# Patient Record
Sex: FEMALE | Race: WHITE | NOT HISPANIC OR LATINO | Employment: OTHER | URBAN - NONMETROPOLITAN AREA
[De-identification: names, ages, dates, MRNs, and addresses within clinical notes are randomized per-mention and may not be internally consistent; named-entity substitution may affect disease eponyms.]

---

## 2017-03-14 DIAGNOSIS — E11.9 TYPE 2 DIABETES MELLITUS WITHOUT COMPLICATION, WITHOUT LONG-TERM CURRENT USE OF INSULIN (HCC): Primary | ICD-10-CM

## 2017-03-15 LAB
ALBUMIN SERPL-MCNC: 4.2 G/DL (ref 3.2–5.5)
ALBUMIN/GLOB SERPL: 1.4 G/DL (ref 1–3)
ALP SERPL-CCNC: 53 U/L (ref 15–121)
ALT SERPL W P-5'-P-CCNC: 20 U/L (ref 10–60)
ANION GAP SERPL CALCULATED.3IONS-SCNC: 11 MMOL/L (ref 5–15)
AST SERPL-CCNC: 23 U/L (ref 10–60)
BASOPHILS # BLD AUTO: 0.02 10*3/MM3 (ref 0–0.2)
BASOPHILS NFR BLD AUTO: 0.3 % (ref 0–2)
BILIRUB SERPL-MCNC: 0.8 MG/DL (ref 0.2–1)
BUN BLD-MCNC: 20 MG/DL (ref 8–25)
BUN/CREAT SERPL: 16.7 (ref 7–25)
CALCIUM SPEC-SCNC: 9.5 MG/DL (ref 8.4–10.8)
CHLORIDE SERPL-SCNC: 104 MMOL/L (ref 100–112)
CHOLEST SERPL-MCNC: 136 MG/DL (ref 150–200)
CO2 SERPL-SCNC: 25 MMOL/L (ref 20–32)
CREAT BLD-MCNC: 1.2 MG/DL (ref 0.4–1.3)
DEPRECATED RDW RBC AUTO: 42.3 FL (ref 36.4–46.3)
EOSINOPHIL # BLD AUTO: 0.05 10*3/MM3 (ref 0–0.7)
EOSINOPHIL NFR BLD AUTO: 0.7 % (ref 0–7)
ERYTHROCYTE [DISTWIDTH] IN BLOOD BY AUTOMATED COUNT: 13.5 % (ref 11.5–14.5)
GFR SERPL CREATININE-BSD FRML MDRD: 45 ML/MIN/1.73 (ref 45–104)
GLOBULIN UR ELPH-MCNC: 3.1 GM/DL (ref 2.5–4.6)
GLUCOSE BLD-MCNC: 138 MG/DL (ref 70–100)
HCT VFR BLD AUTO: 32.6 % (ref 35–45)
HDLC SERPL-MCNC: 60 MG/DL (ref 35–100)
HGB BLD-MCNC: 10.6 G/DL (ref 12–15.5)
LDLC SERPL CALC-MCNC: 61 MG/DL
LDLC/HDLC SERPL: 1.02 {RATIO}
LYMPHOCYTES # BLD AUTO: 1.42 10*3/MM3 (ref 0.6–4.2)
LYMPHOCYTES NFR BLD AUTO: 18.7 % (ref 10–50)
MCH RBC QN AUTO: 29.1 PG (ref 26.5–34)
MCHC RBC AUTO-ENTMCNC: 32.5 G/DL (ref 31.4–36)
MCV RBC AUTO: 89.6 FL (ref 80–98)
MONOCYTES # BLD AUTO: 0.59 10*3/MM3 (ref 0–0.9)
MONOCYTES NFR BLD AUTO: 7.8 % (ref 0–12)
NEUTROPHILS # BLD AUTO: 5.52 10*3/MM3 (ref 2–8.6)
NEUTROPHILS NFR BLD AUTO: 72.5 % (ref 37–80)
PLATELET # BLD AUTO: 185 10*3/MM3 (ref 150–450)
PMV BLD AUTO: 9.8 FL (ref 8–12)
POTASSIUM BLD-SCNC: 4.5 MMOL/L (ref 3.4–5.4)
PROT SERPL-MCNC: 7.3 G/DL (ref 6.7–8.2)
RBC # BLD AUTO: 3.64 10*6/MM3 (ref 3.77–5.16)
SODIUM BLD-SCNC: 140 MMOL/L (ref 134–146)
TRIGL SERPL-MCNC: 74 MG/DL (ref 35–160)
VLDLC SERPL-MCNC: 14.8 MG/DL
WBC NRBC COR # BLD: 7.6 10*3/MM3 (ref 3.2–9.8)

## 2017-03-15 PROCEDURE — 84436 ASSAY OF TOTAL THYROXINE: CPT | Performed by: NURSE PRACTITIONER

## 2017-03-15 PROCEDURE — 80053 COMPREHEN METABOLIC PANEL: CPT | Performed by: NURSE PRACTITIONER

## 2017-03-15 PROCEDURE — 85025 COMPLETE CBC W/AUTO DIFF WBC: CPT | Performed by: NURSE PRACTITIONER

## 2017-03-15 PROCEDURE — 36415 COLL VENOUS BLD VENIPUNCTURE: CPT | Performed by: NURSE PRACTITIONER

## 2017-03-15 PROCEDURE — 80061 LIPID PANEL: CPT | Performed by: NURSE PRACTITIONER

## 2017-03-15 PROCEDURE — 84443 ASSAY THYROID STIM HORMONE: CPT | Performed by: NURSE PRACTITIONER

## 2017-03-15 PROCEDURE — 84481 FREE ASSAY (FT-3): CPT | Performed by: NURSE PRACTITIONER

## 2017-03-15 PROCEDURE — 83036 HEMOGLOBIN GLYCOSYLATED A1C: CPT | Performed by: NURSE PRACTITIONER

## 2017-03-16 ENCOUNTER — OFFICE VISIT (OUTPATIENT)
Dept: FAMILY MEDICINE CLINIC | Facility: CLINIC | Age: 62
End: 2017-03-16

## 2017-03-16 VITALS
SYSTOLIC BLOOD PRESSURE: 118 MMHG | BODY MASS INDEX: 24.46 KG/M2 | HEIGHT: 65 IN | TEMPERATURE: 98.9 F | HEART RATE: 88 BPM | DIASTOLIC BLOOD PRESSURE: 60 MMHG | WEIGHT: 146.8 LBS

## 2017-03-16 DIAGNOSIS — I10 ESSENTIAL HYPERTENSION: Primary | Chronic | ICD-10-CM

## 2017-03-16 DIAGNOSIS — E11.69 TYPE 2 DIABETES MELLITUS WITH OTHER SPECIFIED COMPLICATION (HCC): Chronic | ICD-10-CM

## 2017-03-16 DIAGNOSIS — E78.5 HYPERLIPIDEMIA, UNSPECIFIED HYPERLIPIDEMIA TYPE: Chronic | ICD-10-CM

## 2017-03-16 LAB
HBA1C MFR BLD: 7.68 % (ref 4–5.6)
T4 SERPL-MCNC: 8.64 MCG/DL (ref 5.5–11)
TSH SERPL DL<=0.05 MIU/L-ACNC: 2.28 MIU/ML (ref 0.46–4.68)

## 2017-03-16 PROCEDURE — 99213 OFFICE O/P EST LOW 20 MIN: CPT | Performed by: NURSE PRACTITIONER

## 2017-03-16 RX ORDER — LISINOPRIL 40 MG/1
20 TABLET ORAL DAILY
Qty: 15 TABLET | Refills: 5 | Status: SHIPPED | OUTPATIENT
Start: 2017-03-16 | End: 2017-09-06 | Stop reason: SDUPTHER

## 2017-03-17 LAB — T3FREE SERPL-MCNC: 2.5 PG/ML (ref 2–4.4)

## 2017-03-20 RX ORDER — GABAPENTIN 300 MG/1
CAPSULE ORAL
Qty: 60 CAPSULE | Refills: 5 | Status: SHIPPED | OUTPATIENT
Start: 2017-03-20 | End: 2017-09-06 | Stop reason: SDUPTHER

## 2017-03-22 PROBLEM — I10 ESSENTIAL HYPERTENSION: Status: ACTIVE | Noted: 2017-03-22

## 2017-03-22 PROBLEM — E11.9 TYPE 2 DIABETES MELLITUS (HCC): Status: ACTIVE | Noted: 2017-03-22

## 2017-03-22 PROBLEM — E78.5 HYPERLIPIDEMIA: Status: ACTIVE | Noted: 2017-03-22

## 2017-03-22 NOTE — PROGRESS NOTES
Subjective   Odessa Walker is a 62 y.o. female. Patient here today with complaints of Follow-up (3month)   patient here today for 3 month follow-up regarding hyperlipidemia, hypertension, diabetes mellitus.  She had labs recently and is here for those results, was started on Tanzeum at last visit, 2-3 months ago and states that this is working well for her.  She has lost a few pounds and her blood sugar is much better controlled.  She takes multivitamin daily.  Had colonoscopy and EGD in [id number] and reports that this was normal.  She is podiatry 3/20/17.  Sees an eye doctor yearly as well.  Mammogram and pneumonia vaccines were given to her last year.  She is due for and desires Zostavax.  No complaints of headache, shortness of breath, chest pain.  Has been checking her pulse and blood pressures and they have been running on average 60s to 70s, pulse, and systolic -145 on average 120s.  Diastolic BP 50s to 70s on average 60s.  Her blood sugars have been trending downward, lowest 107 and highest 247.  With the highest blood sugar she reports that she did eat a few snacks 2 hours prior to this reading.    Vitals:    03/16/17 1103   BP: 118/60   Pulse: 88   Temp: 98.9 °F (37.2 °C)     Past Medical History:   Diagnosis Date   • Benign essential hypertension    • Candidiasis of skin and nail     L foot   • Chest pain    • Chronic anemia    • Claudication    • Corns and callosities    • Depressive disorder    • Dermatitis    • Disease of nail     other specified   • Disorder of peripheral nervous system    • Displaced fracture of third metatarsal bone, right foot, initial encounter for closed fracture    • Displaced fracture of third metatarsal bone, right foot, subsequent encounter for fracture with routine healing    • Diverticular disease of colon    • Dog bite of hand    • Epigastric pain    • Essential hypertension    • Foot pain    • GERD (gastroesophageal reflux disease)    • H/O screening mammography     • Hallux valgus    • Heartburn    • Hyperlipidemia    • Joint pain    • Joint swelling    • Neurologic disorder associated with diabetes mellitus     type 2   • Nondisplaced fracture of fourth metatarsal bone, right foot, initial encounter for closed fracture    • Nondisplaced fracture of fourth metatarsal bone, right foot, subsequent encounter for fracture with routine healing    • Pain In Right Leg    • Superficial laceration of hand    • Type 2 diabetes mellitus    • Urinary tract infectious disease    • Venous insufficiency (chronic) (peripheral)      Hypertension   This is a chronic problem. The current episode started more than 1 year ago. The problem has been waxing and waning since onset. The problem is controlled. Pertinent negatives include no anxiety, blurred vision, chest pain, headaches, malaise/fatigue, neck pain, orthopnea, palpitations, peripheral edema, PND, shortness of breath or sweats. Agents associated with hypertension include NSAIDs. Risk factors for coronary artery disease include diabetes mellitus, dyslipidemia, post-menopausal state and sedentary lifestyle. Past treatments include ACE inhibitors. The current treatment provides moderate improvement. Compliance problems include exercise, diet, medication cost and psychosocial issues.  There is no history of angina, kidney disease, CAD/MI, CVA, heart failure, left ventricular hypertrophy, PVD, renovascular disease, retinopathy or a thyroid problem. There is no history of chronic renal disease, coarctation of the aorta, hyperaldosteronism, hypercortisolism, hyperparathyroidism, a hypertension causing med, pheochromocytoma or sleep apnea.   Hyperlipidemia   This is a chronic problem. The current episode started more than 1 year ago. The problem is controlled. Recent lipid tests were reviewed and are low. Exacerbating diseases include diabetes. She has no history of chronic renal disease, hypothyroidism, liver disease, obesity or nephrotic  syndrome. Factors aggravating her hyperlipidemia include fatty foods. Pertinent negatives include no chest pain, focal sensory loss, focal weakness, leg pain, myalgias or shortness of breath. Current antihyperlipidemic treatment includes exercise, diet change and statins. The current treatment provides moderate improvement of lipids. Compliance problems include adherence to exercise, adherence to diet, psychosocial issues and medication cost.  Risk factors for coronary artery disease include post-menopausal, hypertension, dyslipidemia and diabetes mellitus.   Diabetes   She presents for her follow-up diabetic visit. She has type 2 diabetes mellitus. Her disease course has been stable. There are no hypoglycemic associated symptoms. Pertinent negatives for hypoglycemia include no headaches or sweats. There are no diabetic associated symptoms. Pertinent negatives for diabetes include no blurred vision and no chest pain. There are no hypoglycemic complications. Symptoms are stable. There are no diabetic complications. Pertinent negatives for diabetic complications include no CVA, PVD or retinopathy. Risk factors for coronary artery disease include diabetes mellitus, dyslipidemia, hypertension and post-menopausal. Current diabetic treatment includes diet and oral agent (dual therapy) (Tanzeum). She is compliant with treatment all of the time. Her weight is stable. She is following a generally healthy diet. She participates in exercise intermittently. Her home blood glucose trend is decreasing steadily. Her breakfast blood glucose range is generally 110-130 mg/dl. An ACE inhibitor/angiotensin II receptor blocker is being taken. She sees a podiatrist.Eye exam is current.        The following portions of the patient's history were reviewed and updated as appropriate: allergies, current medications, past family history, past medical history, past social history, past surgical history and problem list.    Review of Systems    Constitutional: Negative.  Negative for malaise/fatigue.   HENT: Negative.    Eyes: Negative.  Negative for blurred vision.   Respiratory: Negative.  Negative for shortness of breath.    Cardiovascular: Negative.  Negative for chest pain, palpitations, orthopnea and PND.   Gastrointestinal: Negative.    Endocrine: Negative.    Genitourinary: Negative.    Musculoskeletal: Negative.  Negative for myalgias and neck pain.   Skin: Negative.    Allergic/Immunologic: Negative.    Neurological: Negative.  Negative for focal weakness and headaches.   Hematological: Negative.    Psychiatric/Behavioral: Negative.        Objective   Physical Exam   Constitutional: She is oriented to person, place, and time. She appears well-developed and well-nourished. No distress.   HENT:   Head: Normocephalic and atraumatic.   Neck: Neck supple. Normal carotid pulses present. Carotid bruit is not present.   Cardiovascular: Normal rate, regular rhythm, normal heart sounds and intact distal pulses.  Exam reveals no gallop and no friction rub.    No murmur heard.  Pulmonary/Chest: Effort normal and breath sounds normal. No respiratory distress. She has no wheezes. She has no rales.   Abdominal: Soft. Bowel sounds are normal. She exhibits no distension and no mass. There is no tenderness. There is no guarding. No hernia.   Musculoskeletal: Normal range of motion. She exhibits no edema.   Neurological: She is alert and oriented to person, place, and time.   Skin: Skin is warm and dry. No rash noted. She is not diaphoretic. No erythema. No pallor.   Psychiatric: She has a normal mood and affect. Her behavior is normal. Judgment and thought content normal.   Nursing note and vitals reviewed.      Assessment/Plan   Odessa was seen today for follow-up.    Diagnoses and all orders for this visit:    Essential hypertension    Hyperlipidemia, unspecified hyperlipidemia type    Type 2 diabetes mellitus with other specified complication    Other  orders  -     SITagliptin (JANUVIA) 100 MG tablet; Take 1 tablet by mouth Daily.  -     lisinopril (PRINIVIL,ZESTRIL) 40 MG tablet; Take 0.5 tablets by mouth Daily.  -     zoster vaccine live PF (ZOSTAVAX) 13707 UNT/0.65ML injection; Inject 19,400 Units under the skin 1 (One) Time for 1 dose.        She is given refills of lisinopril and Januvia and no other refills needed today.  Lab results are discussed with her and copies are given to her.  She will see podiatry on March 20 as scheduled and will continue to see then yearly as well as optometry /ophthalmology yearly.  We'll continue to monitor blood pressure and pulse as well as blood sugar.  Will return to the clinic in 3 months or sooner if necessary.  She is given Zostavax prescription for pharmacist to give her she can bring back here for us to give to her.  All questions and concerns are addressed with understanding noted. The patient is in agreement to above plan.

## 2017-03-27 RX ORDER — OMEPRAZOLE 40 MG/1
CAPSULE, DELAYED RELEASE ORAL
Qty: 30 CAPSULE | Refills: 6 | Status: SHIPPED | OUTPATIENT
Start: 2017-03-27 | End: 2017-09-06 | Stop reason: SDUPTHER

## 2017-04-03 RX ORDER — BLOOD SUGAR DIAGNOSTIC
STRIP MISCELLANEOUS
Qty: 50 EACH | Refills: 11 | Status: SHIPPED | OUTPATIENT
Start: 2017-04-03 | End: 2017-12-07 | Stop reason: SDUPTHER

## 2017-04-06 RX ORDER — LISINOPRIL 40 MG/1
TABLET ORAL
Qty: 15 TABLET | Refills: 5 | Status: SHIPPED | OUTPATIENT
Start: 2017-04-06 | End: 2017-06-09

## 2017-04-07 ENCOUNTER — OFFICE VISIT (OUTPATIENT)
Dept: PODIATRY | Facility: CLINIC | Age: 62
End: 2017-04-07

## 2017-04-07 VITALS — BODY MASS INDEX: 24.32 KG/M2 | WEIGHT: 146 LBS | HEIGHT: 65 IN

## 2017-04-07 DIAGNOSIS — B35.1 ONYCHOMYCOSIS: Primary | ICD-10-CM

## 2017-04-07 DIAGNOSIS — M79.672 FOOT PAIN, BILATERAL: ICD-10-CM

## 2017-04-07 DIAGNOSIS — M20.41 HAMMER TOES OF BOTH FEET: ICD-10-CM

## 2017-04-07 DIAGNOSIS — M79.671 FOOT PAIN, BILATERAL: ICD-10-CM

## 2017-04-07 DIAGNOSIS — M20.42 HAMMER TOES OF BOTH FEET: ICD-10-CM

## 2017-04-07 DIAGNOSIS — M20.10 HALLUX VALGUS, UNSPECIFIED LATERALITY: ICD-10-CM

## 2017-04-07 PROCEDURE — 99213 OFFICE O/P EST LOW 20 MIN: CPT | Performed by: PODIATRIST

## 2017-04-07 PROCEDURE — 11721 DEBRIDE NAIL 6 OR MORE: CPT | Performed by: PODIATRIST

## 2017-04-07 NOTE — PROGRESS NOTES
Odessa Walker  1955  62 y.o. female   PCP: LEXY MESSINA 03/16/2017  BS-200 per pt  A1c 7.68 (03/15/2017)    Patient presents today for routine diabetic nail care.    4/7/17    Chief Complaint   Patient presents with   • Left Foot - diabetic nail care, Bunions, Hammer Toe   • Right Foot - Bunions, Hammer Toe, daibetic nail care           History of Present Illness    Patient presents today for routine diabetic foot care.  States her nails the become long and painful especially walking.  She describes the pain as achy and constant when her nails are long.  Rates the pain as a 3 out of 10.  She also wishes to further discuss surgery to treat her foot deformities.  Admits to numbness, burning and tingling in both her feet.  She has no other pedal complaints         Past Medical History:   Diagnosis Date   • Benign essential hypertension    • Candidiasis of skin and nail     L foot   • Chest pain    • Chronic anemia    • Claudication    • Corns and callosities    • Depressive disorder    • Dermatitis    • Disease of nail     other specified   • Disorder of peripheral nervous system    • Displaced fracture of third metatarsal bone, right foot, initial encounter for closed fracture    • Displaced fracture of third metatarsal bone, right foot, subsequent encounter for fracture with routine healing    • Diverticular disease of colon    • Dog bite of hand    • Epigastric pain    • Essential hypertension    • Foot pain    • GERD (gastroesophageal reflux disease)    • H/O screening mammography    • Hallux valgus    • Heartburn    • Hyperlipidemia    • Joint pain    • Joint swelling    • Neurologic disorder associated with diabetes mellitus     type 2   • Nondisplaced fracture of fourth metatarsal bone, right foot, initial encounter for closed fracture    • Nondisplaced fracture of fourth metatarsal bone, right foot, subsequent encounter for fracture with routine healing    • Pain In Right Leg    • Superficial  laceration of hand    • Type 2 diabetes mellitus    • Urinary tract infectious disease    • Venous insufficiency (chronic) (peripheral)          Past Surgical History:   Procedure Laterality Date   • BACK SURGERY  2007   • COLONOSCOPY  11/11/2015    Diverticulosis found in the sigmoid colon. Hemorrhoids found in the anus.   • KNEE SURGERY  2005   • OTHER SURGICAL HISTORY  05/06/2015    DEBRIDE NAIL 6 OR MORE    • OTHER SURGICAL HISTORY  05/06/2015    PARING CORN/CALLUS    • OTHER SURGICAL HISTORY  10/25/2015    TREAT METATARSAL FRACTURE    • UPPER GASTROINTESTINAL ENDOSCOPY  11/11/2015    Esophagitis seen.Biopsy taken.A hiatus hernia was found in the esophagus.Gastritis found in the stomach.Biopsy taken   • US VENOUS EXTREMITY UNILATERAL  01/26/2016   • VAGINAL HYSTERECTOMY SALPINGO OOPHORECTOMY  1999         Family History   Problem Relation Age of Onset   • Heart disease Mother    • Osteoarthritis Father    • Diabetes Other          Social History     Social History   • Marital status:      Spouse name: N/A   • Number of children: N/A   • Years of education: N/A     Occupational History   • Not on file.     Social History Main Topics   • Smoking status: Former Smoker   • Smokeless tobacco: Never Used   • Alcohol use No   • Drug use: No   • Sexual activity: Not on file     Other Topics Concern   • Not on file     Social History Narrative         Current Outpatient Prescriptions   Medication Sig Dispense Refill   • gabapentin (NEURONTIN) 300 MG capsule TAKE 1 CAPSULE BY MOUTH DAILY AT BEDTIME 60 capsule 5   • glipiZIDE (GLUCOTROL) 10 MG tablet Take 1 tablet by mouth 2 (Two) Times a Day Before Meals. 60 tablet 5   • lisinopril (PRINIVIL,ZESTRIL) 40 MG tablet Take 0.5 tablets by mouth Daily. 15 tablet 5   • lisinopril (PRINIVIL,ZESTRIL) 40 MG tablet TAKE 1/2 TABLET BY MOUTH DAILY 15 tablet 5   • metFORMIN (GLUCOPHAGE) 1000 MG tablet Take 1 tablet by mouth 2 (Two) Times a Day With Meals. 60 tablet 5   •  "omeprazole (priLOSEC) 40 MG capsule TAKE 1 CAPSULE BY MOUTH EVERY DAY 30 capsule 6   • ONETOUCH VERIO test strip USE TO TEST BLOOD SUGAR EVERY DAY 50 each 11   • sertraline (ZOLOFT) 50 MG tablet TAKE 1 TABLET BY MOUTH EVERY DAY 60 tablet 5   • simvastatin (ZOCOR) 40 MG tablet TAKE 1 TABLET BY MOUTH DAILY AT BEDTIME 30 tablet 5   • SITagliptin (JANUVIA) 100 MG tablet Take 1 tablet by mouth Daily. 30 tablet 5   • sulindac (CLINORIL) 200 MG tablet TAKE 1 TABLET BY MOUTH TWICE DAILY 60 tablet 5   • TANZEUM 30 MG pen-injector INJECT 30MG SUB-Q ONCE A WEEK 4 each 2     No current facility-administered medications for this visit.          OBJECTIVE    Ht 65\" (165.1 cm)  Wt 146 lb (66.2 kg)  BMI 24.3 kg/m2      Review of Systems   Constitutional: Negative for chills and fever.   Cardiovascular: Negative for chest pain.   Gastrointestinal: Negative for constipation, diarrhea, nausea and vomiting.   Skin: Negative for wound. long painful toenails  Musculoskeletal: bilateral foot pain      Constitutional: well developed, well nourished    HEENT: Normocephalic and atraumatic, normal hearing    Respiratory: Non labored respirations noted    Cardiovascular:    DP/PT pulses faintly palpable    CFT brisk  to all digits  Skin temp is warm to warm from proximal tibia to distal digits  Pedal hair growth absent.   No erythema noted  Edema noted to b/l LEs    Musculoskeletal:  Muscle strength is 5/5 for all muscle groups tested   Moderate to severe hallux valgus noted right, mild to moderate hallux valgus left  Rigidly contracted second digit on the right, reducibly contracted digits 3,4 and 5 right  Reducibly contracted digits 2 through 5 left  Diminished fat pad bilateral  Pain with first MPJ range of motion bilateral    Dermatological:   Nails 1-5 are thickened, elongated and discolored with subungal debris bilateral    Skin is warm, dry and intact    Webspaces 1-4 bilateral are clean, dry and intact.   No subcutaneous nodules or " masses noted    No open wounds noted     Neurological:   Protective sensation diminished  Sensation intact to light touch    DTR intact    Psychiatric: A&O x 3 with normal mood and affect. NAD.         Procedures        ASSESSMENT AND PLAN    Odessa was seen today for diabetic nail care, bunions, hammer toe, bunions, hammer toe and daibetic nail care.    Diagnoses and all orders for this visit:    Onychomycosis    Hammer toes of both feet    Hallux valgus, unspecified laterality    Foot pain, bilateral    - Nails 1 through 5 bilateral were debrided in length and thickness without incident utilizing nail nippers.  - Rediscussed surgical options to treat her foot deformities.  At this time patient elected to hold off on surgery.  - All questions were answered and the patient is in agreement with the current treatment plan.  - RTC prn         This document has been electronically signed by Ge Elizondo DPM on April 9, 2017 11:58 AM     4/9/2017  11:58 AM

## 2017-05-16 ENCOUNTER — TELEPHONE (OUTPATIENT)
Dept: FAMILY MEDICINE CLINIC | Facility: CLINIC | Age: 62
End: 2017-05-16

## 2017-05-16 DIAGNOSIS — Z12.31 SCREENING MAMMOGRAM, ENCOUNTER FOR: Primary | ICD-10-CM

## 2017-05-16 DIAGNOSIS — Z13.820 SCREENING FOR OSTEOPOROSIS: ICD-10-CM

## 2017-05-31 ENCOUNTER — TELEPHONE (OUTPATIENT)
Dept: FAMILY MEDICINE CLINIC | Facility: CLINIC | Age: 62
End: 2017-05-31

## 2017-06-09 ENCOUNTER — OFFICE VISIT (OUTPATIENT)
Dept: FAMILY MEDICINE CLINIC | Facility: CLINIC | Age: 62
End: 2017-06-09

## 2017-06-09 VITALS
DIASTOLIC BLOOD PRESSURE: 64 MMHG | TEMPERATURE: 99.2 F | HEIGHT: 65 IN | WEIGHT: 144.4 LBS | HEART RATE: 88 BPM | SYSTOLIC BLOOD PRESSURE: 122 MMHG | BODY MASS INDEX: 24.06 KG/M2

## 2017-06-09 DIAGNOSIS — E11.69 TYPE 2 DIABETES MELLITUS WITH OTHER SPECIFIED COMPLICATION (HCC): Chronic | ICD-10-CM

## 2017-06-09 DIAGNOSIS — I10 ESSENTIAL HYPERTENSION: Primary | Chronic | ICD-10-CM

## 2017-06-09 DIAGNOSIS — E78.5 HYPERLIPIDEMIA, UNSPECIFIED HYPERLIPIDEMIA TYPE: Chronic | ICD-10-CM

## 2017-06-09 PROCEDURE — 99214 OFFICE O/P EST MOD 30 MIN: CPT | Performed by: NURSE PRACTITIONER

## 2017-06-09 RX ORDER — GLIPIZIDE 10 MG/1
10 TABLET ORAL
Qty: 60 TABLET | Refills: 5 | Status: SHIPPED | OUTPATIENT
Start: 2017-06-09 | End: 2017-11-13 | Stop reason: SDUPTHER

## 2017-06-09 RX ORDER — SIMVASTATIN 40 MG
40 TABLET ORAL NIGHTLY
Qty: 30 TABLET | Refills: 5 | Status: SHIPPED | OUTPATIENT
Start: 2017-06-09 | End: 2017-09-06 | Stop reason: SDUPTHER

## 2017-06-09 NOTE — PROGRESS NOTES
Subjective   Odessa Walker is a 62 y.o. female. Patient here today with complaints of Med Refill  pt here today for recheck of diabetes, hyperlipidemia, htn, controlled on lisinopril, metformin, glipizide, prilosec, zocor, tanzeum. Denies side effects of meds. mammo and BMD scheduled for 6-22-17, labs are UTD. Relates FSBS improved , running 130-140. Needing refills on metformin, zocor and glipizide today.     Vitals:    06/09/17 1303   BP: 122/64   Pulse: 88   Temp: 99.2 °F (37.3 °C)     Past Medical History:   Diagnosis Date   • Benign essential hypertension    • Candidiasis of skin and nail     L foot   • Chest pain    • Chronic anemia    • Claudication    • Corns and callosities    • Depressive disorder    • Dermatitis    • Disease of nail     other specified   • Disorder of peripheral nervous system    • Displaced fracture of third metatarsal bone, right foot, initial encounter for closed fracture    • Displaced fracture of third metatarsal bone, right foot, subsequent encounter for fracture with routine healing    • Diverticular disease of colon    • Dog bite of hand    • Epigastric pain    • Essential hypertension    • Foot pain    • GERD (gastroesophageal reflux disease)    • H/O screening mammography    • Hallux valgus    • Heartburn    • Hyperlipidemia    • Joint pain    • Joint swelling    • Neurologic disorder associated with diabetes mellitus     type 2   • Nondisplaced fracture of fourth metatarsal bone, right foot, initial encounter for closed fracture    • Nondisplaced fracture of fourth metatarsal bone, right foot, subsequent encounter for fracture with routine healing    • Pain In Right Leg    • Superficial laceration of hand    • Type 2 diabetes mellitus    • Urinary tract infectious disease    • Venous insufficiency (chronic) (peripheral)      Diabetes   She presents for her follow-up diabetic visit. She has type 2 diabetes mellitus. Her disease course has been improving. There are no  hypoglycemic associated symptoms. Pertinent negatives for hypoglycemia include no headaches or sweats. There are no diabetic associated symptoms. Pertinent negatives for diabetes include no blurred vision and no chest pain. There are no hypoglycemic complications. Symptoms are stable. There are no diabetic complications. Risk factors for coronary artery disease include dyslipidemia, diabetes mellitus, hypertension and post-menopausal. Current diabetic treatment includes oral agent (triple therapy) (tanzeum). She is compliant with treatment all of the time. Her weight is stable. She is following a generally healthy diet. Meal planning includes calorie counting. She participates in exercise intermittently. Her home blood glucose trend is decreasing steadily. Her breakfast blood glucose range is generally 130-140 mg/dl. An ACE inhibitor/angiotensin II receptor blocker is being taken. She sees a podiatrist.Eye exam is current.   Hypertension   This is a chronic problem. The current episode started more than 1 year ago. The problem is unchanged. The problem is controlled. Pertinent negatives include no anxiety, blurred vision, chest pain, headaches, malaise/fatigue, neck pain, orthopnea, palpitations, peripheral edema, PND, shortness of breath or sweats. Agents associated with hypertension include NSAIDs. Risk factors for coronary artery disease include dyslipidemia, diabetes mellitus and post-menopausal state. Past treatments include ACE inhibitors. The current treatment provides moderate improvement. Compliance problems include exercise, diet and psychosocial issues.  There is no history of chronic renal disease.   Hyperlipidemia   This is a chronic problem. The current episode started more than 1 year ago. The problem is controlled. Recent lipid tests were reviewed and are low. Exacerbating diseases include diabetes. She has no history of chronic renal disease, hypothyroidism, liver disease, obesity or nephrotic  syndrome. Factors aggravating her hyperlipidemia include fatty foods. Pertinent negatives include no chest pain, focal sensory loss, focal weakness, leg pain, myalgias or shortness of breath. Current antihyperlipidemic treatment includes exercise, diet change and statins. The current treatment provides moderate improvement of lipids. Compliance problems include adherence to exercise, adherence to diet and psychosocial issues.  Risk factors for coronary artery disease include dyslipidemia, diabetes mellitus, hypertension and post-menopausal.        The following portions of the patient's history were reviewed and updated as appropriate: allergies, current medications, past family history, past medical history, past social history, past surgical history and problem list.    Review of Systems   Constitutional: Negative.  Negative for malaise/fatigue.   HENT: Negative.    Eyes: Negative.  Negative for blurred vision.   Respiratory: Negative.  Negative for shortness of breath.    Cardiovascular: Negative.  Negative for chest pain, palpitations, orthopnea and PND.   Gastrointestinal: Negative.    Endocrine: Negative.    Genitourinary: Negative.    Musculoskeletal: Negative.  Negative for myalgias and neck pain.   Skin: Negative.    Allergic/Immunologic: Negative.    Neurological: Negative.  Negative for focal weakness and headaches.   Hematological: Negative.    Psychiatric/Behavioral: Negative.        Objective   Physical Exam   Constitutional: She is oriented to person, place, and time. She appears well-developed and well-nourished. No distress.   HENT:   Head: Normocephalic and atraumatic.   Neck: Neck supple. Normal carotid pulses present. Carotid bruit is not present.   Cardiovascular: Normal rate, regular rhythm, normal heart sounds and intact distal pulses.  Exam reveals no gallop and no friction rub.    No murmur heard.  Pulmonary/Chest: Effort normal and breath sounds normal. No respiratory distress. She has no  wheezes. She has no rales.   Musculoskeletal: Normal range of motion. She exhibits no edema.   Neurological: She is alert and oriented to person, place, and time.   Skin: Skin is warm and dry. No rash noted. She is not diaphoretic. No erythema. No pallor.   Psychiatric: She has a normal mood and affect. Her behavior is normal. Judgment and thought content normal.   Nursing note and vitals reviewed.      Assessment/Plan   Odessa was seen today for med refill.    Diagnoses and all orders for this visit:    Essential hypertension    Hyperlipidemia, unspecified hyperlipidemia type    Type 2 diabetes mellitus with other specified complication    Other orders  -     simvastatin (ZOCOR) 40 MG tablet; Take 1 tablet by mouth Every Night.  -     metFORMIN (GLUCOPHAGE) 1000 MG tablet; Take 1 tablet by mouth 2 (Two) Times a Day With Meals.  -     glipiZIDE (GLUCOTROL) 10 MG tablet; Take 1 tablet by mouth 2 (Two) Times a Day Before Meals.      She is given refills on metformin, glipizide and zocor as above, will have mammo and bmd as scheduled. Labs are UTD, will RTC in 3 months and will have labs repeated at that time as well. Cont to monitor her FSBS closely. All questions and concerns are addressed with understanding noted. The patient is in agreement to above plan.

## 2017-07-10 ENCOUNTER — OFFICE VISIT (OUTPATIENT)
Dept: PODIATRY | Facility: CLINIC | Age: 62
End: 2017-07-10

## 2017-07-10 VITALS — HEIGHT: 65 IN | BODY MASS INDEX: 24.32 KG/M2 | WEIGHT: 146 LBS

## 2017-07-10 DIAGNOSIS — M79.671 FOOT PAIN, BILATERAL: ICD-10-CM

## 2017-07-10 DIAGNOSIS — E11.42 TYPE 2 DIABETES MELLITUS WITH PERIPHERAL NEUROPATHY (HCC): ICD-10-CM

## 2017-07-10 DIAGNOSIS — B35.1 ONYCHOMYCOSIS: Primary | ICD-10-CM

## 2017-07-10 DIAGNOSIS — M79.672 FOOT PAIN, BILATERAL: ICD-10-CM

## 2017-07-10 PROCEDURE — 11721 DEBRIDE NAIL 6 OR MORE: CPT | Performed by: PODIATRIST

## 2017-07-10 NOTE — PROGRESS NOTES
Odessa Walker  1955  62 y.o. female   PCP: LEXY MESSINA 06/09/2017  BS-129 per pt    Patient states she had a bone scan and was told to follow-up with her podiatrist.    07/10/17    Chief Complaint   Patient presents with   • Left Foot - Follow-up   • Right Foot - Follow-up           History of Present Illness    Patient presents today for routine diabetic foot care.  States her nails the become long and painful especially walking.  She describes the pain as achy and constant when her nails are long.  Admits to numbness, burning and tingling in both her feet.  She has no other pedal complaints         Past Medical History:   Diagnosis Date   • Benign essential hypertension    • Candidiasis of skin and nail     L foot   • Chest pain    • Chronic anemia    • Claudication    • Corns and callosities    • Depressive disorder    • Dermatitis    • Disease of nail     other specified   • Disorder of peripheral nervous system    • Displaced fracture of third metatarsal bone, right foot, initial encounter for closed fracture    • Displaced fracture of third metatarsal bone, right foot, subsequent encounter for fracture with routine healing    • Diverticular disease of colon    • Dog bite of hand    • Epigastric pain    • Essential hypertension    • Foot pain    • GERD (gastroesophageal reflux disease)    • H/O screening mammography    • Hallux valgus    • Heartburn    • Hyperlipidemia    • Joint pain    • Joint swelling    • Neurologic disorder associated with diabetes mellitus     type 2   • Nondisplaced fracture of fourth metatarsal bone, right foot, initial encounter for closed fracture    • Nondisplaced fracture of fourth metatarsal bone, right foot, subsequent encounter for fracture with routine healing    • Pain In Right Leg    • Superficial laceration of hand    • Type 2 diabetes mellitus    • Urinary tract infectious disease    • Venous insufficiency (chronic) (peripheral)          Past Surgical History:    Procedure Laterality Date   • BACK SURGERY  2007   • COLONOSCOPY  11/11/2015    Diverticulosis found in the sigmoid colon. Hemorrhoids found in the anus.   • KNEE SURGERY  2005   • OTHER SURGICAL HISTORY  05/06/2015    DEBRIDE NAIL 6 OR MORE    • OTHER SURGICAL HISTORY  05/06/2015    PARING CORN/CALLUS    • OTHER SURGICAL HISTORY  10/25/2015    TREAT METATARSAL FRACTURE    • UPPER GASTROINTESTINAL ENDOSCOPY  11/11/2015    Esophagitis seen.Biopsy taken.A hiatus hernia was found in the esophagus.Gastritis found in the stomach.Biopsy taken   • US VENOUS EXTREMITY UNILATERAL  01/26/2016   • VAGINAL HYSTERECTOMY SALPINGO OOPHORECTOMY  1999         Family History   Problem Relation Age of Onset   • Heart disease Mother    • Osteoarthritis Father    • Diabetes Other          Social History     Social History   • Marital status:      Spouse name: N/A   • Number of children: N/A   • Years of education: N/A     Occupational History   • Not on file.     Social History Main Topics   • Smoking status: Former Smoker   • Smokeless tobacco: Never Used   • Alcohol use No   • Drug use: No   • Sexual activity: Not on file     Other Topics Concern   • Not on file     Social History Narrative         Current Outpatient Prescriptions   Medication Sig Dispense Refill   • gabapentin (NEURONTIN) 300 MG capsule TAKE 1 CAPSULE BY MOUTH DAILY AT BEDTIME 60 capsule 5   • glipiZIDE (GLUCOTROL) 10 MG tablet Take 1 tablet by mouth 2 (Two) Times a Day Before Meals. 60 tablet 5   • lisinopril (PRINIVIL,ZESTRIL) 40 MG tablet Take 0.5 tablets by mouth Daily. 15 tablet 5   • metFORMIN (GLUCOPHAGE) 1000 MG tablet Take 1 tablet by mouth 2 (Two) Times a Day With Meals. 60 tablet 5   • omeprazole (priLOSEC) 40 MG capsule TAKE 1 CAPSULE BY MOUTH EVERY DAY 30 capsule 6   • ONETOUCH VERIO test strip USE TO TEST BLOOD SUGAR EVERY DAY 50 each 11   • sertraline (ZOLOFT) 50 MG tablet TAKE 1 TABLET BY MOUTH EVERY DAY 60 tablet 5   • simvastatin (ZOCOR) 40  "MG tablet Take 1 tablet by mouth Every Night. 30 tablet 5   • SITagliptin (JANUVIA) 100 MG tablet Take 1 tablet by mouth Daily. 30 tablet 5   • sulindac (CLINORIL) 200 MG tablet TAKE 1 TABLET BY MOUTH TWICE DAILY 60 tablet 5   • TANZEUM 30 MG pen-injector INJECT 30MG SUB-Q ONCE A WEEK 4 each 2     No current facility-administered medications for this visit.          OBJECTIVE    Ht 65\" (165.1 cm)  Wt 146 lb (66.2 kg)  BMI 24.3 kg/m2      Review of Systems   Constitutional: Negative for chills and fever.   Cardiovascular: Negative for chest pain.   Gastrointestinal: Negative for constipation, diarrhea, nausea and vomiting.   Skin: Negative for wound. long painful toenails  Musculoskeletal: bilateral foot pain      Constitutional: well developed, well nourished    HEENT: Normocephalic and atraumatic, normal hearing    Respiratory: Non labored respirations noted    Cardiovascular:    DP/PT pulses faintly palpable    CFT brisk  to all digits  Skin temp is warm to warm from proximal tibia to distal digits  Pedal hair growth absent.   No erythema noted  Edema noted to b/l LEs    Musculoskeletal:  Muscle strength is 5/5 for all muscle groups tested   Moderate to severe hallux valgus noted right, mild to moderate hallux valgus left  Rigidly contracted second digit on the right, reducibly contracted digits 3,4 and 5 right  Reducibly contracted digits 2 through 5 left  Diminished fat pad bilateral  Pain with first MPJ range of motion bilateral  POP to toenails    Dermatological:   Nails 1-5 are thickened, elongated and discolored with subungal debris bilateral    Skin is warm, dry and intact    Webspaces 1-4 bilateral are clean, dry and intact.   No subcutaneous nodules or masses noted    No open wounds noted     Neurological:   Protective sensation diminished  Sensation intact to light touch    DTR intact    Psychiatric: A&O x 3 with normal mood and affect. NAD.         Procedures        ASSESSMENT AND PLAN    Odessa was " seen today for follow-up and follow-up.    Diagnoses and all orders for this visit:    Onychomycosis    Foot pain, bilateral    Type 2 diabetes mellitus with peripheral neuropathy    - Nails 1 through 5 bilateral were debrided in length and thickness without incident utilizing nail nippers.  - All questions were answered and the patient is in agreement with the current treatment plan.  - RTC  3 months         This document has been electronically signed by Ge Elizondo DPM on July 10, 2017 4:30 PM     7/10/2017  4:30 PM     EMR Dragon/Transcription disclaimer:   Much of this encounter note is an electronic transcription/translation of spoken language to printed text. The electronic translation of spoken language may permit erroneous, or at times, nonsensical words or phrases to be inadvertently transcribed; Although I have reviewed the note for such errors, some may still exist.

## 2017-07-11 ENCOUNTER — TELEPHONE (OUTPATIENT)
Dept: FAMILY MEDICINE CLINIC | Facility: CLINIC | Age: 62
End: 2017-07-11

## 2017-08-14 RX ORDER — SIMVASTATIN 40 MG
TABLET ORAL
Qty: 30 TABLET | Refills: 5 | Status: SHIPPED | OUTPATIENT
Start: 2017-08-14 | End: 2017-12-07 | Stop reason: SDUPTHER

## 2017-09-06 ENCOUNTER — OFFICE VISIT (OUTPATIENT)
Dept: FAMILY MEDICINE CLINIC | Facility: CLINIC | Age: 62
End: 2017-09-06

## 2017-09-06 VITALS
HEART RATE: 89 BPM | HEIGHT: 65 IN | BODY MASS INDEX: 23.49 KG/M2 | DIASTOLIC BLOOD PRESSURE: 66 MMHG | SYSTOLIC BLOOD PRESSURE: 102 MMHG | WEIGHT: 141 LBS | OXYGEN SATURATION: 98 %

## 2017-09-06 DIAGNOSIS — F32.A DEPRESSION, UNSPECIFIED DEPRESSION TYPE: Chronic | ICD-10-CM

## 2017-09-06 DIAGNOSIS — G64 DISORDER OF PERIPHERAL NERVOUS SYSTEM: Chronic | ICD-10-CM

## 2017-09-06 DIAGNOSIS — I10 ESSENTIAL HYPERTENSION: Primary | Chronic | ICD-10-CM

## 2017-09-06 DIAGNOSIS — E78.5 HYPERLIPIDEMIA, UNSPECIFIED HYPERLIPIDEMIA TYPE: Chronic | ICD-10-CM

## 2017-09-06 DIAGNOSIS — E11.69 TYPE 2 DIABETES MELLITUS WITH OTHER SPECIFIED COMPLICATION (HCC): Chronic | ICD-10-CM

## 2017-09-06 DIAGNOSIS — Z11.59 NEED FOR HEPATITIS C SCREENING TEST: ICD-10-CM

## 2017-09-06 DIAGNOSIS — F41.9 ANXIETY: Chronic | ICD-10-CM

## 2017-09-06 DIAGNOSIS — K21.9 GASTROESOPHAGEAL REFLUX DISEASE WITHOUT ESOPHAGITIS: Chronic | ICD-10-CM

## 2017-09-06 PROCEDURE — 99214 OFFICE O/P EST MOD 30 MIN: CPT | Performed by: NURSE PRACTITIONER

## 2017-09-06 RX ORDER — OMEPRAZOLE 40 MG/1
40 CAPSULE, DELAYED RELEASE ORAL DAILY
Qty: 30 CAPSULE | Refills: 5 | Status: SHIPPED | OUTPATIENT
Start: 2017-09-06 | End: 2018-05-22 | Stop reason: SDUPTHER

## 2017-09-06 RX ORDER — GABAPENTIN 300 MG/1
300 CAPSULE ORAL
Qty: 30 CAPSULE | Refills: 0 | Status: SHIPPED | OUTPATIENT
Start: 2017-09-06 | End: 2017-10-17 | Stop reason: SDUPTHER

## 2017-09-06 RX ORDER — LISINOPRIL 40 MG/1
20 TABLET ORAL DAILY
Qty: 15 TABLET | Refills: 5 | Status: SHIPPED | OUTPATIENT
Start: 2017-09-06 | End: 2017-11-27 | Stop reason: SDUPTHER

## 2017-09-06 NOTE — PROGRESS NOTES
Subjective   Odessa Walker is a 62 y.o. female. Patient here today with complaints of Hyperlipidemia (3 month f/u); Hypertension (3 month f/u); and Diabetes (3 month f.u)  Patient here today for 3 month follow-up regarding diabetes mellitus, hypertension, hyperlipidemia.  She is due for labs.  She is needing refills on some of her medications but not all of them.  She denies side effects of medicines stating that her symptoms are controlled on current medications.  She reports that blood sugars running 120-140 fasting.  She is down 5 more pounds from last visit here and says that she is trying to monitor her diet and exercise more and she is taking Tanzeum which she states has persisted and weight loss as well.  She denies complaints of chest pain or shortness of breath or headache.  Reports adequate sleep and appetite.      Vitals:    09/06/17 0914   BP: 102/66   Pulse: 89   SpO2: 98%     Past Medical History:   Diagnosis Date   • Benign essential hypertension    • Candidiasis of skin and nail     L foot   • Chest pain    • Chronic anemia    • Claudication    • Corns and callosities    • Depressive disorder    • Dermatitis    • Disease of nail     other specified   • Disorder of peripheral nervous system    • Displaced fracture of third metatarsal bone, right foot, initial encounter for closed fracture    • Displaced fracture of third metatarsal bone, right foot, subsequent encounter for fracture with routine healing    • Diverticular disease of colon    • Dog bite of hand    • Epigastric pain    • Essential hypertension    • Foot pain    • GERD (gastroesophageal reflux disease)    • H/O screening mammography    • Hallux valgus    • Heartburn    • Hyperlipidemia    • Joint pain    • Joint swelling    • Neurologic disorder associated with diabetes mellitus     type 2   • Nondisplaced fracture of fourth metatarsal bone, right foot, initial encounter for closed fracture    • Nondisplaced fracture of fourth  metatarsal bone, right foot, subsequent encounter for fracture with routine healing    • Pain In Right Leg    • Superficial laceration of hand    • Type 2 diabetes mellitus    • Urinary tract infectious disease    • Venous insufficiency (chronic) (peripheral)      Hyperlipidemia   This is a chronic problem. The current episode started more than 1 year ago. The problem is controlled. Recent lipid tests were reviewed and are low. Exacerbating diseases include diabetes. She has no history of chronic renal disease, hypothyroidism, liver disease, obesity or nephrotic syndrome. Factors aggravating her hyperlipidemia include fatty foods. Pertinent negatives include no chest pain, focal sensory loss, focal weakness, leg pain, myalgias or shortness of breath. Current antihyperlipidemic treatment includes statins, exercise and diet change. The current treatment provides significant improvement of lipids. There are no compliance problems.  Risk factors for coronary artery disease include post-menopausal, hypertension, dyslipidemia and diabetes mellitus.   Hypertension   This is a chronic problem. The current episode started more than 1 year ago. The problem is unchanged. The problem is controlled. Pertinent negatives include no blurred vision, chest pain, headaches, malaise/fatigue, neck pain, orthopnea, palpitations, peripheral edema, PND, shortness of breath or sweats. Agents associated with hypertension include NSAIDs. Risk factors for coronary artery disease include post-menopausal state, dyslipidemia and diabetes mellitus. Past treatments include lifestyle changes and ACE inhibitors. The current treatment provides significant improvement. There are no compliance problems.  There is no history of chronic renal disease.   Diabetes   She presents for her follow-up diabetic visit. She has type 2 diabetes mellitus. Her disease course has been stable. There are no hypoglycemic associated symptoms. Pertinent negatives for  hypoglycemia include no headaches or sweats. There are no diabetic associated symptoms. Pertinent negatives for diabetes include no blurred vision, no chest pain, no visual change and no weakness. There are no hypoglycemic complications. Symptoms are stable. There are no diabetic complications. Risk factors for coronary artery disease include diabetes mellitus, dyslipidemia, hypertension and post-menopausal. Current diabetic treatment includes oral agent (dual therapy) (plus tanzeum). She is compliant with treatment all of the time. Her weight is decreasing steadily. She is following a generally healthy diet. Meal planning includes carbohydrate counting and calorie counting. She participates in exercise intermittently. Her home blood glucose trend is decreasing steadily. Her breakfast blood glucose range is generally 110-130 mg/dl. An ACE inhibitor/angiotensin II receptor blocker is being taken. She sees a podiatrist.Eye exam is current.   Lower Extremity Issue   This is a chronic problem. The current episode started more than 1 year ago. The problem occurs constantly. The problem has been waxing and waning. Associated symptoms include numbness. Pertinent negatives include no chest pain, chills, congestion, coughing, headaches, myalgias, nausea, neck pain, swollen glands, urinary symptoms, vertigo, visual change, vomiting or weakness. Nothing aggravates the symptoms. Treatments tried: gabapentin. The treatment provided moderate relief.        The following portions of the patient's history were reviewed and updated as appropriate: allergies, current medications, past family history, past medical history, past social history, past surgical history and problem list.    Review of Systems   Constitutional: Negative.  Negative for chills and malaise/fatigue.   HENT: Negative.  Negative for congestion.    Eyes: Negative.  Negative for blurred vision.   Respiratory: Negative.  Negative for cough and shortness of breath.     Cardiovascular: Negative.  Negative for chest pain, palpitations, orthopnea and PND.   Gastrointestinal: Negative.  Negative for nausea and vomiting.   Endocrine: Negative.    Genitourinary: Negative.    Musculoskeletal: Negative.  Negative for myalgias and neck pain.   Skin: Negative.    Allergic/Immunologic: Negative.    Neurological: Positive for numbness. Negative for vertigo, focal weakness, weakness and headaches.   Hematological: Negative.    Psychiatric/Behavioral: Negative.        Objective   Physical Exam   Constitutional: She is oriented to person, place, and time. She appears well-developed and well-nourished. No distress.   HENT:   Head: Normocephalic and atraumatic.   Neck: Normal carotid pulses present. Carotid bruit is not present.   Cardiovascular: Normal rate, regular rhythm, normal heart sounds and intact distal pulses.  Exam reveals no gallop and no friction rub.    No murmur heard.  Pulmonary/Chest: Effort normal and breath sounds normal. No respiratory distress. She has no wheezes. She has no rales.   Abdominal: Soft. Bowel sounds are normal. She exhibits no distension and no mass. There is no tenderness. There is no rebound and no guarding. No hernia.   Musculoskeletal: Normal range of motion. She exhibits no edema.   Neurological: She is alert and oriented to person, place, and time.   Skin: Skin is warm and dry. No rash noted. She is not diaphoretic. No erythema. No pallor.   Psychiatric: She has a normal mood and affect. Her behavior is normal. Judgment and thought content normal.   Nursing note and vitals reviewed.      Assessment/Plan   Odessa was seen today for hyperlipidemia, hypertension and diabetes.    Diagnoses and all orders for this visit:    Essential hypertension  -     Hemoglobin A1c; Future  -     Lipid Panel; Future  -     Comprehensive Metabolic Panel; Future  -     CBC & Differential; Future  -     Pain Management Profile (13 Drugs) Urine; Future    Hyperlipidemia,  unspecified hyperlipidemia type  -     Hemoglobin A1c; Future  -     Lipid Panel; Future  -     Comprehensive Metabolic Panel; Future  -     CBC & Differential; Future  -     Pain Management Profile (13 Drugs) Urine; Future    Type 2 diabetes mellitus with other specified complication  -     Hemoglobin A1c; Future  -     Lipid Panel; Future  -     Comprehensive Metabolic Panel; Future  -     CBC & Differential; Future  -     Pain Management Profile (13 Drugs) Urine; Future    Disorder of peripheral nervous system  -     Hemoglobin A1c; Future  -     Lipid Panel; Future  -     Comprehensive Metabolic Panel; Future  -     CBC & Differential; Future  -     Pain Management Profile (13 Drugs) Urine; Future    Gastroesophageal reflux disease without esophagitis  -     Hemoglobin A1c; Future  -     Lipid Panel; Future  -     Comprehensive Metabolic Panel; Future  -     CBC & Differential; Future  -     Pain Management Profile (13 Drugs) Urine; Future    Anxiety  -     Hemoglobin A1c; Future  -     Lipid Panel; Future  -     Comprehensive Metabolic Panel; Future  -     CBC & Differential; Future  -     Pain Management Profile (13 Drugs) Urine; Future    Depression, unspecified depression type  -     Hemoglobin A1c; Future  -     Lipid Panel; Future  -     Comprehensive Metabolic Panel; Future  -     CBC & Differential; Future  -     Pain Management Profile (13 Drugs) Urine; Future    Need for hepatitis C screening test  -     Hepatitis C antibody; Future    Other orders  -     sertraline (ZOLOFT) 50 MG tablet; Take 1 tablet by mouth Daily.  -     omeprazole (priLOSEC) 40 MG capsule; Take 1 capsule by mouth Daily.  -     lisinopril (PRINIVIL,ZESTRIL) 40 MG tablet; Take 0.5 tablets by mouth Daily.  -     gabapentin (NEURONTIN) 300 MG capsule; Take 1 capsule by mouth every night at bedtime.       Ryan is obtained and reviewed.  I will refill lisinopril, omeprazole, Zoloft and gabapentin for her as above.  I will also go  ahead and order her labs which will be obtained when fasting at her convenience.  She will continue to monitor her blood sugar and she will continue to follow a diet and exercise program.  She will return to clinic in 3-6 months for recheck or sooner as needed.  JEANNINE query complete. Treatment plan to include limited course of prescribed controlled substance. Risks including addiction, benefits, and alternatives presented to patient.  All questions and concerns are addressed with understanding noted. She is aware and in agreement to above plan.

## 2017-09-08 ENCOUNTER — LAB (OUTPATIENT)
Dept: LAB | Facility: OTHER | Age: 62
End: 2017-09-08

## 2017-09-08 DIAGNOSIS — G64 DISORDER OF PERIPHERAL NERVOUS SYSTEM: ICD-10-CM

## 2017-09-08 DIAGNOSIS — Z11.59 NEED FOR HEPATITIS C SCREENING TEST: ICD-10-CM

## 2017-09-08 DIAGNOSIS — I10 ESSENTIAL HYPERTENSION: ICD-10-CM

## 2017-09-08 DIAGNOSIS — E11.69 TYPE 2 DIABETES MELLITUS WITH OTHER SPECIFIED COMPLICATION (HCC): ICD-10-CM

## 2017-09-08 DIAGNOSIS — F41.9 ANXIETY: ICD-10-CM

## 2017-09-08 DIAGNOSIS — F32.A DEPRESSION, UNSPECIFIED DEPRESSION TYPE: ICD-10-CM

## 2017-09-08 DIAGNOSIS — E78.5 HYPERLIPIDEMIA, UNSPECIFIED HYPERLIPIDEMIA TYPE: ICD-10-CM

## 2017-09-08 DIAGNOSIS — K21.9 GASTROESOPHAGEAL REFLUX DISEASE WITHOUT ESOPHAGITIS: ICD-10-CM

## 2017-09-08 LAB
ALBUMIN SERPL-MCNC: 4.5 G/DL (ref 3.2–5.5)
ALBUMIN/GLOB SERPL: 1.5 G/DL (ref 1–3)
ALP SERPL-CCNC: 45 U/L (ref 15–121)
ALT SERPL W P-5'-P-CCNC: 25 U/L (ref 10–60)
ANION GAP SERPL CALCULATED.3IONS-SCNC: 11 MMOL/L (ref 5–15)
AST SERPL-CCNC: 27 U/L (ref 10–60)
BASOPHILS # BLD AUTO: 0.02 10*3/MM3 (ref 0–0.2)
BASOPHILS NFR BLD AUTO: 0.3 % (ref 0–2)
BILIRUB SERPL-MCNC: 0.7 MG/DL (ref 0.2–1)
BUN BLD-MCNC: 28 MG/DL (ref 8–25)
BUN/CREAT SERPL: 23.3 (ref 7–25)
CALCIUM SPEC-SCNC: 9.5 MG/DL (ref 8.4–10.8)
CHLORIDE SERPL-SCNC: 105 MMOL/L (ref 100–112)
CHOLEST SERPL-MCNC: 157 MG/DL (ref 150–200)
CO2 SERPL-SCNC: 24 MMOL/L (ref 20–32)
CREAT BLD-MCNC: 1.2 MG/DL (ref 0.4–1.3)
DEPRECATED RDW RBC AUTO: 44 FL (ref 36.4–46.3)
EOSINOPHIL # BLD AUTO: 0.07 10*3/MM3 (ref 0–0.7)
EOSINOPHIL NFR BLD AUTO: 1 % (ref 0–7)
ERYTHROCYTE [DISTWIDTH] IN BLOOD BY AUTOMATED COUNT: 13.6 % (ref 11.5–14.5)
GFR SERPL CREATININE-BSD FRML MDRD: 45 ML/MIN/1.73 (ref 45–104)
GLOBULIN UR ELPH-MCNC: 3.1 GM/DL (ref 2.5–4.6)
GLUCOSE BLD-MCNC: 179 MG/DL (ref 70–100)
HBA1C MFR BLD: 7.3 % (ref 4–5.6)
HCT VFR BLD AUTO: 35.3 % (ref 35–45)
HCV AB SER DONR QL: NEGATIVE
HDLC SERPL-MCNC: 64 MG/DL (ref 35–100)
HGB BLD-MCNC: 11.5 G/DL (ref 12–15.5)
LDLC SERPL CALC-MCNC: 78 MG/DL
LDLC/HDLC SERPL: 1.22 {RATIO}
LYMPHOCYTES # BLD AUTO: 1.77 10*3/MM3 (ref 0.6–4.2)
LYMPHOCYTES NFR BLD AUTO: 25.8 % (ref 10–50)
MCH RBC QN AUTO: 29.6 PG (ref 26.5–34)
MCHC RBC AUTO-ENTMCNC: 32.6 G/DL (ref 31.4–36)
MCV RBC AUTO: 91 FL (ref 80–98)
MONOCYTES # BLD AUTO: 0.47 10*3/MM3 (ref 0–0.9)
MONOCYTES NFR BLD AUTO: 6.9 % (ref 0–12)
NEUTROPHILS # BLD AUTO: 4.53 10*3/MM3 (ref 2–8.6)
NEUTROPHILS NFR BLD AUTO: 66 % (ref 37–80)
PLATELET # BLD AUTO: 223 10*3/MM3 (ref 150–450)
PMV BLD AUTO: 9.7 FL (ref 8–12)
POTASSIUM BLD-SCNC: 5 MMOL/L (ref 3.4–5.4)
PROT SERPL-MCNC: 7.6 G/DL (ref 6.7–8.2)
RBC # BLD AUTO: 3.88 10*6/MM3 (ref 3.77–5.16)
SODIUM BLD-SCNC: 140 MMOL/L (ref 134–146)
TRIGL SERPL-MCNC: 75 MG/DL (ref 35–160)
VLDLC SERPL-MCNC: 15 MG/DL
WBC NRBC COR # BLD: 6.86 10*3/MM3 (ref 3.2–9.8)

## 2017-09-08 PROCEDURE — 80307 DRUG TEST PRSMV CHEM ANLYZR: CPT | Performed by: NURSE PRACTITIONER

## 2017-09-08 PROCEDURE — 80053 COMPREHEN METABOLIC PANEL: CPT | Performed by: NURSE PRACTITIONER

## 2017-09-08 PROCEDURE — 36415 COLL VENOUS BLD VENIPUNCTURE: CPT | Performed by: NURSE PRACTITIONER

## 2017-09-08 PROCEDURE — 85025 COMPLETE CBC W/AUTO DIFF WBC: CPT | Performed by: NURSE PRACTITIONER

## 2017-09-08 PROCEDURE — 83036 HEMOGLOBIN GLYCOSYLATED A1C: CPT | Performed by: NURSE PRACTITIONER

## 2017-09-08 PROCEDURE — 86803 HEPATITIS C AB TEST: CPT | Performed by: NURSE PRACTITIONER

## 2017-09-08 PROCEDURE — 80061 LIPID PANEL: CPT | Performed by: NURSE PRACTITIONER

## 2017-09-11 LAB
AMPHETAMINES UR QL SCN: NEGATIVE NG/ML
BARBITURATES UR QL SCN: NEGATIVE NG/ML
BENZODIAZ UR QL SCN: NEGATIVE NG/ML
BZE UR QL SCN: NEGATIVE NG/ML
CANNABINOIDS UR QL SCN: NEGATIVE NG/ML
CREAT 24H UR-MCNC: 97.4 MG/DL (ref 20–300)
FENTANYL+NORFENTANYL UR QL SCN: NEGATIVE PG/ML
Lab: NORMAL
MEPERIDINE UR CFM-MCNC: NEGATIVE NG/ML
METHADONE UR QL SCN: NEGATIVE NG/ML
OPIATES TESTED UR SCN: NEGATIVE NG/ML
OXYCODONE/OXYMORPHONE, URINE: NEGATIVE NG/ML
PCP UR QL: NEGATIVE NG/ML
PH UR STRIP.AUTO: 5.5 [PH] (ref 4.5–8.9)
PROPOXYPH UR QL SCN: NEGATIVE NG/ML
SP GR UR: 1.01
TRAMADOL UR QL SCN: NEGATIVE NG/ML

## 2017-10-17 RX ORDER — GABAPENTIN 300 MG/1
300 CAPSULE ORAL
Qty: 30 CAPSULE | Refills: 0 | Status: SHIPPED | OUTPATIENT
Start: 2017-10-17 | End: 2017-11-22 | Stop reason: SDUPTHER

## 2017-11-14 ENCOUNTER — OFFICE VISIT (OUTPATIENT)
Dept: FAMILY MEDICINE CLINIC | Facility: CLINIC | Age: 62
End: 2017-11-14

## 2017-11-14 VITALS
SYSTOLIC BLOOD PRESSURE: 100 MMHG | BODY MASS INDEX: 23.82 KG/M2 | OXYGEN SATURATION: 98 % | HEIGHT: 65 IN | DIASTOLIC BLOOD PRESSURE: 64 MMHG | WEIGHT: 143 LBS | TEMPERATURE: 98.7 F | HEART RATE: 85 BPM

## 2017-11-14 DIAGNOSIS — G64 DISORDER OF PERIPHERAL NERVOUS SYSTEM: ICD-10-CM

## 2017-11-14 DIAGNOSIS — S99.921A INJURY OF TOENAIL OF RIGHT FOOT, INITIAL ENCOUNTER: Primary | ICD-10-CM

## 2017-11-14 DIAGNOSIS — E11.69 TYPE 2 DIABETES MELLITUS WITH OTHER SPECIFIED COMPLICATION, WITHOUT LONG-TERM CURRENT USE OF INSULIN (HCC): ICD-10-CM

## 2017-11-14 DIAGNOSIS — I10 ESSENTIAL HYPERTENSION: ICD-10-CM

## 2017-11-14 PROCEDURE — 99213 OFFICE O/P EST LOW 20 MIN: CPT | Performed by: NURSE PRACTITIONER

## 2017-11-14 RX ORDER — GLIPIZIDE 10 MG/1
TABLET ORAL
Qty: 60 TABLET | Refills: 5 | Status: SHIPPED | OUTPATIENT
Start: 2017-11-14 | End: 2017-12-07 | Stop reason: SDUPTHER

## 2017-11-15 DIAGNOSIS — S99.921A INJURY OF GREAT TOE, RIGHT, INITIAL ENCOUNTER: Primary | ICD-10-CM

## 2017-11-15 NOTE — PROGRESS NOTES
Subjective   Odessa Walker is a 62 y.o. female. Patient here today with complaints of Toe Injury (big toe, right foot is bruised x 1 week)  Patient here today with complaints of right great toenail being dark and she is unsure if she injured this.  Thinks her dog may have stepped on her toe.  She does have peripheral neuropathy and diabetes, relates blood sugar running 120-140, improved on new diabetic medication.  She reports her toe and now are becoming increasingly painful.    Vitals:    11/14/17 1345   BP: 100/64   Pulse: 85   Temp: 98.7 °F (37.1 °C)   SpO2: 98%     Past Medical History:   Diagnosis Date   • Benign essential hypertension    • Candidiasis of skin and nail     L foot   • Chest pain    • Chronic anemia    • Claudication    • Corns and callosities    • Depressive disorder    • Dermatitis    • Disease of nail     other specified   • Disorder of peripheral nervous system    • Displaced fracture of third metatarsal bone, right foot, initial encounter for closed fracture    • Displaced fracture of third metatarsal bone, right foot, subsequent encounter for fracture with routine healing    • Diverticular disease of colon    • Dog bite of hand    • Epigastric pain    • Essential hypertension    • Foot pain    • GERD (gastroesophageal reflux disease)    • H/O screening mammography    • Hallux valgus    • Heartburn    • Hyperlipidemia    • Joint pain    • Joint swelling    • Neurologic disorder associated with diabetes mellitus     type 2   • Nondisplaced fracture of fourth metatarsal bone, right foot, initial encounter for closed fracture    • Nondisplaced fracture of fourth metatarsal bone, right foot, subsequent encounter for fracture with routine healing    • Pain in right leg    • Superficial laceration of hand    • Type 2 diabetes mellitus    • Urinary tract infectious disease    • Venous insufficiency (chronic) (peripheral)      Toe Injury   This is a new problem. The current episode started in  the past 7 days. The problem occurs constantly. The problem has been gradually worsening. Nothing aggravates the symptoms. She has tried nothing for the symptoms. The treatment provided no relief.        The following portions of the patient's history were reviewed and updated as appropriate: allergies, current medications, past family history, past medical history, past social history, past surgical history and problem list.    Review of Systems   Constitutional: Negative.    HENT: Negative.    Eyes: Negative.    Respiratory: Negative.    Cardiovascular: Negative.    Gastrointestinal: Negative.    Endocrine: Negative.    Genitourinary: Negative.    Musculoskeletal: Negative.    Skin: Positive for wound.   Allergic/Immunologic: Negative.    Neurological: Negative.    Hematological: Negative.    Psychiatric/Behavioral: Negative.        Objective   Physical Exam   Constitutional: She is oriented to person, place, and time. She appears well-developed and well-nourished. No distress.   Cardiovascular: Normal rate, regular rhythm, normal heart sounds and intact distal pulses.  Exam reveals no gallop and no friction rub.    No murmur heard.  Pulmonary/Chest: Effort normal and breath sounds normal. No respiratory distress. She has no wheezes. She has no rales.   Musculoskeletal: She exhibits no edema.   Neurological: She is alert and oriented to person, place, and time.   Skin: Skin is warm and dry. No rash noted. She is not diaphoretic. There is erythema. No pallor.   Great toenail, right foot is dark and tender to patient with skin surrounding this mildly erythematous, nonedematous, not warm to touch.  DP and PT pulses are palpable   Psychiatric: She has a normal mood and affect. Her behavior is normal.   Nursing note and vitals reviewed.      Assessment/Plan   Odessa was seen today for toe injury.    Diagnoses and all orders for this visit:    Injury of toenail of right foot, initial encounter    Essential  hypertension    Type 2 diabetes mellitus with other specified complication, without long-term current use of insulin    Disorder of peripheral nervous system      She is referred back to podiatry, Dr. Elizondo for further evaluation and treatment as he deems necessary.  Blood sugars are improving and she will continue to monitor.  She will follow-up as scheduled for chronic conditions or here if conditions worsen prior to seeing podiatry.  She is aware and is in agreement to this plan.  All questions and concerns are addressed with understanding noted.

## 2017-11-21 ENCOUNTER — TELEPHONE (OUTPATIENT)
Dept: FAMILY MEDICINE CLINIC | Facility: CLINIC | Age: 62
End: 2017-11-21

## 2017-11-22 RX ORDER — GABAPENTIN 300 MG/1
300 CAPSULE ORAL
Qty: 30 CAPSULE | Refills: 0 | Status: SHIPPED | OUTPATIENT
Start: 2017-11-22 | End: 2017-12-07

## 2017-11-27 ENCOUNTER — TELEPHONE (OUTPATIENT)
Dept: FAMILY MEDICINE CLINIC | Facility: CLINIC | Age: 62
End: 2017-11-27

## 2017-11-27 ENCOUNTER — OFFICE VISIT (OUTPATIENT)
Dept: PODIATRY | Facility: CLINIC | Age: 62
End: 2017-11-27

## 2017-11-27 VITALS — BODY MASS INDEX: 23.82 KG/M2 | HEIGHT: 65 IN | WEIGHT: 143 LBS

## 2017-11-27 DIAGNOSIS — M79.672 FOOT PAIN, BILATERAL: ICD-10-CM

## 2017-11-27 DIAGNOSIS — M79.671 FOOT PAIN, BILATERAL: ICD-10-CM

## 2017-11-27 DIAGNOSIS — E11.42 TYPE 2 DIABETES MELLITUS WITH PERIPHERAL NEUROPATHY (HCC): ICD-10-CM

## 2017-11-27 DIAGNOSIS — B35.1 ONYCHOMYCOSIS: Primary | ICD-10-CM

## 2017-11-27 PROCEDURE — 11721 DEBRIDE NAIL 6 OR MORE: CPT | Performed by: PODIATRIST

## 2017-11-27 RX ORDER — LISINOPRIL 40 MG/1
20 TABLET ORAL DAILY
Qty: 15 TABLET | Refills: 5 | Status: SHIPPED | OUTPATIENT
Start: 2017-11-27 | End: 2018-04-19 | Stop reason: SDUPTHER

## 2017-11-27 NOTE — PROGRESS NOTES
Odessa Walker  1955  62 y.o. female   PCP: LEXY MESSINA 06/09/2017  [id number] this morning per patient.     Patient presents today for diabetic foot care and problem with the right hallux toenail.     11/27/17    Chief Complaint   Patient presents with   • Right Foot - Nail Problem, Diabetic Foot Care   • Left Foot - Diabetic Foot Care           History of Present Illness    Patient presents today for routine diabetic foot care.  States her nails the become long and painful especially walking.  She describes the pain as achy and constant when her nails are long.  Admits to numbness, burning and tingling in both her feet.  She states that her right great toenail is discolored. She does not remember injuring it. It is sore but is getting better.         Past Medical History:   Diagnosis Date   • Benign essential hypertension    • Candidiasis of skin and nail     L foot   • Chest pain    • Chronic anemia    • Claudication    • Corns and callosities    • Depressive disorder    • Dermatitis    • Disease of nail     other specified   • Disorder of peripheral nervous system    • Displaced fracture of third metatarsal bone, right foot, initial encounter for closed fracture    • Displaced fracture of third metatarsal bone, right foot, subsequent encounter for fracture with routine healing    • Diverticular disease of colon    • Dog bite of hand    • Epigastric pain    • Essential hypertension    • Foot pain    • GERD (gastroesophageal reflux disease)    • H/O screening mammography    • Hallux valgus    • Heartburn    • Hyperlipidemia    • Joint pain    • Joint swelling    • Neurologic disorder associated with diabetes mellitus     type 2   • Nondisplaced fracture of fourth metatarsal bone, right foot, initial encounter for closed fracture    • Nondisplaced fracture of fourth metatarsal bone, right foot, subsequent encounter for fracture with routine healing    • Pain in right leg    • Superficial laceration of hand     • Type 2 diabetes mellitus    • Urinary tract infectious disease    • Venous insufficiency (chronic) (peripheral)          Past Surgical History:   Procedure Laterality Date   • BACK SURGERY  2007   • COLONOSCOPY  11/11/2015    Diverticulosis found in the sigmoid colon. Hemorrhoids found in the anus.   • KNEE SURGERY  2005   • OTHER SURGICAL HISTORY  05/06/2015    DEBRIDE NAIL 6 OR MORE    • OTHER SURGICAL HISTORY  05/06/2015    PARING CORN/CALLUS    • OTHER SURGICAL HISTORY  10/25/2015    TREAT METATARSAL FRACTURE    • UPPER GASTROINTESTINAL ENDOSCOPY  11/11/2015    Esophagitis seen.Biopsy taken.A hiatus hernia was found in the esophagus.Gastritis found in the stomach.Biopsy taken   • US VENOUS EXTREMITY UNILATERAL  01/26/2016   • VAGINAL HYSTERECTOMY SALPINGO OOPHORECTOMY  1999         Family History   Problem Relation Age of Onset   • Heart disease Mother    • Osteoarthritis Father    • Diabetes Other          Social History     Social History   • Marital status:      Spouse name: N/A   • Number of children: N/A   • Years of education: N/A     Occupational History   • Not on file.     Social History Main Topics   • Smoking status: Former Smoker   • Smokeless tobacco: Never Used   • Alcohol use No   • Drug use: No   • Sexual activity: Defer     Other Topics Concern   • Not on file     Social History Narrative         Current Outpatient Prescriptions   Medication Sig Dispense Refill   • Cholecalciferol (VITAMIN D PO) Take  by mouth.     • gabapentin (NEURONTIN) 300 MG capsule Take 1 capsule by mouth every night at bedtime. 30 capsule 0   • glipiZIDE (GLUCOTROL) 10 MG tablet TAKE 1 TABLET BY MOUTH TWICE DAILY BEFORE MEALS 60 tablet 5   • lisinopril (PRINIVIL,ZESTRIL) 40 MG tablet Take 0.5 tablets by mouth Daily. 15 tablet 5   • metFORMIN (GLUCOPHAGE) 1000 MG tablet Take 1 tablet by mouth 2 (Two) Times a Day With Meals. 60 tablet 5   • omeprazole (priLOSEC) 40 MG capsule Take 1 capsule by mouth Daily. 30  "capsule 5   • ONETOUCH VERIO test strip USE TO TEST BLOOD SUGAR EVERY DAY 50 each 11   • sertraline (ZOLOFT) 50 MG tablet Take 1 tablet by mouth Daily. 60 tablet 5   • simvastatin (ZOCOR) 40 MG tablet TAKE 1 TABLET BY MOUTH DAILY AT BEDTIME 30 tablet 5   • sulindac (CLINORIL) 200 MG tablet TAKE 1 TABLET BY MOUTH TWICE DAILY 60 tablet 5   • TANZEUM 30 MG pen-injector INJECT 30MG SUB-Q ONCE A WEEK 1 each 2     No current facility-administered medications for this visit.          OBJECTIVE    Ht 65\" (165.1 cm)  Wt 143 lb (64.9 kg)  BMI 23.8 kg/m2      Review of Systems   Constitutional: Negative for chills and fever.   Cardiovascular: Negative for chest pain.   Gastrointestinal: Negative for constipation, diarrhea, nausea and vomiting.   Skin: Negative for wound. long painful toenails  Musculoskeletal: bilateral foot pain      Constitutional: well developed, well nourished    HEENT: Normocephalic and atraumatic, normal hearing    Respiratory: Non labored respirations noted    Cardiovascular:    DP/PT pulses faintly palpable    CFT brisk  to all digits  Skin temp is warm to warm from proximal tibia to distal digits  Pedal hair growth absent.   No erythema noted  Edema noted to b/l LEs    Musculoskeletal:  Muscle strength is 5/5 for all muscle groups tested   Moderate to severe hallux valgus noted right, mild to moderate hallux valgus left  Rigidly contracted second digit on the right, reducibly contracted digits 3,4 and 5 right  Reducibly contracted digits 2 through 5 left  Diminished fat pad bilateral  Pain with first MPJ range of motion bilateral  POP to toenails    Dermatological:   Nails 1-5 are thickened, elongated and discolored with subungal debris bilateral, Right hallux nail with subungual hematoma.  It is slightly tender to palpation.   Skin is warm, dry and intact    Webspaces 1-4 bilateral are clean, dry and intact.   No subcutaneous nodules or masses noted    No open wounds noted     Neurological: "   Protective sensation diminished  Sensation intact to light touch    DTR intact    Psychiatric: A&O x 3 with normal mood and affect. NAD.         Procedures        ASSESSMENT AND PLAN    Odessa was seen today for nail problem, diabetic foot care and diabetic foot care.    Diagnoses and all orders for this visit:    Onychomycosis    Foot pain, bilateral    Type 2 diabetes mellitus with peripheral neuropathy    - Nails 1 through 5 bilateral were debrided in length and thickness without incident utilizing nail nippers.  - will  monitor right hallux.   - All questions were answered    - RTC  3 months or sooner if right hallux does not continue to improve         This document has been electronically signed by Ge Elizondo DPM on November 27, 2017 4:42 PM     11/27/2017  4:42 PM

## 2017-12-07 ENCOUNTER — OFFICE VISIT (OUTPATIENT)
Dept: FAMILY MEDICINE CLINIC | Facility: CLINIC | Age: 62
End: 2017-12-07

## 2017-12-07 VITALS
HEART RATE: 83 BPM | SYSTOLIC BLOOD PRESSURE: 122 MMHG | HEIGHT: 65 IN | BODY MASS INDEX: 23.66 KG/M2 | WEIGHT: 142 LBS | DIASTOLIC BLOOD PRESSURE: 62 MMHG | OXYGEN SATURATION: 98 %

## 2017-12-07 DIAGNOSIS — I10 ESSENTIAL HYPERTENSION: Primary | Chronic | ICD-10-CM

## 2017-12-07 DIAGNOSIS — E78.5 HYPERLIPIDEMIA, UNSPECIFIED HYPERLIPIDEMIA TYPE: Chronic | ICD-10-CM

## 2017-12-07 DIAGNOSIS — E11.69 TYPE 2 DIABETES MELLITUS WITH OTHER SPECIFIED COMPLICATION, WITHOUT LONG-TERM CURRENT USE OF INSULIN (HCC): Chronic | ICD-10-CM

## 2017-12-07 PROCEDURE — 99213 OFFICE O/P EST LOW 20 MIN: CPT | Performed by: NURSE PRACTITIONER

## 2017-12-07 RX ORDER — SIMVASTATIN 40 MG
40 TABLET ORAL NIGHTLY
Qty: 30 TABLET | Refills: 5 | Status: SHIPPED | OUTPATIENT
Start: 2017-12-07 | End: 2018-09-04 | Stop reason: SDUPTHER

## 2017-12-07 RX ORDER — OMEPRAZOLE 40 MG/1
40 CAPSULE, DELAYED RELEASE ORAL DAILY
Qty: 30 CAPSULE | Refills: 5 | Status: CANCELLED | OUTPATIENT
Start: 2017-12-07

## 2017-12-07 RX ORDER — GLIPIZIDE 10 MG/1
10 TABLET ORAL
Qty: 60 TABLET | Refills: 5 | Status: SHIPPED | OUTPATIENT
Start: 2017-12-07 | End: 2018-06-25 | Stop reason: SDUPTHER

## 2017-12-07 RX ORDER — LISINOPRIL 40 MG/1
20 TABLET ORAL DAILY
Qty: 15 TABLET | Refills: 5 | Status: CANCELLED | OUTPATIENT
Start: 2017-12-07

## 2017-12-07 RX ORDER — SULINDAC 200 MG/1
200 TABLET ORAL 2 TIMES DAILY
Qty: 60 TABLET | Refills: 5 | Status: SHIPPED | OUTPATIENT
Start: 2017-12-07 | End: 2018-10-31 | Stop reason: SDUPTHER

## 2017-12-07 NOTE — PROGRESS NOTES
"Subjective   Odessa Walker is a 62 y.o. female. Patient here today with complaints of Follow-up    62 year old female presenting to the clinic today for follow-up diabetes.  She reports that she is overall feeling well.  She reports that she has been having some fatigue the past year and that she isn't able to \"walk her dog as far\" as she was able to previously.  Her blood sugars from the past 5 days are around 120-180 and she says that she was unable to refill her metformin for a few days. She has stopped taking gabapentin due to sedation and states she feels better not taking it. She is needing some refills and is due for some labs as well.      Vitals:    12/07/17 1057   BP: 122/62   Pulse: 83   SpO2: 98%     Past Medical History:   Diagnosis Date   • Benign essential hypertension    • Candidiasis of skin and nail     L foot   • Chest pain    • Chronic anemia    • Claudication    • Corns and callosities    • Depressive disorder    • Dermatitis    • Disease of nail     other specified   • Disorder of peripheral nervous system    • Displaced fracture of third metatarsal bone, right foot, initial encounter for closed fracture    • Displaced fracture of third metatarsal bone, right foot, subsequent encounter for fracture with routine healing    • Diverticular disease of colon    • Dog bite of hand    • Epigastric pain    • Essential hypertension    • Foot pain    • GERD (gastroesophageal reflux disease)    • H/O screening mammography    • Hallux valgus    • Heartburn    • Hyperlipidemia    • Joint pain    • Joint swelling    • Neurologic disorder associated with diabetes mellitus     type 2   • Nondisplaced fracture of fourth metatarsal bone, right foot, initial encounter for closed fracture    • Nondisplaced fracture of fourth metatarsal bone, right foot, subsequent encounter for fracture with routine healing    • Pain in right leg    • Superficial laceration of hand    • Type 2 diabetes mellitus    • Urinary " tract infectious disease    • Venous insufficiency (chronic) (peripheral)      History of Present Illness     The following portions of the patient's history were reviewed and updated as appropriate: allergies, current medications, past family history, past medical history, past social history, past surgical history and problem list.    Review of Systems   Constitutional: Positive for fatigue.   HENT: Negative.    Eyes: Negative.    Respiratory: Negative.    Cardiovascular: Negative.    Gastrointestinal: Negative.    Endocrine: Negative.    Genitourinary: Negative.    Musculoskeletal: Negative.    Skin: Negative.    Allergic/Immunologic: Negative.    Neurological: Negative.    Hematological: Negative.    Psychiatric/Behavioral: Negative.        Objective   Physical Exam   Constitutional: She is oriented to person, place, and time. She appears well-developed and well-nourished. No distress.   HENT:   Head: Normocephalic and atraumatic.   Eyes: EOM are normal. Pupils are equal, round, and reactive to light.   Neck: Normal range of motion. Neck supple. Normal carotid pulses present. Carotid bruit is not present.   Cardiovascular: Normal rate, regular rhythm, normal heart sounds and intact distal pulses.  Exam reveals no gallop and no friction rub.    No murmur heard.  Pulmonary/Chest: Effort normal and breath sounds normal. No respiratory distress. She has no wheezes. She has no rales.   Abdominal: Soft. Bowel sounds are normal.   Musculoskeletal: Normal range of motion. She exhibits no edema.   Neurological: She is alert and oriented to person, place, and time.   Skin: Skin is warm and dry. No rash noted. She is not diaphoretic. No erythema. No pallor.   Psychiatric: She has a normal mood and affect. Her behavior is normal.   Nursing note and vitals reviewed.      Assessment/Plan   Odessa was seen today for follow-up.    Diagnoses and all orders for this visit:    Essential hypertension  -     Comprehensive Metabolic  Panel; Future  -     CBC & Differential; Future  -     Iron and TIBC; Future  -     Ferritin; Future  -     Vitamin B12; Future  -     Folate; Future    Hyperlipidemia, unspecified hyperlipidemia type  -     Comprehensive Metabolic Panel; Future  -     CBC & Differential; Future  -     Iron and TIBC; Future  -     Ferritin; Future  -     Vitamin B12; Future  -     Folate; Future    Type 2 diabetes mellitus with other specified complication, without long-term current use of insulin  -     Comprehensive Metabolic Panel; Future  -     CBC & Differential; Future  -     Iron and TIBC; Future  -     Ferritin; Future  -     Vitamin B12; Future  -     Folate; Future    Other orders  -     glipiZIDE (GLUCOTROL) 10 MG tablet; Take 1 tablet by mouth 2 (Two) Times a Day Before Meals.  -     Cancel: Glucosamine HCl 1000 MG tablet; Take  by mouth.  -     Cancel: lisinopril (PRINIVIL,ZESTRIL) 40 MG tablet; Take 0.5 tablets by mouth Daily.  -     Cancel: omeprazole (priLOSEC) 40 MG capsule; Take 1 capsule by mouth Daily.  -     glucose blood (ONETOUCH VERIO) test strip; 1 each by Other route As Needed (diabetes). Use as instructed  -     sertraline (ZOLOFT) 50 MG tablet; Take 1 tablet by mouth Daily.  -     simvastatin (ZOCOR) 40 MG tablet; Take 1 tablet by mouth Every Night.  -     sulindac (CLINORIL) 200 MG tablet; Take 1 tablet by mouth 2 (Two) Times a Day.      Plan of care reviewed with patient. She will have labs drawn and will call with results.  Previous labs are reviewed.  She is given refills as above.  She is to return to clinic in 3 months for recheck or sooner as needed.  She is aware and is in agreement to this plan.  All questions and concerns are addressed with understanding noted.

## 2017-12-12 ENCOUNTER — LAB (OUTPATIENT)
Dept: LAB | Facility: OTHER | Age: 62
End: 2017-12-12

## 2017-12-12 DIAGNOSIS — R73.09 ELEVATED GLUCOSE: ICD-10-CM

## 2017-12-12 DIAGNOSIS — I10 ESSENTIAL HYPERTENSION: ICD-10-CM

## 2017-12-12 DIAGNOSIS — E11.69 TYPE 2 DIABETES MELLITUS WITH OTHER SPECIFIED COMPLICATION, WITHOUT LONG-TERM CURRENT USE OF INSULIN (HCC): ICD-10-CM

## 2017-12-12 DIAGNOSIS — E78.5 HYPERLIPIDEMIA, UNSPECIFIED HYPERLIPIDEMIA TYPE: ICD-10-CM

## 2017-12-12 LAB
ALBUMIN SERPL-MCNC: 4.5 G/DL (ref 3.2–5.5)
ALBUMIN/GLOB SERPL: 1.5 G/DL (ref 1–3)
ALP SERPL-CCNC: 39 U/L (ref 15–121)
ALT SERPL W P-5'-P-CCNC: 20 U/L (ref 10–60)
ANION GAP SERPL CALCULATED.3IONS-SCNC: 12 MMOL/L (ref 5–15)
AST SERPL-CCNC: 28 U/L (ref 10–60)
BASOPHILS # BLD AUTO: 0.03 10*3/MM3 (ref 0–0.2)
BASOPHILS NFR BLD AUTO: 0.4 % (ref 0–2)
BILIRUB SERPL-MCNC: 0.9 MG/DL (ref 0.2–1)
BUN BLD-MCNC: 25 MG/DL (ref 8–25)
BUN/CREAT SERPL: 22.7 (ref 7–25)
CALCIUM SPEC-SCNC: 9.8 MG/DL (ref 8.4–10.8)
CHLORIDE SERPL-SCNC: 107 MMOL/L (ref 100–112)
CO2 SERPL-SCNC: 24 MMOL/L (ref 20–32)
CREAT BLD-MCNC: 1.1 MG/DL (ref 0.4–1.3)
DEPRECATED RDW RBC AUTO: 45.5 FL (ref 36.4–46.3)
EOSINOPHIL # BLD AUTO: 0.08 10*3/MM3 (ref 0–0.7)
EOSINOPHIL NFR BLD AUTO: 1.1 % (ref 0–7)
ERYTHROCYTE [DISTWIDTH] IN BLOOD BY AUTOMATED COUNT: 13.8 % (ref 11.5–14.5)
FERRITIN SERPL-MCNC: 18.3 NG/ML (ref 11.1–264)
GFR SERPL CREATININE-BSD FRML MDRD: 50 ML/MIN/1.73 (ref 45–104)
GLOBULIN UR ELPH-MCNC: 3.1 GM/DL (ref 2.5–4.6)
GLUCOSE BLD-MCNC: 154 MG/DL (ref 70–100)
HCT VFR BLD AUTO: 34.8 % (ref 35–45)
HGB BLD-MCNC: 11.2 G/DL (ref 12–15.5)
IRON 24H UR-MRATE: 77 MCG/DL (ref 37–170)
IRON SATN MFR SERPL: 19 % (ref 15–50)
LYMPHOCYTES # BLD AUTO: 2.17 10*3/MM3 (ref 0.6–4.2)
LYMPHOCYTES NFR BLD AUTO: 31 % (ref 10–50)
MCH RBC QN AUTO: 29.9 PG (ref 26.5–34)
MCHC RBC AUTO-ENTMCNC: 32.2 G/DL (ref 31.4–36)
MCV RBC AUTO: 92.8 FL (ref 80–98)
MONOCYTES # BLD AUTO: 0.62 10*3/MM3 (ref 0–0.9)
MONOCYTES NFR BLD AUTO: 8.8 % (ref 0–12)
NEUTROPHILS # BLD AUTO: 4.11 10*3/MM3 (ref 2–8.6)
NEUTROPHILS NFR BLD AUTO: 58.7 % (ref 37–80)
PLATELET # BLD AUTO: 269 10*3/MM3 (ref 150–450)
PMV BLD AUTO: 10.1 FL (ref 8–12)
POTASSIUM BLD-SCNC: 5 MMOL/L (ref 3.4–5.4)
PROT SERPL-MCNC: 7.6 G/DL (ref 6.7–8.2)
RBC # BLD AUTO: 3.75 10*6/MM3 (ref 3.77–5.16)
SODIUM BLD-SCNC: 143 MMOL/L (ref 134–146)
TIBC SERPL-MCNC: 410 MCG/DL (ref 265–497)
WBC NRBC COR # BLD: 7.01 10*3/MM3 (ref 3.2–9.8)

## 2017-12-12 PROCEDURE — 83036 HEMOGLOBIN GLYCOSYLATED A1C: CPT | Performed by: NURSE PRACTITIONER

## 2017-12-12 PROCEDURE — 83540 ASSAY OF IRON: CPT | Performed by: NURSE PRACTITIONER

## 2017-12-12 PROCEDURE — 82746 ASSAY OF FOLIC ACID SERUM: CPT | Performed by: NURSE PRACTITIONER

## 2017-12-12 PROCEDURE — 80053 COMPREHEN METABOLIC PANEL: CPT | Performed by: NURSE PRACTITIONER

## 2017-12-12 PROCEDURE — 36415 COLL VENOUS BLD VENIPUNCTURE: CPT | Performed by: NURSE PRACTITIONER

## 2017-12-12 PROCEDURE — 83550 IRON BINDING TEST: CPT | Performed by: NURSE PRACTITIONER

## 2017-12-12 PROCEDURE — 85025 COMPLETE CBC W/AUTO DIFF WBC: CPT | Performed by: NURSE PRACTITIONER

## 2017-12-12 PROCEDURE — 82728 ASSAY OF FERRITIN: CPT | Performed by: NURSE PRACTITIONER

## 2017-12-12 PROCEDURE — 82607 VITAMIN B-12: CPT | Performed by: NURSE PRACTITIONER

## 2017-12-13 DIAGNOSIS — R73.09 ELEVATED GLUCOSE: Primary | ICD-10-CM

## 2017-12-13 LAB
FOLATE SERPL-MCNC: >20 NG/ML (ref 2.76–21)
VIT B12 BLD-MCNC: 573 PG/ML (ref 239–931)

## 2017-12-14 LAB — HBA1C MFR BLD: 8 % (ref 4–5.6)

## 2018-02-28 DIAGNOSIS — E10.9 TYPE 1 DIABETES MELLITUS WITHOUT COMPLICATION (HCC): Primary | ICD-10-CM

## 2018-03-02 ENCOUNTER — LAB (OUTPATIENT)
Dept: LAB | Facility: OTHER | Age: 63
End: 2018-03-02

## 2018-03-02 DIAGNOSIS — E10.9 TYPE 1 DIABETES MELLITUS WITHOUT COMPLICATION (HCC): ICD-10-CM

## 2018-03-02 LAB — HBA1C MFR BLD: 7.7 % (ref 4–5.6)

## 2018-03-02 PROCEDURE — 83036 HEMOGLOBIN GLYCOSYLATED A1C: CPT | Performed by: NURSE PRACTITIONER

## 2018-03-07 ENCOUNTER — OFFICE VISIT (OUTPATIENT)
Dept: FAMILY MEDICINE CLINIC | Facility: CLINIC | Age: 63
End: 2018-03-07

## 2018-03-07 VITALS
DIASTOLIC BLOOD PRESSURE: 72 MMHG | HEART RATE: 95 BPM | TEMPERATURE: 97 F | BODY MASS INDEX: 22.82 KG/M2 | OXYGEN SATURATION: 98 % | HEIGHT: 65 IN | WEIGHT: 137 LBS | SYSTOLIC BLOOD PRESSURE: 128 MMHG

## 2018-03-07 DIAGNOSIS — I10 ESSENTIAL HYPERTENSION: Chronic | ICD-10-CM

## 2018-03-07 DIAGNOSIS — E78.5 HYPERLIPIDEMIA, UNSPECIFIED HYPERLIPIDEMIA TYPE: Chronic | ICD-10-CM

## 2018-03-07 DIAGNOSIS — K21.9 GASTROESOPHAGEAL REFLUX DISEASE WITHOUT ESOPHAGITIS: Chronic | ICD-10-CM

## 2018-03-07 DIAGNOSIS — M79.641 RIGHT HAND PAIN: ICD-10-CM

## 2018-03-07 DIAGNOSIS — E11.69 TYPE 2 DIABETES MELLITUS WITH OTHER SPECIFIED COMPLICATION, WITHOUT LONG-TERM CURRENT USE OF INSULIN (HCC): Chronic | ICD-10-CM

## 2018-03-07 DIAGNOSIS — F41.9 ANXIETY: Chronic | ICD-10-CM

## 2018-03-07 DIAGNOSIS — G64 DISORDER OF PERIPHERAL NERVOUS SYSTEM: Chronic | ICD-10-CM

## 2018-03-07 DIAGNOSIS — F32.A DEPRESSION, UNSPECIFIED DEPRESSION TYPE: Primary | Chronic | ICD-10-CM

## 2018-03-07 PROCEDURE — 99214 OFFICE O/P EST MOD 30 MIN: CPT | Performed by: NURSE PRACTITIONER

## 2018-03-07 NOTE — PROGRESS NOTES
Subjective   Odessa Walker is a 62 y.o. female. Patient here today with complaints of Follow-up (3 month ) and Left knee and right hand pain  Patient here today for recheck of anxiety and depression, needing refills on Zoloft.  Is tolerating well and denies side effects of this.  She does complain of left knee pain and right hand/thumb pain, is using Tylenol which relieves her pain.  Also has history of diabetes mellitus, hypertension, hyperlipidemia, GERD and neuropathy.  Denies headache, shortness of breath, chest pain.    Vitals:    03/07/18 1313   BP: 128/72   Pulse: 95   Temp: 97 °F (36.1 °C)   SpO2: 98%     Past Medical History:   Diagnosis Date   • Benign essential hypertension    • Candidiasis of skin and nail     L foot   • Chest pain    • Chronic anemia    • Claudication    • Corns and callosities    • Depressive disorder    • Dermatitis    • Disease of nail     other specified   • Disorder of peripheral nervous system    • Displaced fracture of third metatarsal bone, right foot, initial encounter for closed fracture    • Displaced fracture of third metatarsal bone, right foot, subsequent encounter for fracture with routine healing    • Diverticular disease of colon    • Dog bite of hand    • Epigastric pain    • Essential hypertension    • Foot pain    • GERD (gastroesophageal reflux disease)    • H/O screening mammography    • Hallux valgus    • Heartburn    • Hyperlipidemia    • Joint pain    • Joint swelling    • Neurologic disorder associated with diabetes mellitus     type 2   • Nondisplaced fracture of fourth metatarsal bone, right foot, initial encounter for closed fracture    • Nondisplaced fracture of fourth metatarsal bone, right foot, subsequent encounter for fracture with routine healing    • Pain in right leg    • Superficial laceration of hand    • Type 2 diabetes mellitus    • Urinary tract infectious disease    • Venous insufficiency (chronic) (peripheral)      Hand Pain    The  incident occurred more than 1 week ago. There was no injury mechanism. The pain is present in the right hand. The quality of the pain is described as aching. The pain does not radiate. The pain is at a severity of 2/10. The pain is mild. The pain has been constant since the incident. Pertinent negatives include no chest pain, muscle weakness, numbness or tingling. The symptoms are aggravated by movement, palpation and lifting. She has tried rest and acetaminophen for the symptoms. The treatment provided moderate relief.   Hypertension   This is a chronic problem. The current episode started more than 1 year ago. The problem is unchanged. The problem is controlled. Pertinent negatives include no blurred vision, chest pain, headaches, malaise/fatigue, neck pain, orthopnea, palpitations, peripheral edema, PND, shortness of breath or sweats. There are no associated agents to hypertension. Risk factors for coronary artery disease include diabetes mellitus and post-menopausal state. Past treatments include lifestyle changes and ACE inhibitors. The current treatment provides moderate improvement. Compliance problems include exercise, diet and psychosocial issues.    Diabetes   She presents for her follow-up diabetic visit. She has type 2 diabetes mellitus. Her disease course has been stable. There are no hypoglycemic associated symptoms. Pertinent negatives for hypoglycemia include no headaches or sweats. There are no diabetic associated symptoms. Pertinent negatives for diabetes include no blurred vision, no chest pain and no weight loss. There are no hypoglycemic complications. Symptoms are stable. Risk factors for coronary artery disease include diabetes mellitus, hypertension and post-menopausal. Current diabetic treatment includes diet and oral agent (dual therapy). She is compliant with treatment most of the time. Her weight is decreasing steadily. She is following a generally healthy diet. Her home blood glucose  trend is decreasing steadily. An ACE inhibitor/angiotensin II receptor blocker is being taken.   Depression   Visit Type: follow-up  Patient is not experiencing: decreased concentration, feelings of hopelessness, palpitations, panic, psychomotor agitation, psychomotor retardation, restlessness, shortness of breath, suicidal ideas, suicidal planning, thoughts of death, weight gain and weight loss.  Frequency of symptoms: occasionally   Severity: mild   Compliance with medications:  %             The following portions of the patient's history were reviewed and updated as appropriate: allergies, current medications, past family history, past medical history, past social history, past surgical history and problem list.    Review of Systems   Constitutional: Negative.  Negative for malaise/fatigue, weight gain and weight loss.   HENT: Negative.    Eyes: Negative.  Negative for blurred vision.   Respiratory: Negative.  Negative for shortness of breath.    Cardiovascular: Negative.  Negative for chest pain, palpitations, orthopnea and PND.   Gastrointestinal: Negative.    Endocrine: Negative.    Genitourinary: Negative.    Musculoskeletal: Positive for arthralgias and joint swelling. Negative for neck pain.   Skin: Negative.    Allergic/Immunologic: Negative.    Neurological: Negative.  Negative for tingling, numbness and headaches.   Hematological: Negative.    Psychiatric/Behavioral: Negative.  Negative for decreased concentration and suicidal ideas.       Objective   Physical Exam   Constitutional: She is oriented to person, place, and time. She appears well-developed and well-nourished. No distress.   HENT:   Head: Normocephalic and atraumatic.   Cardiovascular: Normal rate, regular rhythm and normal heart sounds.  Exam reveals no gallop and no friction rub.    No murmur heard.  Pulmonary/Chest: Effort normal and breath sounds normal. No respiratory distress. She has no wheezes. She has no rales.    Musculoskeletal: She exhibits tenderness. She exhibits no edema.        Left knee: Tenderness found.        Right hand: She exhibits decreased range of motion, tenderness, bony tenderness and swelling. She exhibits normal capillary refill, no deformity and no laceration. Decreased strength noted. She exhibits finger abduction and thumb/finger opposition. She exhibits no wrist extension trouble.   Radial pulse palp ,  decreased, pain to palp to base of R thumb   Neurological: She is alert and oriented to person, place, and time.   Skin: Skin is warm and dry. No rash noted. She is not diaphoretic. No erythema. No pallor.   Psychiatric: She has a normal mood and affect. Her behavior is normal.   Nursing note and vitals reviewed.      Assessment/Plan   Odessa was seen today for follow-up and left knee and right hand pain.    Diagnoses and all orders for this visit:    Depression, unspecified depression type    Type 2 diabetes mellitus with other specified complication, without long-term current use of insulin    Essential hypertension    Hyperlipidemia, unspecified hyperlipidemia type    Gastroesophageal reflux disease without esophagitis    Disorder of peripheral nervous system    Anxiety    Right hand pain  -     XR Hand 3+ View Right    Other orders  -     sertraline (ZOLOFT) 50 MG tablet; Take 1 tablet by mouth Daily.    I will x-ray right heel and inform her of results   Zoloft is refilled for her today   She is to return in 3 months for recheck or sooner if needed   She declines Toradol/steroid injection today for joint pain   Will continue with Tylenol when necessary pain and return here sooner as above if needed for pain, etc.    She is aware and is in agreement to this plan   All questions and concerns are addressed with understanding noted.

## 2018-03-12 ENCOUNTER — TELEPHONE (OUTPATIENT)
Dept: FAMILY MEDICINE CLINIC | Facility: CLINIC | Age: 63
End: 2018-03-12

## 2018-04-11 ENCOUNTER — OFFICE VISIT (OUTPATIENT)
Dept: FAMILY MEDICINE CLINIC | Facility: CLINIC | Age: 63
End: 2018-04-11

## 2018-04-11 VITALS
SYSTOLIC BLOOD PRESSURE: 118 MMHG | HEART RATE: 92 BPM | WEIGHT: 136.4 LBS | DIASTOLIC BLOOD PRESSURE: 70 MMHG | HEIGHT: 65 IN | TEMPERATURE: 98.9 F | BODY MASS INDEX: 22.73 KG/M2

## 2018-04-11 DIAGNOSIS — E11.69 TYPE 2 DIABETES MELLITUS WITH OTHER SPECIFIED COMPLICATION, WITHOUT LONG-TERM CURRENT USE OF INSULIN (HCC): Chronic | ICD-10-CM

## 2018-04-11 DIAGNOSIS — Z96.651 HISTORY OF TOTAL KNEE ARTHROPLASTY, RIGHT: ICD-10-CM

## 2018-04-11 DIAGNOSIS — M25.561 ACUTE PAIN OF RIGHT KNEE: Primary | ICD-10-CM

## 2018-04-11 PROCEDURE — 99213 OFFICE O/P EST LOW 20 MIN: CPT | Performed by: NURSE PRACTITIONER

## 2018-04-11 NOTE — PROGRESS NOTES
"Subjective   Odessa Walker is a 63 y.o. female. Patient here today with complaints of Knee Pain (right )  Patient here today with complaints of right knee pain which started after she had walked up a few stairs a few days ago.  She has a history of right total knee 20 years ago or greater.  She reports that she can feel something \"sticking inside \"her knee.  Pain mainly anteriorly but really all over.  Pain worse with walking, standing, flexing and extending, pain at rest tolerable, rates pain currently while sitting in the exam room 1/10 on pain scale.    Vitals:    04/11/18 1045   BP: 118/70   Pulse: 92   Temp: 98.9 °F (37.2 °C)     Past Medical History:   Diagnosis Date   • Benign essential hypertension    • Candidiasis of skin and nail     L foot   • Chest pain    • Chronic anemia    • Claudication    • Corns and callosities    • Depressive disorder    • Dermatitis    • Disease of nail     other specified   • Disorder of peripheral nervous system    • Displaced fracture of third metatarsal bone, right foot, initial encounter for closed fracture    • Displaced fracture of third metatarsal bone, right foot, subsequent encounter for fracture with routine healing    • Diverticular disease of colon    • Dog bite of hand    • Epigastric pain    • Essential hypertension    • Foot pain    • GERD (gastroesophageal reflux disease)    • H/O screening mammography    • Hallux valgus    • Heartburn    • Hyperlipidemia    • Joint pain    • Joint swelling    • Neurologic disorder associated with diabetes mellitus     type 2   • Nondisplaced fracture of fourth metatarsal bone, right foot, initial encounter for closed fracture    • Nondisplaced fracture of fourth metatarsal bone, right foot, subsequent encounter for fracture with routine healing    • Pain in right leg    • Superficial laceration of hand    • Type 2 diabetes mellitus    • Urinary tract infectious disease    • Venous insufficiency (chronic) (peripheral)  "     Knee Pain    The incident occurred 5 to 7 days ago. There was no injury mechanism. The pain is present in the right knee. The quality of the pain is described as aching and stabbing. The pain is at a severity of 1/10. The pain is mild. The pain has been fluctuating since onset. Associated symptoms include an inability to bear weight and a loss of motion. Pertinent negatives include no loss of sensation, muscle weakness, numbness or tingling. She reports no foreign bodies present. The symptoms are aggravated by movement, palpation and weight bearing. She has tried rest for the symptoms. The treatment provided no relief.        The following portions of the patient's history were reviewed and updated as appropriate: allergies, current medications, past family history, past medical history, past social history, past surgical history and problem list.    Review of Systems   Constitutional: Negative.    HENT: Negative.    Eyes: Negative.    Respiratory: Negative.    Cardiovascular: Negative.    Gastrointestinal: Negative.    Endocrine: Negative.    Genitourinary: Negative.    Musculoskeletal: Negative.    Skin: Negative.    Allergic/Immunologic: Negative.    Neurological: Negative.  Negative for tingling and numbness.   Hematological: Negative.    Psychiatric/Behavioral: Negative.        Objective   Physical Exam   Constitutional: She is oriented to person, place, and time. She appears well-developed and well-nourished. No distress.   HENT:   Head: Normocephalic and atraumatic.   Cardiovascular: Normal rate, regular rhythm and normal heart sounds.  Exam reveals no gallop and no friction rub.    No murmur heard.  Pulmonary/Chest: Effort normal and breath sounds normal. No respiratory distress. She has no wheezes. She has no rales.   Musculoskeletal: She exhibits tenderness and deformity.        Right knee: She exhibits decreased range of motion, swelling, deformity and bony tenderness. She exhibits no effusion, no  ecchymosis and no laceration. Tenderness found. Medial joint line and lateral joint line tenderness noted.   Neurological: She is alert and oriented to person, place, and time.   Skin: Skin is warm and dry. No rash noted. She is not diaphoretic. No erythema. No pallor.   Psychiatric: She has a normal mood and affect.   Nursing note and vitals reviewed.      Assessment/Plan   Odessa was seen today for knee pain.    Diagnoses and all orders for this visit:    Acute pain of right knee  -     XR Knee 3 View Right    History of total knee arthroplasty, right    Type 2 diabetes mellitus with other specified complication, without long-term current use of insulin    BMI 22.0-22.9, adult    Other orders  -     ONE TOUCH LANCETS misc; 1 applicator Daily.    She is given refills on lancets today   I will obtain x-ray of right knee, inform her of results and refer her back to orthopedic as nec  She is aware it is agreement to this plan   If her symptoms worsen prior to above she will return here for follow-up otherwise I will see her back as scheduled for her chronic conditions   All questions and concerns are addressed with understanding noted.

## 2018-04-19 RX ORDER — LISINOPRIL 40 MG/1
TABLET ORAL
Qty: 15 TABLET | Refills: 5 | Status: SHIPPED | OUTPATIENT
Start: 2018-04-19 | End: 2018-12-06 | Stop reason: SDUPTHER

## 2018-04-19 RX ORDER — DULAGLUTIDE 0.75 MG/.5ML
INJECTION, SOLUTION SUBCUTANEOUS
Qty: 2 ML | Refills: 2 | Status: SHIPPED | OUTPATIENT
Start: 2018-04-19 | End: 2018-07-17 | Stop reason: SDUPTHER

## 2018-05-22 RX ORDER — OMEPRAZOLE 40 MG/1
40 CAPSULE, DELAYED RELEASE ORAL DAILY
Qty: 30 CAPSULE | Refills: 5 | Status: SHIPPED | OUTPATIENT
Start: 2018-05-22 | End: 2018-11-23 | Stop reason: SDUPTHER

## 2018-05-30 ENCOUNTER — OFFICE VISIT (OUTPATIENT)
Dept: FAMILY MEDICINE CLINIC | Facility: CLINIC | Age: 63
End: 2018-05-30

## 2018-05-30 VITALS
HEART RATE: 78 BPM | OXYGEN SATURATION: 98 % | WEIGHT: 132 LBS | BODY MASS INDEX: 21.99 KG/M2 | HEIGHT: 65 IN | DIASTOLIC BLOOD PRESSURE: 62 MMHG | SYSTOLIC BLOOD PRESSURE: 116 MMHG

## 2018-05-30 DIAGNOSIS — M25.561 ACUTE PAIN OF RIGHT KNEE: Primary | ICD-10-CM

## 2018-05-30 DIAGNOSIS — Z96.651 HX OF TOTAL KNEE REPLACEMENT, RIGHT: Primary | ICD-10-CM

## 2018-05-30 DIAGNOSIS — R52 ACUTE PAIN: ICD-10-CM

## 2018-05-30 DIAGNOSIS — Z96.651 HISTORY OF TOTAL RIGHT KNEE REPLACEMENT: ICD-10-CM

## 2018-05-30 PROCEDURE — 99213 OFFICE O/P EST LOW 20 MIN: CPT | Performed by: NURSE PRACTITIONER

## 2018-05-30 RX ORDER — TRAMADOL HYDROCHLORIDE 50 MG/1
50 TABLET ORAL EVERY 6 HOURS PRN
Qty: 20 TABLET | Refills: 0 | Status: SHIPPED | OUTPATIENT
Start: 2018-05-30 | End: 2019-07-15

## 2018-05-30 NOTE — PROGRESS NOTES
"Subjective   Odessa Walker is a 63 y.o. female. Patient here today with complaints of Follow-up (3 months- wants some nasal spray for allergy season ) and Knee Pain (still hurts, and is been falling some )  Patient here today with complaints still of right knee pain, rates as 5-6 on the pain score.  She reports it is \"giving out\" on her at times.  She has had a total right knee in the past thinks was around the year 2000 in [place].  She has been taking over-the-counter Tylenol for pain which helps only minimally.  Right knee was not hurting at all until approximately 1 month ago when she was going up steps she felt like something \"came loose\".  She has had difficulty with ambulation and pain since.  She has a \"shooting pain \".  She was seen here last month with similar complaints however pain and swelling have worsened since then.    Vitals:    05/30/18 1307   BP: 116/62   Pulse: 78   SpO2: 98%     Past Medical History:   Diagnosis Date   • Benign essential hypertension    • Candidiasis of skin and nail     L foot   • Chest pain    • Chronic anemia    • Claudication    • Corns and callosities    • Depressive disorder    • Dermatitis    • Disease of nail     other specified   • Disorder of peripheral nervous system    • Displaced fracture of third metatarsal bone, right foot, initial encounter for closed fracture    • Displaced fracture of third metatarsal bone, right foot, subsequent encounter for fracture with routine healing    • Diverticular disease of colon    • Dog bite of hand    • Epigastric pain    • Essential hypertension    • Foot pain    • GERD (gastroesophageal reflux disease)    • H/O screening mammography    • Hallux valgus    • Heartburn    • Hyperlipidemia    • Joint pain    • Joint swelling    • Neurologic disorder associated with diabetes mellitus     type 2   • Nondisplaced fracture of fourth metatarsal bone, right foot, initial encounter for closed fracture    • Nondisplaced fracture of " fourth metatarsal bone, right foot, subsequent encounter for fracture with routine healing    • Pain in right leg    • Superficial laceration of hand    • Type 2 diabetes mellitus    • Urinary tract infectious disease    • Venous insufficiency (chronic) (peripheral)      Knee Pain    The incident occurred more than 1 week ago. Injury mechanism: Walking up steps. The pain is present in the right knee. The pain is at a severity of 6/10. The pain is moderate. The pain has been constant since onset. Associated symptoms include an inability to bear weight (She is able to bear weight however gait is guarded considerably) and a loss of motion. Pertinent negatives include no loss of sensation, muscle weakness, numbness or tingling. It is unknown if a foreign body is present. The symptoms are aggravated by movement, palpation and weight bearing. She has tried rest, NSAIDs, acetaminophen and non-weight bearing for the symptoms. The treatment provided mild relief.        The following portions of the patient's history were reviewed and updated as appropriate: allergies, current medications, past family history, past medical history, past social history, past surgical history and problem list.    Review of Systems   Constitutional: Negative.    HENT: Negative.    Eyes: Negative.    Respiratory: Negative.    Cardiovascular: Negative.    Gastrointestinal: Negative.    Endocrine: Negative.    Genitourinary: Negative.    Musculoskeletal: Positive for arthralgias and gait problem.   Skin: Negative.    Allergic/Immunologic: Negative.    Neurological: Negative for tingling and numbness.   Hematological: Negative.    Psychiatric/Behavioral: Negative.        Objective   Physical Exam   Constitutional: She is oriented to person, place, and time. She appears well-developed and well-nourished. No distress.   HENT:   Head: Normocephalic and atraumatic.   Cardiovascular: Normal rate, regular rhythm and normal heart sounds.  Exam reveals no  gallop and no friction rub.    No murmur heard.  Pulmonary/Chest: Effort normal and breath sounds normal. No respiratory distress. She has no wheezes. She has no rales.   Musculoskeletal: She exhibits tenderness.        Right knee: She exhibits decreased range of motion, swelling, deformity, abnormal alignment and bony tenderness. She exhibits no effusion, no ecchymosis, no laceration and no erythema. Tenderness found.        Legs:  Pt reports that she usually ambulates with assist of cane but left at home today, gait guarded but without assist today, R anterior knee with quarter-sized fluid-filled area which is tender to palpation, there is a palpable area of concern for displaced metal to this area as well, she is able to bear weight . No erythema noted    Neurological: She is alert and oriented to person, place, and time.   Skin: Skin is warm and dry. No rash noted. She is not diaphoretic. No erythema. No pallor.   Nursing note and vitals reviewed.      Assessment/Plan   Odessa was seen today for follow-up and knee pain.    Diagnoses and all orders for this visit:    Acute pain of right knee  -     Cancel: MRI Knee Right Without Contrast; Future    History of total right knee replacement  -     Cancel: MRI Knee Right Without Contrast; Future    Other orders  -     traMADol (ULTRAM) 50 MG tablet; Take 1 tablet by mouth Every 6 (Six) Hours As Needed for Moderate Pain .    tramadol given to her today, ashley is obtained and reviewed  XR R knee which was obtained at last visit is again reviewed, negative according to radiology  Will refer to ortho of choice for further eval and treatment as they deem nec.   She is aware and is in agreement to this plan  All questions and concerns are addressed with understanding noted.   ASHLEY query complete. Treatment plan to include limited course of prescribed controlled substance. Risks including addiction, benefits, and alternatives presented to patient.   Patient's Body mass  index is 21.97 kg/m². BMI is WNL.  MRI cancelled

## 2018-06-25 RX ORDER — GLIPIZIDE 10 MG/1
10 TABLET ORAL
Qty: 60 TABLET | Refills: 5 | Status: SHIPPED | OUTPATIENT
Start: 2018-06-25 | End: 2019-01-08 | Stop reason: SDUPTHER

## 2018-07-18 RX ORDER — DULAGLUTIDE 0.75 MG/.5ML
INJECTION, SOLUTION SUBCUTANEOUS
Qty: 2 ML | Refills: 2 | Status: SHIPPED | OUTPATIENT
Start: 2018-07-18 | End: 2018-11-20 | Stop reason: SDUPTHER

## 2018-07-25 ENCOUNTER — LAB (OUTPATIENT)
Dept: LAB | Facility: OTHER | Age: 63
End: 2018-07-25

## 2018-07-25 ENCOUNTER — OFFICE VISIT (OUTPATIENT)
Dept: ORTHOPEDIC SURGERY | Facility: CLINIC | Age: 63
End: 2018-07-25

## 2018-07-25 VITALS — WEIGHT: 136 LBS | BODY MASS INDEX: 22.66 KG/M2 | HEIGHT: 65 IN

## 2018-07-25 DIAGNOSIS — G89.29 CHRONIC PAIN OF RIGHT KNEE: ICD-10-CM

## 2018-07-25 DIAGNOSIS — M25.561 CHRONIC PAIN OF RIGHT KNEE: ICD-10-CM

## 2018-07-25 DIAGNOSIS — E11.9 TYPE 2 DIABETES MELLITUS WITHOUT COMPLICATION, WITHOUT LONG-TERM CURRENT USE OF INSULIN (HCC): ICD-10-CM

## 2018-07-25 DIAGNOSIS — T84.038A ASEPTIC LOOSENING OF PROSTHETIC KNEE, INITIAL ENCOUNTER (HCC): Primary | ICD-10-CM

## 2018-07-25 DIAGNOSIS — Z96.659 ASEPTIC LOOSENING OF PROSTHETIC KNEE, INITIAL ENCOUNTER (HCC): Primary | ICD-10-CM

## 2018-07-25 DIAGNOSIS — I10 ESSENTIAL HYPERTENSION: ICD-10-CM

## 2018-07-25 DIAGNOSIS — Z96.651 PRESENCE OF TOTAL RIGHT KNEE JOINT PROSTHESIS: ICD-10-CM

## 2018-07-25 LAB — HBA1C MFR BLD: 7.5 % (ref 4–5.6)

## 2018-07-25 PROCEDURE — 99205 OFFICE O/P NEW HI 60 MIN: CPT | Performed by: ORTHOPAEDIC SURGERY

## 2018-07-25 PROCEDURE — 83036 HEMOGLOBIN GLYCOSYLATED A1C: CPT | Performed by: ORTHOPAEDIC SURGERY

## 2018-07-25 PROCEDURE — 36415 COLL VENOUS BLD VENIPUNCTURE: CPT | Performed by: ORTHOPAEDIC SURGERY

## 2018-07-25 RX ORDER — OXYCODONE HCL 10 MG/1
10 TABLET, FILM COATED, EXTENDED RELEASE ORAL ONCE
Status: CANCELLED | OUTPATIENT
Start: 2018-07-25 | End: 2018-07-25

## 2018-07-25 RX ORDER — MELOXICAM 7.5 MG/1
15 TABLET ORAL ONCE
Status: CANCELLED | OUTPATIENT
Start: 2018-07-25 | End: 2018-07-25

## 2018-07-25 RX ORDER — ACETAMINOPHEN 325 MG/1
1000 TABLET ORAL ONCE
Status: CANCELLED | OUTPATIENT
Start: 2018-07-25 | End: 2018-07-25

## 2018-07-25 RX ORDER — PREGABALIN 75 MG/1
75 CAPSULE ORAL ONCE
Status: CANCELLED | OUTPATIENT
Start: 2018-07-25 | End: 2018-07-25

## 2018-07-25 NOTE — PROGRESS NOTES
Odessa Walker is a 63 y.o. female   Primary provider:  SIDDHARTH Jim       Chief Complaint   Patient presents with   • Right Knee - Knee Pain       HISTORY OF PRESENT ILLNESS: right knee pain started about 3 months ago after going up some steps. Patient states that she has weakness and has fallen couple of times. Right knee replacement done in 2000 in [place]. xrays done 4/11/2018. Patient using cane for assistance.   TKA in right knee about 20 years ago.  Was having no problems until about 3-4 months ago when she had an acute pain in right knee.  She developed a cystic lesion or mass on the anterior aspect of knee just below the patella  Still able to walk  Tender to touch.  She reports that her knee just gives out on her and she nearly falls.  No numbness or tingling  No fevers or chills.  The mass has gotten larger over the last 3 months - and more painful.    Knee Pain    There was no injury mechanism. The pain is present in the left knee. The quality of the pain is described as aching. The pain is moderate. The pain has been intermittent since onset. Associated symptoms comments: Swelling. . She reports no foreign bodies present. Exacerbated by: standing, walking.  She has tried ice and heat for the symptoms.        CONCURRENT MEDICAL HISTORY:    Past Medical History:   Diagnosis Date   • Benign essential hypertension    • Candidiasis of skin and nail     L foot   • Chest pain    • Chronic anemia    • Claudication (CMS/HCC)    • Corns and callosities    • Depressive disorder    • Dermatitis    • Disease of nail     other specified   • Disorder of peripheral nervous system    • Displaced fracture of third metatarsal bone, right foot, initial encounter for closed fracture    • Displaced fracture of third metatarsal bone, right foot, subsequent encounter for fracture with routine healing    • Diverticular disease of colon    • Dog bite of hand    • Epigastric pain    • Essential hypertension    • Foot  pain    • GERD (gastroesophageal reflux disease)    • H/O screening mammography    • Hallux valgus    • Heartburn    • Hyperlipidemia    • Joint pain    • Joint swelling    • Neurologic disorder associated with diabetes mellitus (CMS/HCC)     type 2   • Nondisplaced fracture of fourth metatarsal bone, right foot, initial encounter for closed fracture    • Nondisplaced fracture of fourth metatarsal bone, right foot, subsequent encounter for fracture with routine healing    • Pain in right leg    • Superficial laceration of hand    • Type 2 diabetes mellitus (CMS/HCC)    • Urinary tract infectious disease    • Venous insufficiency (chronic) (peripheral)        Allergies   Allergen Reactions   • Penicillins          Current Outpatient Prescriptions:   •  Calcium Carbonate (CALCIUM 600 PO), Take  by mouth., Disp: , Rfl:   •  glipiZIDE (GLUCOTROL) 10 MG tablet, TAKE 1 TABLET BY MOUTH 2 (TWO) TIMES A DAY BEFORE MEALS., Disp: 60 tablet, Rfl: 5  •  Glucosamine HCl 1000 MG tablet, Take  by mouth., Disp: , Rfl:   •  glucose blood (ONETOUCH VERIO) test strip, 1 each by Other route As Needed (diabetes). Use once daily for diabetes DX E11.9, Disp: 50 each, Rfl: 11  •  lisinopril (PRINIVIL,ZESTRIL) 40 MG tablet, TAKE 1/2 TABLET BY MOUTH DAILY, Disp: 15 tablet, Rfl: 5  •  metFORMIN (GLUCOPHAGE) 1000 MG tablet, TAKE 1 TABLET BY MOUTH 2 (TWO) TIMES A DAY WITH MEALS., Disp: 60 tablet, Rfl: 5  •  Multiple Vitamins-Minerals (ONE-A-DAY 50 PLUS PO), Take  by mouth., Disp: , Rfl:   •  omeprazole (priLOSEC) 40 MG capsule, TAKE 1 CAPSULE BY MOUTH DAILY, Disp: 30 capsule, Rfl: 5  •  ONE TOUCH LANCETS misc, 1 applicator Daily., Disp: 200 each, Rfl: 12  •  sertraline (ZOLOFT) 50 MG tablet, Take 1 tablet by mouth Daily., Disp: 60 tablet, Rfl: 5  •  simvastatin (ZOCOR) 40 MG tablet, Take 1 tablet by mouth Every Night., Disp: 30 tablet, Rfl: 5  •  sulindac (CLINORIL) 200 MG tablet, Take 1 tablet by mouth 2 (Two) Times a Day., Disp: 60 tablet,  "Rfl: 5  •  traMADol (ULTRAM) 50 MG tablet, Take 1 tablet by mouth Every 6 (Six) Hours As Needed for Moderate Pain ., Disp: 20 tablet, Rfl: 0  •  TRULICITY 0.75 MG/0.5ML solution pen-injector, INJECT 0.75MG SUB-Q ONCE A WEEK [REPLACES TANZEUM], Disp: 2 mL, Rfl: 2    Past Surgical History:   Procedure Laterality Date   • BACK SURGERY  2007   • COLONOSCOPY  11/11/2015    Diverticulosis found in the sigmoid colon. Hemorrhoids found in the anus.   • KNEE SURGERY  2005   • OTHER SURGICAL HISTORY  05/06/2015    DEBRIDE NAIL 6 OR MORE    • OTHER SURGICAL HISTORY  05/06/2015    PARING CORN/CALLUS    • OTHER SURGICAL HISTORY  10/25/2015    TREAT METATARSAL FRACTURE    • UPPER GASTROINTESTINAL ENDOSCOPY  11/11/2015    Esophagitis seen.Biopsy taken.A hiatus hernia was found in the esophagus.Gastritis found in the stomach.Biopsy taken   • US VENOUS EXTREMITY UNILATERAL  01/26/2016   • VAGINAL HYSTERECTOMY SALPINGO OOPHORECTOMY  1999       Family History   Problem Relation Age of Onset   • Heart disease Mother    • Osteoarthritis Father    • Diabetes Other        Social History     Social History   • Marital status:      Spouse name: N/A   • Number of children: N/A   • Years of education: N/A     Occupational History   • Not on file.     Social History Main Topics   • Smoking status: Former Smoker   • Smokeless tobacco: Never Used   • Alcohol use No   • Drug use: No   • Sexual activity: Defer     Other Topics Concern   • Not on file     Social History Narrative   • No narrative on file        Review of Systems   Constitutional: Negative for chills and fever.   HENT:        Dry eyes   Eyes: Positive for visual disturbance.   Respiratory: Negative.    Cardiovascular: Negative.    Endocrine:        Excessive thirst    Musculoskeletal: Positive for joint swelling.        Joint pain   All other systems reviewed and are negative.      PHYSICAL EXAMINATION:       Ht 165.1 cm (65\")   Wt 61.7 kg (136 lb)   BMI 22.63 kg/m² "     Physical Exam   Constitutional: She is oriented to person, place, and time. She appears well-developed and well-nourished. No distress.   Eyes: Pupils are equal, round, and reactive to light. Conjunctivae are normal.   Neck: Neck supple. No tracheal deviation present. No thyromegaly present.   Cardiovascular: Normal rate, regular rhythm, normal heart sounds and intact distal pulses.    Pulmonary/Chest: Effort normal and breath sounds normal. She exhibits no tenderness.   Abdominal: Soft. Bowel sounds are normal. She exhibits no distension and no mass. There is no tenderness.   Musculoskeletal:        Right knee: She exhibits no effusion.        Left knee: She exhibits no effusion.   Neurological: She is alert and oriented to person, place, and time.   Skin: Skin is warm. No rash noted.   Psychiatric: She has a normal mood and affect. Her behavior is normal. Judgment and thought content normal.   Vitals reviewed.      GAIT:     []  Normal  [x]  Antalgic    Assistive device: []  None  []  Walker     []  Crutches  [x]  Cane     []  Wheelchair  []  Stretcher    Right Knee Exam     Tenderness   Right knee tenderness location: tender along the inferior aspect of patella especially overlying the 3x4cm mass on anterior aspect of knee.    Range of Motion   Extension: -5   Flexion: 120     Tests   Varus: positive  Valgus: positive    Other   Erythema: absent  Scars: present  Sensation: normal  Pulse: present  Swelling: mild  Other tests: no effusion present      Left Knee Exam     Tenderness   The patient is experiencing no tenderness.         Range of Motion   Extension: 0   Flexion: 120     Muscle Strength     The patient has normal left knee strength.    Tests   Drawer:       Anterior - negative       Varus: negative  Valgus: negative    Other   Erythema: absent  Sensation: normal  Pulse: present  Swelling: none  Effusion: no effusion present              Right knee three view on 4/11/2018     CLINICAL INDICATION: Knee  pain after walking up stairs     COMPARISON: None     FINDINGS: The patient is status post a total knee arthroplasty.  Chronic calcifications are noted in the soft tissues around the  right knee. No definite joint effusion is noted. There is a large  bony exostosis off of the posterior aspect of the proximal tibia.  No definite hardware complication is noted. There are no  fractures.     IMPRESSION:  No acute abnormality.     Electronically signed by:  Jesse Land  4/11/2018 10:05 PM  CDT Workstation: [id number]      Presence of TKA with very poor alignment, probably shifting, narrowing of the poly piece, and bony osteopenia.  07/25/18 at 12:49 PM by Pradeep Jacobs MD                ASSESSMENT:    Diagnoses and all orders for this visit:    Aseptic loosening of prosthetic knee, initial encounter (CMS/[facility])  -     Case Request; Standing  -     MRSA Screen, PCR - Swab, Nares; Future  -     Type and screen; Future  -     Tranexamic Acid 1,000 mg in sodium chloride 0.9 % 100 mL; Infuse 1,000 mg into a venous catheter 1 (One) Time.  -     Tranexamic Acid 1,000 mg in sodium chloride 0.9 % 100 mL; Infuse 1,000 mg into a venous catheter 1 (One) Time.  -     clindamycin (CLEOCIN) 900 mg in dextrose (D5W) 5 % 100 mL IVPB; Infuse 900 mg into a venous catheter 1 (One) Time.  -     acetaminophen (TYLENOL) tablet 975 mg; Take 3 tablets by mouth 1 (One) Time.  -     meloxicam (MOBIC) tablet 15 mg; Take 2 tablets by mouth 1 (One) Time.  -     pregabalin (LYRICA) capsule 75 mg; Take 1 capsule by mouth 1 (One) Time.  -     oxyCODONE (oxyCONTIN) 12 hr tablet 10 mg; Take 1 tablet by mouth 1 (One) Time.  -     Case Request    Chronic pain of right knee  -     Case Request; Standing  -     MRSA Screen, PCR - Swab, Nares; Future  -     Type and screen; Future  -     Tranexamic Acid 1,000 mg in sodium chloride 0.9 % 100 mL; Infuse 1,000 mg into a venous catheter 1 (One) Time.  -     Tranexamic Acid 1,000 mg in sodium  chloride 0.9 % 100 mL; Infuse 1,000 mg into a venous catheter 1 (One) Time.  -     clindamycin (CLEOCIN) 900 mg in dextrose (D5W) 5 % 100 mL IVPB; Infuse 900 mg into a venous catheter 1 (One) Time.  -     acetaminophen (TYLENOL) tablet 975 mg; Take 3 tablets by mouth 1 (One) Time.  -     meloxicam (MOBIC) tablet 15 mg; Take 2 tablets by mouth 1 (One) Time.  -     pregabalin (LYRICA) capsule 75 mg; Take 1 capsule by mouth 1 (One) Time.  -     oxyCODONE (oxyCONTIN) 12 hr tablet 10 mg; Take 1 tablet by mouth 1 (One) Time.  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  -     Tranexamic Acid 1,000 mg in sodium chloride 0.9 % 100 mL; Infuse 1,000 mg into a venous catheter 1 (One) Time.  -     clindamycin (CLEOCIN) 900 mg in dextrose (D5W) 5 % 100 mL IVPB; Infuse 900 mg into a venous catheter 1 (One) Time.  -     acetaminophen (TYLENOL) tablet 975 mg; Take 3 tablets by mouth 1 (One) Time.  -     meloxicam (MOBIC) tablet 15 mg; Take 2 tablets by mouth 1 (One) Time.  -     pregabalin (LYRICA) capsule 75 mg; Take 1 capsule by mouth 1 (One) Time.  -     oxyCODONE (oxyCONTIN) 12 hr tablet 10 mg; Take 1 tablet by mouth 1 (One) Time.  -     Case Request    Essential hypertension  -     Case Request; Standing  -     MRSA Screen, PCR - Swab, Nares; Future  -     Type and screen; Future  -     Tranexamic Acid 1,000 mg in sodium chloride 0.9 % 100 mL; Infuse 1,000 mg into a venous catheter 1 (One) Time.  -     Tranexamic Acid 1,000 mg in sodium chloride 0.9 % 100 mL; Infuse 1,000 mg into a venous catheter 1 (One) Time.  -     clindamycin (CLEOCIN) 900 mg in dextrose (D5W) 5 % 100 mL IVPB; Infuse 900 mg into a venous catheter 1 (One) Time.  -     acetaminophen (TYLENOL) tablet 975 mg; Take 3  tablets by mouth 1 (One) Time.  -     meloxicam (MOBIC) tablet 15 mg; Take 2 tablets by mouth 1 (One) Time.  -     pregabalin (LYRICA) capsule 75 mg; Take 1 capsule by mouth 1 (One) Time.  -     oxyCODONE (oxyCONTIN) 12 hr tablet 10 mg; Take 1 tablet by mouth 1 (One) Time.  -     Case Request    Type 2 diabetes mellitus without complication, without long-term current use of insulin (CMS/[facility])  -     Hemoglobin A1c; Future  -     Case Request; Standing  -     MRSA Screen, PCR - Swab, Nares; Future  -     Type and screen; Future  -     Tranexamic Acid 1,000 mg in sodium chloride 0.9 % 100 mL; Infuse 1,000 mg into a venous catheter 1 (One) Time.  -     Tranexamic Acid 1,000 mg in sodium chloride 0.9 % 100 mL; Infuse 1,000 mg into a venous catheter 1 (One) Time.  -     clindamycin (CLEOCIN) 900 mg in dextrose (D5W) 5 % 100 mL IVPB; Infuse 900 mg into a venous catheter 1 (One) Time.  -     acetaminophen (TYLENOL) tablet 975 mg; Take 3 tablets by mouth 1 (One) Time.  -     meloxicam (MOBIC) tablet 15 mg; Take 2 tablets by mouth 1 (One) Time.  -     pregabalin (LYRICA) capsule 75 mg; Take 1 capsule by mouth 1 (One) Time.  -     oxyCODONE (oxyCONTIN) 12 hr tablet 10 mg; Take 1 tablet by mouth 1 (One) Time.  -     Case Request    Other orders  -     Outpatient In A Bed; Standing  -     Follow Anesthesia Guidelines / Standing Orders; Future  -     Provide instructions to patient regarding NPO status  -     Follow Anesthesia Guidelines / Standing Orders; Standing  -     Verify NPO Status; Standing  -     POC Glucose Once; Standing  -     Clip operative site; Standing  -     Obtain informed consent (if not collected inpatient or PAT); Standing  -     NPO After Midnight  -     Nerve Block; Standing          PLAN    The patient voiced understanding of the risks, benefits, and alternative forms of treatment that were discussed and the patient consents to proceed with surgery.  All risks, benefits and alternatives were discussed.   Risks including to but not exclusive to anesthetic complications, including death, MI, CVA, infection, bleeding DVT, fracture, residual pain and need for future surgery.  This discussion was held with the patient by Pradeep Jacobs MD and all questions were answered.    Plan excision of mass on anterior knee - which I think is probably a granuloma of the polyethylene due to the loosening.  Therefore, I anticipate that we will have to proceed with a full revision with stems and possible wedges to restore joint line height.     Patient is as high risk due to the severity of the surgery, DM type 2, and hypertension.  She also reports some venous insufficiency.   No personal history of prior DVT.    She understands this is a big ordeal but sincerely wants to walk and mobilize better and is deathly afraid of falling and breaking something because her knee isnt working correctly.        Patient's Body mass index is 22.63 kg/m². BMI is within normal parameters. No follow-up required.      Return for Post-operative eval.    Pradeep Jacobs MD

## 2018-07-26 ENCOUNTER — RESULTS ENCOUNTER (OUTPATIENT)
Dept: ORTHOPEDIC SURGERY | Facility: CLINIC | Age: 63
End: 2018-07-26

## 2018-07-26 DIAGNOSIS — G89.29 CHRONIC PAIN OF RIGHT KNEE: ICD-10-CM

## 2018-07-26 DIAGNOSIS — Z96.659 ASEPTIC LOOSENING OF PROSTHETIC KNEE, INITIAL ENCOUNTER (HCC): ICD-10-CM

## 2018-07-26 DIAGNOSIS — M25.561 CHRONIC PAIN OF RIGHT KNEE: ICD-10-CM

## 2018-07-26 DIAGNOSIS — I10 ESSENTIAL HYPERTENSION: ICD-10-CM

## 2018-07-26 DIAGNOSIS — Z96.651 PRESENCE OF TOTAL RIGHT KNEE JOINT PROSTHESIS: ICD-10-CM

## 2018-07-26 DIAGNOSIS — T84.038A ASEPTIC LOOSENING OF PROSTHETIC KNEE, INITIAL ENCOUNTER (HCC): ICD-10-CM

## 2018-07-26 DIAGNOSIS — E11.9 TYPE 2 DIABETES MELLITUS WITHOUT COMPLICATION, WITHOUT LONG-TERM CURRENT USE OF INSULIN (HCC): ICD-10-CM

## 2018-08-06 ENCOUNTER — TELEPHONE (OUTPATIENT)
Dept: ORTHOPEDIC SURGERY | Facility: CLINIC | Age: 63
End: 2018-08-06

## 2018-08-07 NOTE — TELEPHONE ENCOUNTER
So we should be good to go.  im guessing you already have her schedule and arranged but it not, you may do so.  Thanks  KYLE

## 2018-08-27 RX ORDER — SIMVASTATIN 40 MG
TABLET ORAL
Qty: 30 TABLET | Refills: 5 | Status: SHIPPED | OUTPATIENT
Start: 2018-08-27 | End: 2019-03-11 | Stop reason: SDUPTHER

## 2018-08-29 DIAGNOSIS — E11.9 TYPE 2 DIABETES MELLITUS WITHOUT COMPLICATION, UNSPECIFIED LONG TERM INSULIN USE STATUS: ICD-10-CM

## 2018-08-29 DIAGNOSIS — Z13.29 SCREENING FOR THYROID DISORDER: ICD-10-CM

## 2018-08-29 DIAGNOSIS — I15.9 SECONDARY HYPERTENSION: Primary | ICD-10-CM

## 2018-08-30 ENCOUNTER — LAB (OUTPATIENT)
Dept: LAB | Facility: OTHER | Age: 63
End: 2018-08-30

## 2018-08-30 DIAGNOSIS — E11.9 TYPE 2 DIABETES MELLITUS WITHOUT COMPLICATION, UNSPECIFIED LONG TERM INSULIN USE STATUS: ICD-10-CM

## 2018-08-30 DIAGNOSIS — Z13.29 SCREENING FOR THYROID DISORDER: ICD-10-CM

## 2018-08-30 DIAGNOSIS — R79.89 ABNORMAL CBC: ICD-10-CM

## 2018-08-30 DIAGNOSIS — R79.89 ABNORMAL CBC: Primary | ICD-10-CM

## 2018-08-30 DIAGNOSIS — I15.9 SECONDARY HYPERTENSION: ICD-10-CM

## 2018-08-30 LAB
ALBUMIN SERPL-MCNC: 4.4 G/DL (ref 3.5–5)
ALBUMIN/GLOB SERPL: 1.5 G/DL (ref 1.1–1.8)
ALP SERPL-CCNC: 45 U/L (ref 38–126)
ALT SERPL W P-5'-P-CCNC: 17 U/L
ANION GAP SERPL CALCULATED.3IONS-SCNC: 9 MMOL/L (ref 5–15)
AST SERPL-CCNC: 23 U/L (ref 14–36)
BASOPHILS # BLD AUTO: 0.04 10*3/MM3 (ref 0–0.2)
BASOPHILS NFR BLD AUTO: 0.6 % (ref 0–2)
BILIRUB SERPL-MCNC: 0.5 MG/DL (ref 0.2–1.3)
BUN BLD-MCNC: 19 MG/DL (ref 7–17)
BUN/CREAT SERPL: 16.1 (ref 7–25)
CALCIUM SPEC-SCNC: 9.8 MG/DL (ref 8.4–10.2)
CHLORIDE SERPL-SCNC: 112 MMOL/L (ref 98–107)
CHOLEST SERPL-MCNC: 128 MG/DL (ref 150–200)
CO2 SERPL-SCNC: 24 MMOL/L (ref 22–30)
CREAT BLD-MCNC: 1.18 MG/DL (ref 0.52–1.04)
DEPRECATED RDW RBC AUTO: 44.5 FL (ref 36.4–46.3)
EOSINOPHIL # BLD AUTO: 0.1 10*3/MM3 (ref 0–0.7)
EOSINOPHIL NFR BLD AUTO: 1.5 % (ref 0–7)
ERYTHROCYTE [DISTWIDTH] IN BLOOD BY AUTOMATED COUNT: 13.4 % (ref 11.5–14.5)
FERRITIN SERPL-MCNC: 10.5 NG/ML (ref 11.1–264)
FOLATE SERPL-MCNC: >20 NG/ML (ref 2.76–21)
GFR SERPL CREATININE-BSD FRML MDRD: 46 ML/MIN/1.73 (ref 45–104)
GLOBULIN UR ELPH-MCNC: 2.9 GM/DL (ref 2.3–3.5)
GLUCOSE BLD-MCNC: 129 MG/DL (ref 74–99)
HBA1C MFR BLD: 7.3 % (ref 4–5.6)
HCT VFR BLD AUTO: 34.4 % (ref 35–45)
HDLC SERPL-MCNC: 60 MG/DL (ref 40–59)
HGB BLD-MCNC: 11.1 G/DL (ref 12–15.5)
IRON 24H UR-MRATE: 70 MCG/DL (ref 37–170)
IRON SATN MFR SERPL: 16 % (ref 15–50)
LDLC SERPL CALC-MCNC: 51 MG/DL
LDLC/HDLC SERPL: 0.84 {RATIO} (ref 0–3.22)
LYMPHOCYTES # BLD AUTO: 2.13 10*3/MM3 (ref 0.6–4.2)
LYMPHOCYTES NFR BLD AUTO: 32.3 % (ref 10–50)
MCH RBC QN AUTO: 30.2 PG (ref 26.5–34)
MCHC RBC AUTO-ENTMCNC: 32.3 G/DL (ref 31.4–36)
MCV RBC AUTO: 93.7 FL (ref 80–98)
MONOCYTES # BLD AUTO: 0.48 10*3/MM3 (ref 0–0.9)
MONOCYTES NFR BLD AUTO: 7.3 % (ref 0–12)
NEUTROPHILS # BLD AUTO: 3.84 10*3/MM3 (ref 2–8.6)
NEUTROPHILS NFR BLD AUTO: 58.3 % (ref 37–80)
PLATELET # BLD AUTO: 220 10*3/MM3 (ref 150–450)
PMV BLD AUTO: 9.3 FL (ref 8–12)
POTASSIUM BLD-SCNC: 5 MMOL/L (ref 3.4–5)
PROT SERPL-MCNC: 7.3 G/DL (ref 6.3–8.2)
RBC # BLD AUTO: 3.67 10*6/MM3 (ref 3.77–5.16)
SODIUM BLD-SCNC: 145 MMOL/L (ref 137–145)
T4 FREE SERPL-MCNC: 1.19 NG/DL (ref 0.78–2.19)
TIBC SERPL-MCNC: 427 MCG/DL (ref 265–497)
TRIGL SERPL-MCNC: 87 MG/DL
TSH SERPL DL<=0.05 MIU/L-ACNC: 2.15 MIU/ML (ref 0.46–4.68)
VLDLC SERPL-MCNC: 17.4 MG/DL
WBC NRBC COR # BLD: 6.59 10*3/MM3 (ref 3.2–9.8)

## 2018-08-30 PROCEDURE — 82746 ASSAY OF FOLIC ACID SERUM: CPT | Performed by: NURSE PRACTITIONER

## 2018-08-30 PROCEDURE — 83550 IRON BINDING TEST: CPT | Performed by: NURSE PRACTITIONER

## 2018-08-30 PROCEDURE — 80061 LIPID PANEL: CPT | Performed by: NURSE PRACTITIONER

## 2018-08-30 PROCEDURE — 36415 COLL VENOUS BLD VENIPUNCTURE: CPT | Performed by: NURSE PRACTITIONER

## 2018-08-30 PROCEDURE — 85025 COMPLETE CBC W/AUTO DIFF WBC: CPT | Performed by: NURSE PRACTITIONER

## 2018-08-30 PROCEDURE — 84443 ASSAY THYROID STIM HORMONE: CPT | Performed by: NURSE PRACTITIONER

## 2018-08-30 PROCEDURE — 83036 HEMOGLOBIN GLYCOSYLATED A1C: CPT | Performed by: NURSE PRACTITIONER

## 2018-08-30 PROCEDURE — 84439 ASSAY OF FREE THYROXINE: CPT | Performed by: NURSE PRACTITIONER

## 2018-08-30 PROCEDURE — 82728 ASSAY OF FERRITIN: CPT | Performed by: NURSE PRACTITIONER

## 2018-08-30 PROCEDURE — 83540 ASSAY OF IRON: CPT | Performed by: NURSE PRACTITIONER

## 2018-08-30 PROCEDURE — 84481 FREE ASSAY (FT-3): CPT | Performed by: NURSE PRACTITIONER

## 2018-08-30 PROCEDURE — 80053 COMPREHEN METABOLIC PANEL: CPT | Performed by: NURSE PRACTITIONER

## 2018-09-01 LAB — T3FREE SERPL-MCNC: 2.7 PG/ML (ref 2–4.4)

## 2018-09-04 ENCOUNTER — OFFICE VISIT (OUTPATIENT)
Dept: FAMILY MEDICINE CLINIC | Facility: CLINIC | Age: 63
End: 2018-09-04

## 2018-09-04 VITALS
OXYGEN SATURATION: 98 % | WEIGHT: 136 LBS | TEMPERATURE: 97 F | BODY MASS INDEX: 22.66 KG/M2 | HEIGHT: 65 IN | DIASTOLIC BLOOD PRESSURE: 68 MMHG | HEART RATE: 87 BPM | SYSTOLIC BLOOD PRESSURE: 114 MMHG

## 2018-09-04 DIAGNOSIS — E78.5 HYPERLIPIDEMIA, UNSPECIFIED HYPERLIPIDEMIA TYPE: Chronic | ICD-10-CM

## 2018-09-04 DIAGNOSIS — D50.9 IRON DEFICIENCY ANEMIA, UNSPECIFIED IRON DEFICIENCY ANEMIA TYPE: ICD-10-CM

## 2018-09-04 DIAGNOSIS — K21.9 GASTROESOPHAGEAL REFLUX DISEASE WITHOUT ESOPHAGITIS: Chronic | ICD-10-CM

## 2018-09-04 DIAGNOSIS — G64 DISORDER OF PERIPHERAL NERVOUS SYSTEM: Chronic | ICD-10-CM

## 2018-09-04 DIAGNOSIS — I10 ESSENTIAL HYPERTENSION: Primary | Chronic | ICD-10-CM

## 2018-09-04 DIAGNOSIS — E11.69 TYPE 2 DIABETES MELLITUS WITH OTHER SPECIFIED COMPLICATION, WITHOUT LONG-TERM CURRENT USE OF INSULIN (HCC): Chronic | ICD-10-CM

## 2018-09-04 PROCEDURE — 99214 OFFICE O/P EST MOD 30 MIN: CPT | Performed by: NURSE PRACTITIONER

## 2018-09-04 NOTE — PROGRESS NOTES
Subjective   Odessa Walker is a 63 y.o. female. Patient here today with complaints of Follow-up (on labs )  pt here today for recheck of htn, diabetes, hyperlipidemia, GERD, disorder of peripheral nervous system and anxiety, depression. Does not need refills on any meds today. Med list is reviewed. Denies side effects of meds, denies complaints today. Cont to see ortho, dr benavides, using walker with ambulation now. Reports fsbs running 100-175 and on average 120s. Had labs recently, here for those results. Reports diabetic eye exam UTD. Does not remember who performed exam however, states was in [place].     Vitals:    09/04/18 1308   BP: 114/68   Pulse: 87   Temp: 97 °F (36.1 °C)   SpO2: 98%     Past Medical History:   Diagnosis Date   • Benign essential hypertension    • Candidiasis of skin and nail     L foot   • Chest pain    • Chronic anemia    • Claudication (CMS/HCC)    • Corns and callosities    • Depressive disorder    • Dermatitis    • Disease of nail     other specified   • Disorder of peripheral nervous system    • Displaced fracture of third metatarsal bone, right foot, initial encounter for closed fracture    • Displaced fracture of third metatarsal bone, right foot, subsequent encounter for fracture with routine healing    • Diverticular disease of colon    • Dog bite of hand    • Epigastric pain    • Essential hypertension    • Foot pain    • GERD (gastroesophageal reflux disease)    • H/O screening mammography    • Hallux valgus    • Heartburn    • Hyperlipidemia    • Joint pain    • Joint swelling    • Neurologic disorder associated with diabetes mellitus (CMS/HCC)     type 2   • Nondisplaced fracture of fourth metatarsal bone, right foot, initial encounter for closed fracture    • Nondisplaced fracture of fourth metatarsal bone, right foot, subsequent encounter for fracture with routine healing    • Pain in right leg    • Superficial laceration of hand    • Type 2 diabetes mellitus (CMS/HCC)     • Urinary tract infectious disease    • Venous insufficiency (chronic) (peripheral)      Hypertension   This is a chronic problem. The current episode started more than 1 year ago. The problem is unchanged. The problem is controlled. Pertinent negatives include no blurred vision, chest pain, headaches, neck pain, orthopnea, peripheral edema, PND, shortness of breath or sweats. There are no associated agents to hypertension. Risk factors for coronary artery disease include post-menopausal state, dyslipidemia and diabetes mellitus. Past treatments include lifestyle changes and ACE inhibitors. Current antihypertension treatment includes lifestyle changes and ACE inhibitors. The current treatment provides moderate improvement. Compliance problems include exercise and diet.  There is no history of chronic renal disease.   Hyperlipidemia   This is a chronic problem. The current episode started more than 1 year ago. The problem is controlled. Exacerbating diseases include diabetes. She has no history of chronic renal disease, hypothyroidism, liver disease, obesity or nephrotic syndrome. Factors aggravating her hyperlipidemia include fatty foods. Pertinent negatives include no chest pain, focal sensory loss, focal weakness, leg pain, myalgias or shortness of breath. Current antihyperlipidemic treatment includes statins, diet change and exercise. The current treatment provides moderate improvement of lipids. Compliance problems include adherence to exercise, adherence to diet and psychosocial issues.  Risk factors for coronary artery disease include hypertension, dyslipidemia, diabetes mellitus and post-menopausal.   Diabetes   She presents for her follow-up diabetic visit. She has type 2 diabetes mellitus. Her disease course has been stable. There are no hypoglycemic associated symptoms. Pertinent negatives for hypoglycemia include no headaches or sweats. There are no diabetic associated symptoms. Pertinent negatives for  diabetes include no blurred vision and no chest pain. There are no hypoglycemic complications. Symptoms are stable. Diabetic complications include peripheral neuropathy. Risk factors for coronary artery disease include dyslipidemia, diabetes mellitus, hypertension and post-menopausal. Current diabetic treatment includes oral agent (dual therapy) and diet (injections, trulicity ). She is compliant with treatment all of the time. Her weight is stable. She is following a generally healthy diet. Her home blood glucose trend is decreasing steadily. Her breakfast blood glucose range is generally 110-130 mg/dl. An ACE inhibitor/angiotensin II receptor blocker is being taken. She does not see a podiatrist.Eye exam is current.        The following portions of the patient's history were reviewed and updated as appropriate: allergies, current medications, past family history, past medical history, past social history, past surgical history and problem list.    Review of Systems   Constitutional: Negative.    HENT: Negative.    Eyes: Negative.  Negative for blurred vision.   Respiratory: Negative.  Negative for shortness of breath.    Cardiovascular: Negative.  Negative for chest pain, orthopnea and PND.   Gastrointestinal: Negative.    Endocrine: Negative.    Genitourinary: Negative.    Musculoskeletal: Negative.  Negative for myalgias and neck pain.   Skin: Negative.    Allergic/Immunologic: Negative.    Neurological: Negative.  Negative for focal weakness and headaches.   Hematological: Negative.    Psychiatric/Behavioral: Negative.        Objective   Physical Exam   Constitutional: She is oriented to person, place, and time. She appears well-developed and well-nourished. No distress.   HENT:   Head: Normocephalic and atraumatic.   Neck: Normal carotid pulses present. Carotid bruit is not present.   Cardiovascular: Normal rate, regular rhythm and normal heart sounds.  Exam reveals no gallop and no friction rub.    No murmur  heard.  Pulmonary/Chest: Effort normal and breath sounds normal. No respiratory distress. She has no wheezes. She has no rales.   Abdominal: Soft. Bowel sounds are normal. She exhibits no distension and no mass. There is no tenderness. There is no rebound and no guarding. No hernia.   Musculoskeletal: She exhibits no edema (pt has on knee-high JACKI hose bilat ).   Neurological: She is alert and oriented to person, place, and time.   Skin: Skin is warm and dry. No rash noted. She is not diaphoretic. No erythema. No pallor.   Psychiatric: She has a normal mood and affect. Her behavior is normal.   Nursing note and vitals reviewed.      Assessment/Plan   Odessa was seen today for follow-up.    Diagnoses and all orders for this visit:    Essential hypertension    Hyperlipidemia, unspecified hyperlipidemia type    Type 2 diabetes mellitus with other specified complication, without long-term current use of insulin (CMS/[facility])    Gastroesophageal reflux disease without esophagitis    Disorder of peripheral nervous system    Iron deficiency anemia, unspecified iron deficiency anemia type    Labs are reviewed at today's visit, copies give to pt.   No refills needed today  Advised she check multi vitamin she takes currently, if it does not have iron in it, advised she start ferrous sulfate 1 po qd and repeat iron studies in 3 months  She is aware and is in agreement to this plan  All questions and concerns are addressed with understanding noted.   Follow up here in 6 months, sooner with problems

## 2018-09-07 ENCOUNTER — TELEPHONE (OUTPATIENT)
Dept: ORTHOPEDIC SURGERY | Facility: CLINIC | Age: 63
End: 2018-09-07

## 2018-09-07 NOTE — TELEPHONE ENCOUNTER
I have called Odessa on several occasions and left messages for her to call back.  Her surgery has been approved.    Waiting on her to call back to set up a date.

## 2018-10-31 RX ORDER — SULINDAC 200 MG/1
TABLET ORAL
Qty: 60 TABLET | Refills: 5 | Status: SHIPPED | OUTPATIENT
Start: 2018-10-31 | End: 2019-07-15

## 2018-11-20 RX ORDER — DULAGLUTIDE 0.75 MG/.5ML
INJECTION, SOLUTION SUBCUTANEOUS
Qty: 2 ML | Refills: 2 | Status: SHIPPED | OUTPATIENT
Start: 2018-11-20 | End: 2019-02-15 | Stop reason: SDUPTHER

## 2018-11-26 RX ORDER — OMEPRAZOLE 40 MG/1
40 CAPSULE, DELAYED RELEASE ORAL DAILY
Qty: 30 CAPSULE | Refills: 5 | Status: SHIPPED | OUTPATIENT
Start: 2018-11-26 | End: 2019-05-25 | Stop reason: SDUPTHER

## 2018-12-06 RX ORDER — LISINOPRIL 40 MG/1
TABLET ORAL
Qty: 15 TABLET | Refills: 5 | Status: SHIPPED | OUTPATIENT
Start: 2018-12-06 | End: 2019-05-28 | Stop reason: SDUPTHER

## 2019-01-08 RX ORDER — GLIPIZIDE 10 MG/1
TABLET ORAL
Qty: 60 TABLET | Refills: 5 | Status: SHIPPED | OUTPATIENT
Start: 2019-01-08 | End: 2019-07-15

## 2019-01-09 RX ORDER — GLIPIZIDE 10 MG/1
TABLET ORAL
Qty: 60 TABLET | Refills: 5 | Status: SHIPPED | OUTPATIENT
Start: 2019-01-09 | End: 2019-03-11 | Stop reason: SDUPTHER

## 2019-01-24 ENCOUNTER — TELEPHONE (OUTPATIENT)
Dept: FAMILY MEDICINE CLINIC | Facility: CLINIC | Age: 64
End: 2019-01-24

## 2019-01-24 RX ORDER — LANCETS 30 GAUGE
EACH MISCELLANEOUS
Qty: 100 EACH | Refills: 12 | Status: SHIPPED | OUTPATIENT
Start: 2019-01-24

## 2019-01-24 RX ORDER — BLOOD-GLUCOSE METER
1 KIT MISCELLANEOUS DAILY
Qty: 1 EACH | Refills: 0 | Status: SHIPPED | OUTPATIENT
Start: 2019-01-24

## 2019-01-31 NOTE — TELEPHONE ENCOUNTER
Patient has been informed to come in and get a sample that has a discount card in it.  She also was told that if that does not help we will change the medication.

## 2019-02-15 RX ORDER — DULAGLUTIDE 0.75 MG/.5ML
INJECTION, SOLUTION SUBCUTANEOUS
Qty: 2 ML | Refills: 2 | Status: SHIPPED | OUTPATIENT
Start: 2019-02-15 | End: 2019-03-11 | Stop reason: CLARIF

## 2019-02-20 ENCOUNTER — TELEPHONE (OUTPATIENT)
Dept: FAMILY MEDICINE CLINIC | Facility: CLINIC | Age: 64
End: 2019-02-20

## 2019-03-05 DIAGNOSIS — I10 ESSENTIAL HYPERTENSION: ICD-10-CM

## 2019-03-05 DIAGNOSIS — E11.8 TYPE 2 DIABETES MELLITUS WITH COMPLICATION, WITHOUT LONG-TERM CURRENT USE OF INSULIN (HCC): ICD-10-CM

## 2019-03-05 DIAGNOSIS — E78.5 HYPERLIPIDEMIA, UNSPECIFIED HYPERLIPIDEMIA TYPE: ICD-10-CM

## 2019-03-05 DIAGNOSIS — E61.1 LOW IRON: Primary | ICD-10-CM

## 2019-03-11 ENCOUNTER — OFFICE VISIT (OUTPATIENT)
Dept: FAMILY MEDICINE CLINIC | Facility: CLINIC | Age: 64
End: 2019-03-11

## 2019-03-11 VITALS
SYSTOLIC BLOOD PRESSURE: 112 MMHG | BODY MASS INDEX: 21.99 KG/M2 | WEIGHT: 132 LBS | HEART RATE: 76 BPM | HEIGHT: 65 IN | TEMPERATURE: 99.4 F | DIASTOLIC BLOOD PRESSURE: 62 MMHG

## 2019-03-11 DIAGNOSIS — F32.A DEPRESSION, UNSPECIFIED DEPRESSION TYPE: Chronic | ICD-10-CM

## 2019-03-11 DIAGNOSIS — F41.9 ANXIETY: Chronic | ICD-10-CM

## 2019-03-11 DIAGNOSIS — E78.5 HYPERLIPIDEMIA, UNSPECIFIED HYPERLIPIDEMIA TYPE: Primary | Chronic | ICD-10-CM

## 2019-03-11 DIAGNOSIS — M25.561 PAIN IN BOTH KNEES, UNSPECIFIED CHRONICITY: ICD-10-CM

## 2019-03-11 DIAGNOSIS — E11.69 TYPE 2 DIABETES MELLITUS WITH OTHER SPECIFIED COMPLICATION, WITHOUT LONG-TERM CURRENT USE OF INSULIN (HCC): Chronic | ICD-10-CM

## 2019-03-11 DIAGNOSIS — I10 ESSENTIAL HYPERTENSION: Chronic | ICD-10-CM

## 2019-03-11 DIAGNOSIS — M25.562 PAIN IN BOTH KNEES, UNSPECIFIED CHRONICITY: ICD-10-CM

## 2019-03-11 DIAGNOSIS — G64 DISORDER OF PERIPHERAL NERVOUS SYSTEM: Chronic | ICD-10-CM

## 2019-03-11 DIAGNOSIS — Z96.651 PRESENCE OF TOTAL RIGHT KNEE JOINT PROSTHESIS: ICD-10-CM

## 2019-03-11 PROCEDURE — 99214 OFFICE O/P EST MOD 30 MIN: CPT | Performed by: NURSE PRACTITIONER

## 2019-03-11 RX ORDER — SIMVASTATIN 40 MG
40 TABLET ORAL
Qty: 30 TABLET | Refills: 5 | Status: SHIPPED | OUTPATIENT
Start: 2019-03-11 | End: 2019-11-12 | Stop reason: SDUPTHER

## 2019-03-11 NOTE — PROGRESS NOTES
"Subjective   Odessa Walker is a 63 y.o. female. Patient here today with complaints of Follow-up and Diabetes  Patient here today for recheck of diabetes mellitus, GERD, hypertension, hyperlipidemia and depression, needing refills on some medications, was unable to obtain Trulicity due to cost and it not being covered on her insurance, states blood sugars running  and blood pressures at home with \"good readings \".  She does bring copies of her readings in with her and systolic blood pressure is ranging from 112-142 and diastolic ranging from 50-74.  Denies chest pain shortness breath headache.  Reports both knees are still hurting she is wearing braces, has had total knee on right and has not seen orthopedic recently.    Vitals:    03/11/19 1310   BP: 112/62   Pulse: 76   Temp: 99.4 °F (37.4 °C)     Past Medical History:   Diagnosis Date   • Benign essential hypertension    • Candidiasis of skin and nail     L foot   • Chest pain    • Chronic anemia    • Claudication (CMS/HCC)    • Corns and callosities    • Depressive disorder    • Dermatitis    • Disease of nail     other specified   • Disorder of peripheral nervous system    • Displaced fracture of third metatarsal bone, right foot, initial encounter for closed fracture    • Displaced fracture of third metatarsal bone, right foot, subsequent encounter for fracture with routine healing    • Diverticular disease of colon    • Dog bite of hand    • Epigastric pain    • Essential hypertension    • Foot pain    • GERD (gastroesophageal reflux disease)    • H/O screening mammography    • Hallux valgus    • Heartburn    • Hyperlipidemia    • Joint pain    • Joint swelling    • Neurologic disorder associated with diabetes mellitus (CMS/HCC)     type 2   • Nondisplaced fracture of fourth metatarsal bone, right foot, initial encounter for closed fracture    • Nondisplaced fracture of fourth metatarsal bone, right foot, subsequent encounter for fracture with " routine healing    • Pain in right leg    • Superficial laceration of hand    • Type 2 diabetes mellitus (CMS/HCC)    • Urinary tract infectious disease    • Venous insufficiency (chronic) (peripheral)      Diabetes   She presents for her follow-up diabetic visit. She has type 2 diabetes mellitus. Her disease course has been stable. There are no hypoglycemic associated symptoms. Pertinent negatives for hypoglycemia include no headaches or sweats. There are no diabetic associated symptoms. Pertinent negatives for diabetes include no blurred vision and no chest pain. There are no hypoglycemic complications. Symptoms are stable. There are no diabetic complications. Risk factors for coronary artery disease include diabetes mellitus, dyslipidemia, hypertension and post-menopausal. Current diabetic treatment includes diet and oral agent (dual therapy). She is compliant with treatment most of the time. Her weight is decreasing steadily. She is following a generally healthy diet. Her home blood glucose trend is decreasing steadily. An ACE inhibitor/angiotensin II receptor blocker is being taken.   Hypertension   This is a chronic problem. The current episode started more than 1 year ago. The problem is unchanged. The problem is controlled. Pertinent negatives include no anxiety, blurred vision, chest pain, headaches, malaise/fatigue, neck pain, orthopnea, palpitations, peripheral edema, PND, shortness of breath or sweats. Agents associated with hypertension include NSAIDs. Risk factors for coronary artery disease include post-menopausal state, dyslipidemia and diabetes mellitus. Past treatments include lifestyle changes and ACE inhibitors. Current antihypertension treatment includes lifestyle changes and ACE inhibitors. The current treatment provides moderate improvement. Compliance problems include exercise and diet.  Identifiable causes of hypertension include a hypertension causing med. There is no history of chronic  renal disease.   Hyperlipidemia   This is a chronic problem. Exacerbating diseases include diabetes. She has no history of chronic renal disease, hypothyroidism, liver disease or nephrotic syndrome. Factors aggravating her hyperlipidemia include fatty foods. Pertinent negatives include no chest pain, focal sensory loss, focal weakness, leg pain, myalgias or shortness of breath. Current antihyperlipidemic treatment includes diet change, exercise and statins. The current treatment provides moderate improvement of lipids. Compliance problems include adherence to exercise and adherence to diet.  Risk factors for coronary artery disease include post-menopausal, hypertension and dyslipidemia.   Depression   Visit Type: follow-up  Patient is not experiencing: palpitations and shortness of breath.  Frequency of symptoms: occasionally   Severity: mild   Sleep quality: good  Nighttime awakenings: none  Compliance with medications:  %        Knee Pain    The incident occurred more than 1 week ago. There was no injury mechanism. The pain is present in the left knee and right knee. The pain is moderate. The pain has been constant since onset. Associated symptoms include a loss of motion. Pertinent negatives include no inability to bear weight, loss of sensation, muscle weakness, numbness or tingling. She reports no foreign bodies present. The symptoms are aggravated by movement, palpation and weight bearing. She has tried rest, NSAIDs, non-weight bearing and acetaminophen for the symptoms. The treatment provided mild relief.        The following portions of the patient's history were reviewed and updated as appropriate: allergies, current medications, past family history, past medical history, past social history, past surgical history and problem list.    Review of Systems   Constitutional: Negative.  Negative for malaise/fatigue.   HENT: Negative.    Eyes: Negative.  Negative for blurred vision.   Respiratory: Negative.   Negative for shortness of breath.    Cardiovascular: Negative.  Negative for chest pain, palpitations, orthopnea and PND.   Gastrointestinal: Negative.    Endocrine: Negative.    Genitourinary: Negative.    Musculoskeletal: Positive for arthralgias, gait problem and joint swelling. Negative for myalgias and neck pain.   Skin: Negative.    Allergic/Immunologic: Negative.    Neurological: Negative for tingling, focal weakness, numbness and headaches.   Hematological: Negative.    Psychiatric/Behavioral: Negative.        Objective   Physical Exam   Constitutional: She is oriented to person, place, and time. She appears well-developed and well-nourished. No distress.   HENT:   Head: Normocephalic and atraumatic.   Neck: Normal carotid pulses present. Carotid bruit is not present.   Cardiovascular: Normal rate, regular rhythm and normal heart sounds. Exam reveals no gallop and no friction rub.   No murmur heard.  Pulmonary/Chest: Effort normal and breath sounds normal. No stridor. No respiratory distress. She has no wheezes. She has no rales.   Musculoskeletal: She exhibits tenderness.        Right knee: She exhibits decreased range of motion. Tenderness found.        Left knee: She exhibits decreased range of motion. Tenderness found.   Neurological: She is alert and oriented to person, place, and time.   Skin: Skin is warm. No rash noted. She is not diaphoretic. No erythema. No pallor.   Psychiatric: She has a normal mood and affect. Her behavior is normal.   Nursing note and vitals reviewed.      Assessment/Plan   Odessa was seen today for follow-up and diabetes.    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  -     simvastatin (ZOCOR) 40 MG tablet; Take 1 tablet by mouth every night at bedtime.  -     Semaglutide (OZEMPIC) 1 MG/DOSE solution pen-injector; Inject 1 mg under the skin into the appropriate area as directed 1 (One) Time Per Week.    She is given refills on Zocor and Zoloft as above, will change Trulicity to ozempic due to insurance coverage.  She is advised on how to use and will start 0.5 mg weekly and follow-up here in 1 month for recheck or sooner as needed, she is aware and is in agreement to this plan, advised to continue monitoring her blood pressure and blood sugars closely.  Will be referred on to orthopedic for further evaluation of bilateral knee pain, has seen Dr. Jacobs previously and is referred back to Dr. Jacobs today.  Patient aware and in agreement to this plan.  All questions and concerns are addressed with understanding noted.

## 2019-03-13 ENCOUNTER — LAB (OUTPATIENT)
Dept: LAB | Facility: OTHER | Age: 64
End: 2019-03-13

## 2019-03-13 DIAGNOSIS — E78.5 HYPERLIPIDEMIA, UNSPECIFIED HYPERLIPIDEMIA TYPE: ICD-10-CM

## 2019-03-13 DIAGNOSIS — E11.8 TYPE 2 DIABETES MELLITUS WITH COMPLICATION, WITHOUT LONG-TERM CURRENT USE OF INSULIN (HCC): ICD-10-CM

## 2019-03-13 DIAGNOSIS — E61.1 LOW IRON: ICD-10-CM

## 2019-03-13 DIAGNOSIS — I10 ESSENTIAL HYPERTENSION: ICD-10-CM

## 2019-03-13 LAB
ALBUMIN SERPL-MCNC: 4.7 G/DL (ref 3.5–5)
ALBUMIN/GLOB SERPL: 1.6 G/DL (ref 1.1–1.8)
ALP SERPL-CCNC: 53 U/L (ref 38–126)
ALT SERPL W P-5'-P-CCNC: 21 U/L
ANION GAP SERPL CALCULATED.3IONS-SCNC: 12 MMOL/L (ref 5–15)
AST SERPL-CCNC: 28 U/L (ref 14–36)
BASOPHILS # BLD AUTO: 0.02 10*3/MM3 (ref 0–0.2)
BASOPHILS NFR BLD AUTO: 0.3 % (ref 0–2)
BILIRUB SERPL-MCNC: 0.5 MG/DL (ref 0.2–1.3)
BUN BLD-MCNC: 28 MG/DL (ref 7–17)
BUN/CREAT SERPL: 24.8 (ref 7–25)
CALCIUM SPEC-SCNC: 9.6 MG/DL (ref 8.4–10.2)
CHLORIDE SERPL-SCNC: 104 MMOL/L (ref 98–107)
CHOLEST SERPL-MCNC: 123 MG/DL (ref 150–200)
CO2 SERPL-SCNC: 26 MMOL/L (ref 22–30)
CREAT BLD-MCNC: 1.13 MG/DL (ref 0.52–1.04)
DEPRECATED RDW RBC AUTO: 43.4 FL (ref 36.4–46.3)
EOSINOPHIL # BLD AUTO: 0.09 10*3/MM3 (ref 0–0.7)
EOSINOPHIL NFR BLD AUTO: 1.4 % (ref 0–7)
ERYTHROCYTE [DISTWIDTH] IN BLOOD BY AUTOMATED COUNT: 13 % (ref 11.5–14.5)
FERRITIN SERPL-MCNC: 30.6 NG/ML (ref 11.1–264)
FOLATE SERPL-MCNC: >20 NG/ML (ref 2.76–21)
GFR SERPL CREATININE-BSD FRML MDRD: 48 ML/MIN/1.73 (ref 45–104)
GLOBULIN UR ELPH-MCNC: 2.9 GM/DL (ref 2.3–3.5)
GLUCOSE BLD-MCNC: 126 MG/DL (ref 74–99)
HBA1C MFR BLD: 6.7 % (ref 4–5.6)
HCT VFR BLD AUTO: 35.2 % (ref 35–45)
HDLC SERPL-MCNC: 55 MG/DL (ref 40–59)
HGB BLD-MCNC: 11.2 G/DL (ref 12–15.5)
IRON 24H UR-MRATE: 80 MCG/DL (ref 37–170)
IRON SATN MFR SERPL: 22 % (ref 15–50)
LDLC SERPL CALC-MCNC: 43 MG/DL
LDLC/HDLC SERPL: 0.78 {RATIO} (ref 0–3.22)
LYMPHOCYTES # BLD AUTO: 1.94 10*3/MM3 (ref 0.6–4.2)
LYMPHOCYTES NFR BLD AUTO: 30.9 % (ref 10–50)
MCH RBC QN AUTO: 30.2 PG (ref 26.5–34)
MCHC RBC AUTO-ENTMCNC: 31.8 G/DL (ref 31.4–36)
MCV RBC AUTO: 94.9 FL (ref 80–98)
MONOCYTES # BLD AUTO: 0.51 10*3/MM3 (ref 0–0.9)
MONOCYTES NFR BLD AUTO: 8.1 % (ref 0–12)
NEUTROPHILS # BLD AUTO: 3.71 10*3/MM3 (ref 2–8.6)
NEUTROPHILS NFR BLD AUTO: 59.3 % (ref 37–80)
PLATELET # BLD AUTO: 215 10*3/MM3 (ref 150–450)
PMV BLD AUTO: 9.2 FL (ref 8–12)
POTASSIUM BLD-SCNC: 4.7 MMOL/L (ref 3.4–5)
PROT SERPL-MCNC: 7.6 G/DL (ref 6.3–8.2)
RBC # BLD AUTO: 3.71 10*6/MM3 (ref 3.77–5.16)
SODIUM BLD-SCNC: 142 MMOL/L (ref 137–145)
TIBC SERPL-MCNC: 358 MCG/DL (ref 265–497)
TRIGL SERPL-MCNC: 125 MG/DL
VLDLC SERPL-MCNC: 25 MG/DL
WBC NRBC COR # BLD: 6.27 10*3/MM3 (ref 3.2–9.8)

## 2019-03-13 PROCEDURE — 83540 ASSAY OF IRON: CPT | Performed by: NURSE PRACTITIONER

## 2019-03-13 PROCEDURE — 83036 HEMOGLOBIN GLYCOSYLATED A1C: CPT | Performed by: NURSE PRACTITIONER

## 2019-03-13 PROCEDURE — 82746 ASSAY OF FOLIC ACID SERUM: CPT | Performed by: NURSE PRACTITIONER

## 2019-03-13 PROCEDURE — 82728 ASSAY OF FERRITIN: CPT | Performed by: NURSE PRACTITIONER

## 2019-03-13 PROCEDURE — 80061 LIPID PANEL: CPT | Performed by: NURSE PRACTITIONER

## 2019-03-13 PROCEDURE — 83550 IRON BINDING TEST: CPT | Performed by: NURSE PRACTITIONER

## 2019-03-13 PROCEDURE — 85025 COMPLETE CBC W/AUTO DIFF WBC: CPT | Performed by: NURSE PRACTITIONER

## 2019-03-13 PROCEDURE — 80053 COMPREHEN METABOLIC PANEL: CPT | Performed by: NURSE PRACTITIONER

## 2019-03-13 PROCEDURE — 36415 COLL VENOUS BLD VENIPUNCTURE: CPT | Performed by: NURSE PRACTITIONER

## 2019-03-18 DIAGNOSIS — R79.9 ELEVATED BUN: Primary | ICD-10-CM

## 2019-04-11 ENCOUNTER — LAB (OUTPATIENT)
Dept: LAB | Facility: OTHER | Age: 64
End: 2019-04-11

## 2019-04-11 ENCOUNTER — OFFICE VISIT (OUTPATIENT)
Dept: FAMILY MEDICINE CLINIC | Facility: CLINIC | Age: 64
End: 2019-04-11

## 2019-04-11 VITALS
BODY MASS INDEX: 21.33 KG/M2 | WEIGHT: 128 LBS | DIASTOLIC BLOOD PRESSURE: 72 MMHG | HEIGHT: 65 IN | SYSTOLIC BLOOD PRESSURE: 114 MMHG | TEMPERATURE: 98.8 F | HEART RATE: 69 BPM

## 2019-04-11 DIAGNOSIS — E78.5 HYPERLIPIDEMIA, UNSPECIFIED HYPERLIPIDEMIA TYPE: Chronic | ICD-10-CM

## 2019-04-11 DIAGNOSIS — E11.69 TYPE 2 DIABETES MELLITUS WITH OTHER SPECIFIED COMPLICATION, WITHOUT LONG-TERM CURRENT USE OF INSULIN (HCC): Primary | Chronic | ICD-10-CM

## 2019-04-11 DIAGNOSIS — I10 ESSENTIAL HYPERTENSION: Chronic | ICD-10-CM

## 2019-04-11 DIAGNOSIS — E11.69 TYPE 2 DIABETES MELLITUS WITH OTHER SPECIFIED COMPLICATION, WITHOUT LONG-TERM CURRENT USE OF INSULIN (HCC): ICD-10-CM

## 2019-04-11 LAB
ALBUMIN SERPL-MCNC: 4.6 G/DL (ref 3.5–5)
ALBUMIN/GLOB SERPL: 1.5 G/DL (ref 1.1–1.8)
ALP SERPL-CCNC: 55 U/L (ref 38–126)
ALT SERPL W P-5'-P-CCNC: 20 U/L
ANION GAP SERPL CALCULATED.3IONS-SCNC: 13 MMOL/L (ref 5–15)
AST SERPL-CCNC: 24 U/L (ref 14–36)
BILIRUB SERPL-MCNC: 0.5 MG/DL (ref 0.2–1.3)
BUN BLD-MCNC: 32 MG/DL (ref 7–23)
BUN/CREAT SERPL: 24.8 (ref 7–25)
CALCIUM SPEC-SCNC: 9.9 MG/DL (ref 8.4–10.2)
CHLORIDE SERPL-SCNC: 100 MMOL/L (ref 101–112)
CO2 SERPL-SCNC: 24 MMOL/L (ref 22–30)
CREAT BLD-MCNC: 1.29 MG/DL (ref 0.52–1.04)
GFR SERPL CREATININE-BSD FRML MDRD: 41 ML/MIN/1.73 (ref 45–104)
GLOBULIN UR ELPH-MCNC: 3 GM/DL (ref 2.3–3.5)
GLUCOSE BLD-MCNC: 197 MG/DL (ref 70–99)
POTASSIUM BLD-SCNC: 5.1 MMOL/L (ref 3.4–5)
PROT SERPL-MCNC: 7.6 G/DL (ref 6.3–8.6)
SODIUM BLD-SCNC: 137 MMOL/L (ref 137–145)

## 2019-04-11 PROCEDURE — 36415 COLL VENOUS BLD VENIPUNCTURE: CPT | Performed by: NURSE PRACTITIONER

## 2019-04-11 PROCEDURE — 99214 OFFICE O/P EST MOD 30 MIN: CPT | Performed by: NURSE PRACTITIONER

## 2019-04-11 PROCEDURE — 80053 COMPREHEN METABOLIC PANEL: CPT | Performed by: NURSE PRACTITIONER

## 2019-04-16 ENCOUNTER — TELEPHONE (OUTPATIENT)
Dept: FAMILY MEDICINE CLINIC | Facility: CLINIC | Age: 64
End: 2019-04-16

## 2019-04-16 NOTE — TELEPHONE ENCOUNTER
Informed pt and told to stop metformin, repeat all other meds and repeat cmp and a1c blood work in 3 months.   Pt stated understanding.   ----- Message from SIDDHARTH Craven sent at 4/15/2019  5:48 PM CDT -----  Inform pt, stop metformin, repeat cmp and a1c in 3 months, continue all other meds same

## 2019-04-16 NOTE — PROGRESS NOTES
Subjective   Odessa Walker is a 64 y.o. female. Patient here today with complaints of Diabetes (1 mo f/u)  Patient here today for recheck of hypertension, hyperlipidemia, diabetes.  Reports fingerstick blood sugar ranging , on average 120s.  Reports that she was doing much better until her   end of March, sees nephrology next week for elevated kidney functions.  Reports blood sugars improved since changing from Trulicity to Ozempic    Vitals:    19 1307   BP: 114/72   Pulse: 69   Temp: 98.8 °F (37.1 °C)     Past Medical History:   Diagnosis Date   • Benign essential hypertension    • Candidiasis of skin and nail     L foot   • Chest pain    • Chronic anemia    • Claudication (CMS/HCC)    • Corns and callosities    • Depressive disorder    • Dermatitis    • Disease of nail     other specified   • Disorder of peripheral nervous system    • Displaced fracture of third metatarsal bone, right foot, initial encounter for closed fracture    • Displaced fracture of third metatarsal bone, right foot, subsequent encounter for fracture with routine healing    • Diverticular disease of colon    • Dog bite of hand    • Epigastric pain    • Essential hypertension    • Foot pain    • GERD (gastroesophageal reflux disease)    • H/O screening mammography    • Hallux valgus    • Heartburn    • Hyperlipidemia    • Joint pain    • Joint swelling    • Neurologic disorder associated with diabetes mellitus (CMS/HCC)     type 2   • Nondisplaced fracture of fourth metatarsal bone, right foot, initial encounter for closed fracture    • Nondisplaced fracture of fourth metatarsal bone, right foot, subsequent encounter for fracture with routine healing    • Pain in right leg    • Superficial laceration of hand    • Type 2 diabetes mellitus (CMS/HCC)    • Urinary tract infectious disease    • Venous insufficiency (chronic) (peripheral)      Diabetes   She presents for her follow-up diabetic visit. She has type 2  diabetes mellitus. Her disease course has been stable. There are no hypoglycemic associated symptoms. Pertinent negatives for hypoglycemia include no headaches or sweats. There are no diabetic associated symptoms. Pertinent negatives for diabetes include no blurred vision and no chest pain. There are no hypoglycemic complications. Symptoms are stable. Risk factors for coronary artery disease include dyslipidemia, diabetes mellitus, post-menopausal and hypertension. Current diabetic treatment includes diet and oral agent (dual therapy) (ozempic imjections). She is compliant with treatment all of the time. Her weight is decreasing steadily. She is following a generally healthy diet. She participates in exercise intermittently. Her home blood glucose trend is decreasing steadily. An ACE inhibitor/angiotensin II receptor blocker is being taken.   Hypertension   This is a chronic problem. The current episode started more than 1 year ago. The problem is unchanged. The problem is controlled. Pertinent negatives include no anxiety ( recently  ), blurred vision, chest pain, headaches, malaise/fatigue, neck pain, orthopnea, palpitations, peripheral edema, PND, shortness of breath or sweats. Agents associated with hypertension include NSAIDs. Risk factors for coronary artery disease include post-menopausal state, dyslipidemia, diabetes mellitus and stress. Past treatments include lifestyle changes and ACE inhibitors. Current antihypertension treatment includes lifestyle changes and ACE inhibitors. The current treatment provides moderate improvement. There are no compliance problems.  Identifiable causes of hypertension include a hypertension causing med. There is no history of chronic renal disease.   Hyperlipidemia   This is a chronic problem. The current episode started more than 1 year ago. Exacerbating diseases include diabetes. She has no history of chronic renal disease, hypothyroidism, liver disease or  obesity. There are no known factors aggravating her hyperlipidemia. Pertinent negatives include no chest pain, focal sensory loss, focal weakness, leg pain, myalgias or shortness of breath. Current antihyperlipidemic treatment includes statins, diet change and exercise. The current treatment provides moderate improvement of lipids. Compliance problems include psychosocial issues.  Risk factors for coronary artery disease include post-menopausal, stress, hypertension, dyslipidemia and diabetes mellitus.        The following portions of the patient's history were reviewed and updated as appropriate: allergies, current medications, past family history, past medical history, past social history, past surgical history and problem list.    Review of Systems   Constitutional: Negative.  Negative for malaise/fatigue.   HENT: Negative.    Eyes: Negative.  Negative for blurred vision.   Respiratory: Negative.  Negative for shortness of breath.    Cardiovascular: Negative.  Negative for chest pain, palpitations, orthopnea and PND.   Gastrointestinal: Negative.    Endocrine: Negative.    Genitourinary: Negative.    Musculoskeletal: Negative.  Negative for myalgias and neck pain.   Skin: Negative.    Allergic/Immunologic: Negative.    Neurological: Negative.  Negative for focal weakness and headaches.   Hematological: Negative.    Psychiatric/Behavioral: Negative.        Objective   Physical Exam   Constitutional: She is oriented to person, place, and time. Vital signs are normal. She appears well-developed and well-nourished. No distress.   HENT:   Head: Normocephalic and atraumatic.   Neck: Normal carotid pulses present. Carotid bruit is not present.   Cardiovascular: Normal rate, regular rhythm, normal heart sounds and intact distal pulses. Exam reveals no gallop and no friction rub.   No murmur heard.  Pulmonary/Chest: Effort normal and breath sounds normal. No stridor. No respiratory distress. She has no wheezes. She has no  rales. She exhibits no tenderness.   Abdominal: Soft. Bowel sounds are normal. She exhibits no distension and no mass. There is no tenderness. There is no rebound and no guarding. No hernia.   Musculoskeletal: She exhibits no edema.   Neurological: She is alert and oriented to person, place, and time. She has normal reflexes.   Skin: Skin is warm and dry. No rash noted. She is not diaphoretic. No erythema. No pallor.   Psychiatric: She has a normal mood and affect. Her behavior is normal.   Nursing note and vitals reviewed.      Assessment/Plan   Odessa was seen today for diabetes.    Diagnoses and all orders for this visit:    Type 2 diabetes mellitus with other specified complication, without long-term current use of insulin (CMS/[facility])  -     Comprehensive metabolic panel; Future    Hyperlipidemia, unspecified hyperlipidemia type    Essential hypertension    I will obtain CMP today, recheck BUN and creatinine as well as glucose   She will be informed of results via phone   Follow-up here in 3 months for recheck sooner if needed   Already has nephrology visit next week scheduled   Encouraged to continue monitoring blood glucose levels at home at random times daily and as needed   Continue on Ozempic  Patient aware and in agreement to this plan   all questions and concerns are addressed with understanding noted.

## 2019-04-23 DIAGNOSIS — M25.561 PAIN IN BOTH KNEES, UNSPECIFIED CHRONICITY: Primary | ICD-10-CM

## 2019-04-23 DIAGNOSIS — M25.562 PAIN IN BOTH KNEES, UNSPECIFIED CHRONICITY: Primary | ICD-10-CM

## 2019-04-24 ENCOUNTER — TELEPHONE (OUTPATIENT)
Dept: FAMILY MEDICINE CLINIC | Facility: CLINIC | Age: 64
End: 2019-04-24

## 2019-04-24 ENCOUNTER — OFFICE VISIT (OUTPATIENT)
Dept: ORTHOPEDIC SURGERY | Facility: CLINIC | Age: 64
End: 2019-04-24

## 2019-04-24 VITALS — BODY MASS INDEX: 21.33 KG/M2 | WEIGHT: 128 LBS | HEIGHT: 65 IN

## 2019-04-24 DIAGNOSIS — Z96.659 ASEPTIC LOOSENING OF PROSTHETIC KNEE, SUBSEQUENT ENCOUNTER: ICD-10-CM

## 2019-04-24 DIAGNOSIS — M25.561 CHRONIC PAIN OF RIGHT KNEE: Primary | ICD-10-CM

## 2019-04-24 DIAGNOSIS — G89.29 CHRONIC PAIN OF RIGHT KNEE: Primary | ICD-10-CM

## 2019-04-24 DIAGNOSIS — T84.038D ASEPTIC LOOSENING OF PROSTHETIC KNEE, SUBSEQUENT ENCOUNTER: ICD-10-CM

## 2019-04-24 DIAGNOSIS — Z96.651 PRESENCE OF TOTAL RIGHT KNEE JOINT PROSTHESIS: ICD-10-CM

## 2019-04-24 DIAGNOSIS — E11.9 TYPE 2 DIABETES MELLITUS WITHOUT COMPLICATION, WITHOUT LONG-TERM CURRENT USE OF INSULIN (HCC): ICD-10-CM

## 2019-04-24 DIAGNOSIS — I10 ESSENTIAL HYPERTENSION: ICD-10-CM

## 2019-04-24 PROCEDURE — 99213 OFFICE O/P EST LOW 20 MIN: CPT | Performed by: ORTHOPAEDIC SURGERY

## 2019-04-24 NOTE — PROGRESS NOTES
"Odessa Walker is a 64 y.o. female returns for     Chief Complaint   Patient presents with   • Right Knee - Pain     Xray today       HISTORY OF PRESENT ILLNESS:  Follow up right knee pain.  Xray done today.    She is having more aching, popping, and swelling.  Walking and standing makes her pain worse.  She has tried antiinflammatories.    History of total knee in [place] in 2000.  Patient was seen in our office 7/25/18.  We were planning a total knee revision at that time but she had some problems with blood pressure and blood sugar.  Her  passed away about 1 month ago.    She has pain with activity.  Pain with attempted mobility.  She is using a walker all the time.  She has difficulty with standing from a seated position and with prolonged standing or walking.  She reports that her knee just gives out occasionally and she nearly falls.  No numbness or tingling.       CONCURRENT MEDICAL HISTORY:    The following portions of the patient's history were reviewed and updated as appropriate: allergies, current medications, past family history, past medical history, past social history, past surgical history and problem list.     ROS  No fevers or chills.  No chest pain or shortness of air.  No GI or  disturbances.    PHYSICAL EXAMINATION:       Ht 165.1 cm (65\")   Wt 58.1 kg (128 lb)   BMI 21.30 kg/m²     Physical Exam   Constitutional: She is oriented to person, place, and time. She appears well-developed and well-nourished.   Musculoskeletal:        Right knee: She exhibits no effusion.        Left knee: She exhibits no effusion.   Neurological: She is alert and oriented to person, place, and time.   Psychiatric: She has a normal mood and affect. Her behavior is normal. Judgment and thought content normal.       GAIT:     []  Normal  [x]  Antalgic    Assistive device: []  None  [x]  Walker     []  Crutches  []  Cane     []  Wheelchair  []  Stretcher    Right Knee Exam     Tenderness   Right knee " tenderness location: tender along the inferior aspect of patella especially overlying the 3x4cm mass on anterior aspect of knee.    Range of Motion   Extension: -5   Flexion: 120     Tests   Varus: positive Valgus: positive    Other   Erythema: absent  Scars: present  Sensation: normal  Pulse: present  Swelling: mild  Effusion: no effusion present      Left Knee Exam     Muscle Strength   The patient has normal left knee strength.    Tenderness   The patient is experiencing no tenderness.     Range of Motion   Extension: 0   Flexion: 120     Tests   Varus: negative Valgus: negative  Drawer:  Anterior - negative         Other   Erythema: absent  Sensation: normal  Pulse: present  Swelling: none  Effusion: no effusion present              Xr Knee Bilateral Ap Standing    Result Date: 4/25/2019  Narrative: Ordering Provider:  Pradeep Jacobs MD Ordering Diagnosis/Indication:  Pain in both knees, unspecified chronicity Procedure:  XR KNEE BILATERAL AP STANDING Exam Date:  4/24/19 COMPARISON:  Todays X-rays were compared to previous images dated April 11, 2018.     Impression:  AP bilateral standing of the knees with lateral of each knee shows a total knee arthroplasty on the right that is malpositioned and appears to be progressively shifting within the tibia.  There is some collapse of the posterior aspect of the proximal tibia.  No definitive signs of ostial lysis but loosening has to be expected.  The left knee shows severe arthritic changes with complete lateral joint space collapse and valgus positioning of the knee.  There are bone-on-bone findings in the lateral compartment that involve essentially the entire lateral compartment.  No acute bony abnormalities are noted in either knee.  Moderate to severe arthritic changes noted in the lateral view of the left knee. CT scan would help to better identify the bony structure and integrity of the proximal tibia. Pradeep Jacobs MD 4/24/19     Xr Knee 1  Or 2 View Bilateral    Result Date: 4/25/2019  Narrative: Ordering Provider:  Pradeep Jacobs MD Ordering Diagnosis/Indication:  Pain in both knees, unspecified chronicity Procedure:  XR KNEE 1 OR 2 VW BILATERAL Exam Date:  4/24/19 COMPARISON:  Todays X-rays were compared to previous images dated April 11, 2018.     Impression:  AP bilateral standing of the knees with lateral of each knee shows a total knee arthroplasty on the right that is malpositioned and appears to be progressively shifting within the tibia.  There is some collapse of the posterior aspect of the proximal tibia.  No definitive signs of ostial lysis but loosening has to be expected.  The left knee shows severe arthritic changes with complete lateral joint space collapse and valgus positioning of the knee.  There are bone-on-bone findings in the lateral compartment that involve essentially the entire lateral compartment.  No acute bony abnormalities are noted in either knee.  Moderate to severe arthritic changes noted in the lateral view of the left knee. CT scan would help to better identify the bony structure and integrity of the proximal tibia. 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  Mal-positioning.  Has a a fragment or object in anterior aspect of knee palpable over anterior aspect just inferior to patella.  Ct Will help to identify this object and also to assess bone  integrity around existing implant    Will need TKA revision.      Continue use of walker.    Patient's Body mass index is 21.3 kg/m². BMI is within normal parameters. No follow-up required..    Return for After CT scan.    Pradeep Jacobs MD

## 2019-05-01 DIAGNOSIS — Z78.0 POST-MENOPAUSAL: Primary | ICD-10-CM

## 2019-05-28 RX ORDER — OMEPRAZOLE 40 MG/1
40 CAPSULE, DELAYED RELEASE ORAL DAILY
Qty: 30 CAPSULE | Refills: 5 | Status: SHIPPED | OUTPATIENT
Start: 2019-05-28 | End: 2020-03-12

## 2019-05-28 RX ORDER — LISINOPRIL 40 MG/1
TABLET ORAL
Qty: 15 TABLET | Refills: 5 | Status: SHIPPED | OUTPATIENT
Start: 2019-05-28 | End: 2019-07-15

## 2019-05-28 RX ORDER — GLIPIZIDE 10 MG/1
TABLET ORAL
Qty: 60 TABLET | Refills: 5 | Status: SHIPPED | OUTPATIENT
Start: 2019-05-28 | End: 2019-07-11

## 2019-06-26 ENCOUNTER — OFFICE VISIT (OUTPATIENT)
Dept: ORTHOPEDIC SURGERY | Facility: CLINIC | Age: 64
End: 2019-06-26

## 2019-06-26 VITALS — WEIGHT: 128 LBS | BODY MASS INDEX: 21.33 KG/M2 | HEIGHT: 65 IN

## 2019-06-26 DIAGNOSIS — I10 ESSENTIAL HYPERTENSION: ICD-10-CM

## 2019-06-26 DIAGNOSIS — M23.41 LOOSE BODY IN KNEE, RIGHT KNEE: Primary | ICD-10-CM

## 2019-06-26 DIAGNOSIS — G89.29 CHRONIC PAIN OF RIGHT KNEE: ICD-10-CM

## 2019-06-26 DIAGNOSIS — T84.038D ASEPTIC LOOSENING OF PROSTHETIC KNEE, SUBSEQUENT ENCOUNTER: ICD-10-CM

## 2019-06-26 DIAGNOSIS — Z96.659 ASEPTIC LOOSENING OF PROSTHETIC KNEE, SUBSEQUENT ENCOUNTER: ICD-10-CM

## 2019-06-26 DIAGNOSIS — M25.561 CHRONIC PAIN OF RIGHT KNEE: ICD-10-CM

## 2019-06-26 DIAGNOSIS — Z96.651 PRESENCE OF TOTAL RIGHT KNEE JOINT PROSTHESIS: ICD-10-CM

## 2019-06-26 DIAGNOSIS — E11.9 TYPE 2 DIABETES MELLITUS WITHOUT COMPLICATION, WITHOUT LONG-TERM CURRENT USE OF INSULIN (HCC): ICD-10-CM

## 2019-06-26 PROCEDURE — 99214 OFFICE O/P EST MOD 30 MIN: CPT | Performed by: ORTHOPAEDIC SURGERY

## 2019-06-26 RX ORDER — GLIPIZIDE 10 MG/1
TABLET ORAL
Qty: 60 TABLET | Refills: 5 | Status: SHIPPED | OUTPATIENT
Start: 2019-06-26 | End: 2019-07-11

## 2019-06-26 NOTE — PROGRESS NOTES
Odessa Walker is a 64 y.o. female returns for     Chief Complaint   Patient presents with   • Right Knee - Follow-up, Pain   • Results       HISTORY OF PRESENT ILLNESS: f/u right knee pain, ct done on 5/28/2019  She is unhappy with quality of life.  Pain with every day activity.  No numbness or tingling.  Better since using the walker.  No fever or chills  Pain is severe at times.     CONCURRENT MEDICAL HISTORY:    Past Medical History:   Diagnosis Date   • Benign essential hypertension    • Candidiasis of skin and nail     L foot   • Chest pain    • Chronic anemia    • Claudication (CMS/HCC)    • Corns and callosities    • Depressive disorder    • Dermatitis    • Disease of nail     other specified   • Disorder of peripheral nervous system    • Displaced fracture of third metatarsal bone, right foot, initial encounter for closed fracture    • Displaced fracture of third metatarsal bone, right foot, subsequent encounter for fracture with routine healing    • Diverticular disease of colon    • Dog bite of hand    • Epigastric pain    • Essential hypertension    • Foot pain    • GERD (gastroesophageal reflux disease)    • H/O screening mammography    • Hallux valgus    • Heartburn    • Hyperlipidemia    • Joint pain    • Joint swelling    • Neurologic disorder associated with diabetes mellitus (CMS/HCC)     type 2   • Nondisplaced fracture of fourth metatarsal bone, right foot, initial encounter for closed fracture    • Nondisplaced fracture of fourth metatarsal bone, right foot, subsequent encounter for fracture with routine healing    • Pain in right leg    • Superficial laceration of hand    • Type 2 diabetes mellitus (CMS/HCC)    • Urinary tract infectious disease    • Venous insufficiency (chronic) (peripheral)        Allergies   Allergen Reactions   • Penicillins        Current Outpatient Medications on File Prior to Visit   Medication Sig   • Calcium Carbonate (CALCIUM 600 PO) Take  by mouth.   • glipiZIDE  (GLUCOTROL) 10 MG tablet TAKE 1 TABLET BY MOUTH TWICE DAILY BEFORE MEALS   • glipiZIDE (GLUCOTROL) 10 MG tablet TAKE 1 TABLET BY MOUTH TWICE DAILY BEFORE MEALS   • glipiZIDE (GLUCOTROL) 10 MG tablet TAKE 1 TABLET BY MOUTH TWICE DAILY BEFORE MEALS   • Glucosamine HCl 1000 MG tablet Take  by mouth.   • glucose blood (ONETOUCH VERIO) test strip 1 each by Other route As Needed (diabetes). Use once daily for diabetes DX E11.9   • glucose monitor monitoring kit 1 each Daily. DX:E11.9   • IRON PO Take 65 mg by mouth.   • Lancets misc Once daily   • lisinopril (PRINIVIL,ZESTRIL) 40 MG tablet TAKE 1/2 TABLET BY MOUTH DAILY   • Multiple Vitamins-Minerals (ONE-A-DAY 50 PLUS PO) Take  by mouth.   • omeprazole (priLOSEC) 40 MG capsule TAKE 1 CAPSULE BY MOUTH DAILY   • ONE TOUCH LANCETS misc 1 applicator Daily.   • Semaglutide (OZEMPIC) 1 MG/DOSE solution pen-injector Inject 1 mg under the skin into the appropriate area as directed 1 (One) Time Per Week.   • sertraline (ZOLOFT) 50 MG tablet Take 1 tablet by mouth Daily.   • simvastatin (ZOCOR) 40 MG tablet Take 1 tablet by mouth every night at bedtime.   • sulindac (CLINORIL) 200 MG tablet TAKE 1 TABLET BY MOUTH TWICE DAILY   • traMADol (ULTRAM) 50 MG tablet Take 1 tablet by mouth Every 6 (Six) Hours As Needed for Moderate Pain .     No current facility-administered medications on file prior to visit.        Past Surgical History:   Procedure Laterality Date   • BACK SURGERY  2007   • COLONOSCOPY  11/11/2015    Diverticulosis found in the sigmoid colon. Hemorrhoids found in the anus.   • KNEE SURGERY  2005   • OOPHORECTOMY     • OTHER SURGICAL HISTORY  05/06/2015    DEBRIDE NAIL 6 OR MORE    • OTHER SURGICAL HISTORY  05/06/2015    PARING CORN/CALLUS    • OTHER SURGICAL HISTORY  10/25/2015    TREAT METATARSAL FRACTURE    • UPPER GASTROINTESTINAL ENDOSCOPY  11/11/2015    Esophagitis seen.Biopsy taken.A hiatus hernia was found in the esophagus.Gastritis found in the stomach.Biopsy  "taken   • US VENOUS EXTREMITY UNILATERAL  01/26/2016   • VAGINAL HYSTERECTOMY SALPINGO OOPHORECTOMY  1999       Family History   Problem Relation Age of Onset   • Heart disease Mother    • Osteoarthritis Father    • Diabetes Other        Social History     Socioeconomic History   • Marital status:      Spouse name: Not on file   • Number of children: Not on file   • Years of education: Not on file   • Highest education level: Not on file   Tobacco Use   • Smoking status: Former Smoker   • Smokeless tobacco: Never Used   Substance and Sexual Activity   • Alcohol use: No   • Drug use: No   • Sexual activity: Defer             ROS  No fevers or chills.  No chest pain or shortness of air.  No GI or  disturbances.  Other than pain in right knee, all other systems reviewed as negative.    PHYSICAL EXAMINATION:       Ht 165.1 cm (65\")   Wt 58.1 kg (128 lb)   BMI 21.30 kg/m²     Physical Exam   Constitutional: She is oriented to person, place, and time. She appears well-developed and well-nourished. No distress.   Cardiovascular: Normal rate and regular rhythm.   Pulmonary/Chest: Effort normal and breath sounds normal.   Abdominal: Soft. Bowel sounds are normal.   Musculoskeletal:        Right knee: She exhibits no effusion.        Left knee: She exhibits no effusion.   Neurological: She is alert and oriented to person, place, and time.   Psychiatric: She has a normal mood and affect. Her behavior is normal. Judgment and thought content normal.   Vitals reviewed.      GAIT:     []  Normal  [x]  Antalgic    Assistive device: []  None  [x]  Walker     []  Crutches  []  Cane     []  Wheelchair  []  Stretcher    Right Knee Exam     Tenderness   Right knee tenderness location: tender along the inferior aspect of patella especially overlying the 3x4cm mass on anterior aspect of knee.    Range of Motion   Extension: -5   Flexion: 120     Tests   Varus: positive Valgus: positive    Other   Erythema: absent  Scars: " present  Sensation: normal  Pulse: present  Swelling: mild  Effusion: no effusion present      Left Knee Exam     Muscle Strength   The patient has normal left knee strength.    Tenderness   The patient is experiencing no tenderness.     Range of Motion   Extension: 0   Flexion: 120     Tests   Varus: negative Valgus: negative  Drawer:  Anterior - negative         Other   Erythema: absent  Sensation: normal  Pulse: present  Swelling: none  Effusion: no effusion present              No results found.          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  -     Case Request; Standing  -     clindamycin (CLEOCIN) 600 mg in dextrose (D5W) 5 % 100 mL IVPB    Aseptic loosening of prosthetic knee, subsequent encounter  -     Case Request; Standing  -     clindamycin (CLEOCIN) 600 mg in dextrose (D5W) 5 % 100 mL IVPB    Essential hypertension  -     Case Request; Standing  -     clindamycin (CLEOCIN) 600 mg in dextrose (D5W) 5 % 100 mL IVPB    Type 2 diabetes mellitus without complication, without long-term current use of insulin (CMS/[facility])  -     Case Request; Standing  -     clindamycin (CLEOCIN) 600 mg in dextrose (D5W) 5 % 100 mL IVPB    Other orders  -     Follow Anesthesia Guidelines / Standing Orders; Future  -     Follow Anesthesia Guidelines / Standing Orders; Standing  -     Verify NPO Status; Standing  -     Obtain informed consent (if not collected inpatient or PAT); Standing  -     NPO After Midnight          PLAN    Plan excision of loose body in right knee.  Anticipate that the loose body is in prepatellar space.      The patient voiced understanding of the risks, benefits, and alternative  forms of treatment that were discussed and the patient consents to proceed with surgery.  All risks, benefits and alternatives were discussed.  Risks including but not exclusive to anesthetic complications, including death, MI, CVA, infection, bleeding DVT, fracture, residual pain and need for future surgery.    This discussion was held with the patient by Pradeep Jacobs MD and all questions were answered.    We also discussed that she may very well need a full revision surgery and will certainly need TKA on the left knee at some point.  However, at this point, her biggest complaint is the poking feeling in the prepatellar space.  No metallic objects and no bony fragments show up on the CT scan in this area.  She definitely has some palpable rigid loose bodies in this area.      Patient's Body mass index is 21.3 kg/m². BMI is within normal parameters. No follow-up required..    Return for Post-operative eval.    Pradeep Jacobs MD

## 2019-06-29 RX ORDER — CLINDAMYCIN PHOSPHATE 600 MG/50ML
600 INJECTION INTRAVENOUS ONCE
Status: CANCELLED | OUTPATIENT
Start: 2019-07-19 | End: 2019-06-29

## 2019-07-09 ENCOUNTER — TELEPHONE (OUTPATIENT)
Dept: ORTHOPEDIC SURGERY | Facility: CLINIC | Age: 64
End: 2019-07-09

## 2019-07-09 PROBLEM — T84.038D ASEPTIC LOOSENING OF PROSTHETIC KNEE, SUBSEQUENT ENCOUNTER: Status: ACTIVE | Noted: 2019-07-09

## 2019-07-09 PROBLEM — E11.9 TYPE 2 DIABETES MELLITUS WITHOUT COMPLICATION, WITHOUT LONG-TERM CURRENT USE OF INSULIN: Status: ACTIVE | Noted: 2019-07-09

## 2019-07-09 PROBLEM — M23.41 LOOSE BODY IN KNEE, RIGHT KNEE: Status: ACTIVE | Noted: 2019-07-09

## 2019-07-09 PROBLEM — Z96.659 ASEPTIC LOOSENING OF PROSTHETIC KNEE, SUBSEQUENT ENCOUNTER: Status: ACTIVE | Noted: 2019-07-09

## 2019-07-11 ENCOUNTER — OFFICE VISIT (OUTPATIENT)
Dept: FAMILY MEDICINE CLINIC | Facility: CLINIC | Age: 64
End: 2019-07-11

## 2019-07-11 VITALS
SYSTOLIC BLOOD PRESSURE: 112 MMHG | WEIGHT: 121.2 LBS | HEART RATE: 91 BPM | BODY MASS INDEX: 20.19 KG/M2 | DIASTOLIC BLOOD PRESSURE: 56 MMHG | HEIGHT: 65 IN | TEMPERATURE: 98.9 F

## 2019-07-11 DIAGNOSIS — F32.A DEPRESSION, UNSPECIFIED DEPRESSION TYPE: Chronic | ICD-10-CM

## 2019-07-11 DIAGNOSIS — F41.9 ANXIETY: Chronic | ICD-10-CM

## 2019-07-11 DIAGNOSIS — H61.23 CERUMEN DEBRIS ON TYMPANIC MEMBRANE OF BOTH EARS: ICD-10-CM

## 2019-07-11 DIAGNOSIS — Z00.00 INITIAL MEDICARE ANNUAL WELLNESS VISIT: Primary | ICD-10-CM

## 2019-07-11 DIAGNOSIS — E11.9 TYPE 2 DIABETES MELLITUS WITHOUT COMPLICATION, WITHOUT LONG-TERM CURRENT USE OF INSULIN (HCC): Chronic | ICD-10-CM

## 2019-07-11 DIAGNOSIS — E78.5 HYPERLIPIDEMIA, UNSPECIFIED HYPERLIPIDEMIA TYPE: Chronic | ICD-10-CM

## 2019-07-11 DIAGNOSIS — K21.9 GASTROESOPHAGEAL REFLUX DISEASE WITHOUT ESOPHAGITIS: Chronic | ICD-10-CM

## 2019-07-11 DIAGNOSIS — I10 ESSENTIAL HYPERTENSION: Chronic | ICD-10-CM

## 2019-07-11 PROCEDURE — G0438 PPPS, INITIAL VISIT: HCPCS | Performed by: NURSE PRACTITIONER

## 2019-07-11 PROCEDURE — 99214 OFFICE O/P EST MOD 30 MIN: CPT | Performed by: NURSE PRACTITIONER

## 2019-07-11 RX ORDER — FLUTICASONE PROPIONATE 50 MCG
2 SPRAY, SUSPENSION (ML) NASAL DAILY
Qty: 1 BOTTLE | Refills: 2 | Status: SHIPPED | OUTPATIENT
Start: 2019-07-11 | End: 2019-07-15

## 2019-07-11 NOTE — PROGRESS NOTES
Subjective   Odessa Walker is a 64 y.o. female. Patient here today with complaints of Follow-up and Diabetes  Patient here today for recheck of diabetes, hypertension, hyperlipidemia and GERD, reports fingerstick blood sugar fasting running 150s to 180s, Ozempic no longer covered by her insurance, states Trulicity was not covered in the past either but unsure if it is covered currently.  Complains of sinus congestion, ear congestion with decreased hearing bilaterally worse on right than left.  Denies chest pain shortness of breath.  Denies side effects medication.    Vitals:    07/11/19 1302   BP: 112/56   Pulse: 91   Temp: 98.9 °F (37.2 °C)     Past Medical History:   Diagnosis Date   • Benign essential hypertension    • Candidiasis of skin and nail     L foot   • Chest pain    • Chronic anemia    • Claudication (CMS/HCC)    • Corns and callosities    • Depressive disorder    • Dermatitis    • Disease of nail     other specified   • Disorder of peripheral nervous system    • Displaced fracture of third metatarsal bone, right foot, initial encounter for closed fracture    • Displaced fracture of third metatarsal bone, right foot, subsequent encounter for fracture with routine healing    • Diverticular disease of colon    • Dog bite of hand    • Epigastric pain    • Essential hypertension    • Foot pain    • GERD (gastroesophageal reflux disease)    • H/O screening mammography    • Hallux valgus    • Heartburn    • Hyperlipidemia    • Joint pain    • Joint swelling    • Neurologic disorder associated with diabetes mellitus (CMS/HCC)     type 2   • Nondisplaced fracture of fourth metatarsal bone, right foot, initial encounter for closed fracture    • Nondisplaced fracture of fourth metatarsal bone, right foot, subsequent encounter for fracture with routine healing    • Pain in right leg    • Superficial laceration of hand    • Type 2 diabetes mellitus (CMS/HCC)    • Urinary tract infectious disease    • Venous  insufficiency (chronic) (peripheral)      Diabetes   She presents for her follow-up diabetic visit. She has type 2 diabetes mellitus. Her disease course has been stable. There are no hypoglycemic associated symptoms. There are no diabetic associated symptoms. Pertinent negatives for diabetes include no chest pain. There are no hypoglycemic complications. Symptoms are stable. Risk factors for coronary artery disease include hypertension, dyslipidemia, diabetes mellitus, post-menopausal and sedentary lifestyle. Current diabetic treatment includes diet and oral agent (monotherapy) (ozempic inj weekly ). She is compliant with treatment most of the time. She is following a generally healthy diet. Her breakfast blood glucose range is generally 140-180 mg/dl. An ACE inhibitor/angiotensin II receptor blocker is being taken.   Hypertension   This is a chronic problem. The current episode started more than 1 year ago. The problem is unchanged. The problem is controlled. Pertinent negatives include no chest pain, palpitations, peripheral edema or shortness of breath. Risk factors for coronary artery disease include post-menopausal state, dyslipidemia, diabetes mellitus and sedentary lifestyle. Past treatments include lifestyle changes and ACE inhibitors. Current antihypertension treatment includes lifestyle changes and ACE inhibitors. The current treatment provides moderate improvement. Compliance problems include exercise and diet.  There is no history of chronic renal disease.   Hyperlipidemia   This is a chronic problem. The current episode started more than 1 year ago. Exacerbating diseases include diabetes. She has no history of chronic renal disease, hypothyroidism, liver disease, obesity or nephrotic syndrome. Factors aggravating her hyperlipidemia include fatty foods. Pertinent negatives include no chest pain, focal sensory loss, focal weakness, leg pain, myalgias or shortness of breath. Current antihyperlipidemic  treatment includes diet change, exercise and statins. Compliance problems include adherence to exercise, adherence to diet and psychosocial issues.  Risk factors for coronary artery disease include post-menopausal, hypertension, diabetes mellitus and dyslipidemia.        The following portions of the patient's history were reviewed and updated as appropriate: allergies, current medications, past family history, past medical history, past social history, past surgical history and problem list.    Review of Systems   Constitutional: Negative.    HENT: Positive for hearing loss.    Eyes: Negative.    Respiratory: Negative.  Negative for shortness of breath.    Cardiovascular: Negative.  Negative for chest pain and palpitations.   Gastrointestinal: Negative.    Endocrine: Negative.    Genitourinary: Negative.    Musculoskeletal: Negative.  Negative for myalgias.   Skin: Negative.    Allergic/Immunologic: Negative.    Neurological: Negative.  Negative for focal weakness.   Hematological: Negative.    Psychiatric/Behavioral: Negative.        Objective   Physical Exam   Constitutional: She is oriented to person, place, and time. Vital signs are normal. She appears well-developed and well-nourished. No distress.   HENT:   Head: Normocephalic and atraumatic.   Right Ear: Decreased hearing is noted.   Left Ear: Decreased hearing is noted.   Mouth/Throat: Uvula is midline, oropharynx is clear and moist and mucous membranes are normal.   Cerumen in canals noted bilat , R is occluded due to wax   Neck: Normal carotid pulses present. Carotid bruit is not present.   Cardiovascular: Normal rate, regular rhythm and normal heart sounds. Exam reveals no gallop and no friction rub.   No murmur heard.  Pulmonary/Chest: Effort normal and breath sounds normal. No stridor. No respiratory distress. She has no wheezes. She has no rales. She exhibits no tenderness.   Abdominal: Soft. Bowel sounds are normal.   Musculoskeletal: She exhibits no  edema.   Neurological: She is alert and oriented to person, place, and time. She has normal reflexes.   Skin: Skin is warm and dry. No rash noted. She is not diaphoretic. No erythema. No pallor.   Psychiatric: She has a normal mood and affect. Her behavior is normal.   Nursing note and vitals reviewed.      Assessment/Plan   Odessa was seen today for follow-up and diabetes.    Diagnoses and all orders for this visit:    Initial Medicare annual wellness visit    Hyperlipidemia, unspecified hyperlipidemia type  -     Hemoglobin A1c; Future  -     Comprehensive metabolic panel; Future  -     CBC & Differential; Future  -     Urinalysis With Culture If Indicated -; Future  -     Lipid panel; Future    Essential hypertension  -     Hemoglobin A1c; Future  -     Comprehensive metabolic panel; Future  -     CBC & Differential; Future  -     Urinalysis With Culture If Indicated -; Future  -     Lipid panel; Future    Gastroesophageal reflux disease without esophagitis  -     Hemoglobin A1c; Future  -     Comprehensive metabolic panel; Future  -     CBC & Differential; Future  -     Urinalysis With Culture If Indicated -; Future  -     Lipid panel; Future    Type 2 diabetes mellitus without complication, without long-term current use of insulin (CMS/[facility])  -     Hemoglobin A1c; Future  -     Comprehensive metabolic panel; Future  -     CBC & Differential; Future  -     Urinalysis With Culture If Indicated -; Future  -     Lipid panel; Future    Depression, unspecified depression type    Anxiety    Cerumen debris on tympanic membrane of both ears  Comments:  irrigation of bilat ear canals in office, caroline well   Orders:  -     Ear Cerumen Removal    Other orders  -     Dulaglutide (TRULICITY) 0.75 MG/0.5ML solution pen-injector; Inject 0.75 mg under the skin into the appropriate area as directed 1 (One) Time Per Week.  -     fluticasone (FLONASE) 50 MCG/ACT nasal spray; 2 sprays into the nostril(s) as directed by provider  Daily.    labs will be obtained when fasting as above, will inform via phone of results.  She will follow-up in 6 months for recheck or sooner as needed.  I will change from Ozempic to Trulicity since insurance is no longer covering Ozempic.  If Trulicity is also not covered she will need to let me know so that a new medication can be prescribed.  She is aware and is in agreement with this plan.  Flonase is refilled for her today.  Bilateral ear irrigations performed today, a large amount of wax removed from right, moderate amount from left and patient tolerated well.  Reports good hearing, considerable improvement, after irrigation.  Medicare wellness completed today.  All questions and concerns are addressed with understanding noted.

## 2019-07-11 NOTE — PROGRESS NOTES
QUICK REFERENCE INFORMATION:  The ABCs of the Annual Wellness Visit    Initial Medicare Wellness Visit    HEALTH RISK ASSESSMENT    : 1955    Recent Hospitalizations:  No hospitalization(s) within the last year..  ccc      Current Medical Providers:  Patient Care Team:  Kendal Mccoy APRN as PCP - Ge Caban DPM as Consulting Physician (Podiatry)        Smoking Status:  Social History     Tobacco Use   Smoking Status Former Smoker   Smokeless Tobacco Never Used       Alcohol Consumption:  Social History     Substance and Sexual Activity   Alcohol Use No       Depression Screen:   PHQ-2/PHQ-9 Depression Screening 2019   Little interest or pleasure in doing things 0   Feeling down, depressed, or hopeless 0   Total Score 0       Health Habits and Functional and Cognitive Screening:  Functional & Cognitive Status 2019   Do you have difficulty preparing food and eating? No   Do you have difficulty bathing yourself, getting dressed or grooming yourself? No   Do you have difficulty using the toilet? No   Do you have difficulty moving around from place to place? No   Do you have trouble with steps or getting out of a bed or a chair? No   In the past year have you fallen or experienced a near fall? Yes   Current Diet Well Balanced Diet   Dental Exam Not up to date   Eye Exam Not up to date   Exercise (times per week) 0 times per week   Current Exercise Activities Include None   Do you need help using the phone?  No   Are you deaf or do you have serious difficulty hearing?  No   Do you need help with transportation? Yes   Do you need help shopping? No   Do you need help preparing meals?  No   Do you need help with housework?  No   Do you need help with laundry? No   Do you need help taking your medications? No   Do you need help managing money? No   Do you ever drive or ride in a car without wearing a seat belt? Yes   Have you felt unusual stress, anger or loneliness in the last month? Yes    Who do you live with? Alone   If you need help, do you have trouble finding someone available to you? No   Have you been bothered in the last four weeks by sexual problems? No   Do you have difficulty concentrating, remembering or making decisions? Yes           Does the patient have evidence of cognitive impairment? No    Asiprin use counseling: Taking ASA appropriately as indicated      Recent Lab Results:       Lab Results   Component Value Date    HGBA1C 6.7 (H) 03/13/2019     Lab Results   Component Value Date    CHOL 123 (L) 03/13/2019    TRIG 125 03/13/2019    HDL 55 03/13/2019    VLDL 25 03/13/2019    LDLHDL 0.78 03/13/2019           Age-appropriate Screening Schedule:  Refer to the list below for future screening recommendations based on patient's age, sex and/or medical conditions. Orders for these recommended tests are listed in the plan section. The patient has been provided with a written plan.    Health Maintenance   Topic Date Due   • URINE MICROALBUMIN  12/30/2015   • PAP SMEAR  12/06/2016   • ZOSTER VACCINE (3 of 3) 05/17/2017   • DIABETIC EYE EXAM  12/13/2017   • DIABETIC FOOT EXAM  03/20/2018   • PNEUMOCOCCAL VACCINES (65+ LOW/MEDIUM RISK) (1 of 2 - PCV13) 03/13/2019   • INFLUENZA VACCINE  08/01/2019   • HEMOGLOBIN A1C  09/13/2019   • LIPID PANEL  03/13/2020   • MAMMOGRAM  11/07/2020   • COLONOSCOPY  11/11/2025   • TDAP/TD VACCINES (2 - Td) 05/31/2026        Subjective   History of Present Illness    Odessa Walker is a 64 y.o. female who presents for an Annual Wellness Visit.    The following portions of the patient's history were reviewed and updated as appropriate:   She  has a past medical history of Benign essential hypertension, Candidiasis of skin and nail, Chest pain, Chronic anemia, Claudication (CMS/HCC), Corns and callosities, Depressive disorder, Dermatitis, Disease of nail, Disorder of peripheral nervous system, Displaced fracture of third metatarsal bone, right foot, initial  encounter for closed fracture, Displaced fracture of third metatarsal bone, right foot, subsequent encounter for fracture with routine healing, Diverticular disease of colon, Dog bite of hand, Epigastric pain, Essential hypertension, Foot pain, GERD (gastroesophageal reflux disease), H/O screening mammography, Hallux valgus, Heartburn, Hyperlipidemia, Joint pain, Joint swelling, 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  She does not have any pertinent problems on file.  She  has a past surgical history that includes Back surgery (2007); Colonoscopy (11/11/2015); Other surgical history (05/06/2015); Upper gastrointestinal endoscopy (11/11/2015); vaginal hysterectomy salpingo oophorectomy (1999); Knee surgery (2005); Other surgical history (05/06/2015); Other surgical history (10/25/2015); US venous extremity unilateral (01/26/2016); and Oophorectomy.  Her family history includes Diabetes in her other; Heart disease in her mother; Osteoarthritis in her father.  She  reports that she has quit smoking. She has never used smokeless tobacco. She reports that she does not drink alcohol or use drugs.  Current Outpatient Medications   Medication Sig Dispense Refill   • Calcium Carbonate (CALCIUM 600 PO) Take  by mouth.     • glipiZIDE (GLUCOTROL) 10 MG tablet TAKE 1 TABLET BY MOUTH TWICE DAILY BEFORE MEALS 60 tablet 5   • Glucosamine HCl 1000 MG tablet Take  by mouth.     • glucose blood (ONETOUCH VERIO) test strip 1 each by Other route As Needed (diabetes). Use once daily for diabetes DX E11.9 50 each 11   • glucose  monitor monitoring kit 1 each Daily. DX:E11.9 1 each 0   • IRON PO Take 65 mg by mouth.     • Lancets misc Once daily 100 each 12   • lisinopril (PRINIVIL,ZESTRIL) 40 MG tablet TAKE 1/2 TABLET BY MOUTH DAILY 15 tablet 5   • Multiple Vitamins-Minerals (ONE-A-DAY 50 PLUS PO) Take  by mouth.     • omeprazole (priLOSEC) 40 MG capsule TAKE 1 CAPSULE BY MOUTH DAILY 30 capsule 5   • ONE TOUCH LANCETS misc 1 applicator Daily. 200 each 12   • sertraline (ZOLOFT) 50 MG tablet Take 1 tablet by mouth Daily. 60 tablet 5   • simvastatin (ZOCOR) 40 MG tablet Take 1 tablet by mouth every night at bedtime. 30 tablet 5   • sulindac (CLINORIL) 200 MG tablet TAKE 1 TABLET BY MOUTH TWICE DAILY 60 tablet 5   • traMADol (ULTRAM) 50 MG tablet Take 1 tablet by mouth Every 6 (Six) Hours As Needed for Moderate Pain . 20 tablet 0   • Dulaglutide (TRULICITY) 0.75 MG/0.5ML solution pen-injector Inject 0.75 mg under the skin into the appropriate area as directed 1 (One) Time Per Week. 4 pen 2   • fluticasone (FLONASE) 50 MCG/ACT nasal spray 2 sprays into the nostril(s) as directed by provider Daily. 1 bottle 2     No current facility-administered medications for this visit.      Current Outpatient Medications on File Prior to Visit   Medication Sig   • Calcium Carbonate (CALCIUM 600 PO) Take  by mouth.   • glipiZIDE (GLUCOTROL) 10 MG tablet TAKE 1 TABLET BY MOUTH TWICE DAILY BEFORE MEALS   • Glucosamine HCl 1000 MG tablet Take  by mouth.   • glucose blood (ONETOUCH VERIO) test strip 1 each by Other route As Needed (diabetes). Use once daily for diabetes DX E11.9   • glucose monitor monitoring kit 1 each Daily. DX:E11.9   • IRON PO Take 65 mg by mouth.   • Lancets misc Once daily   • lisinopril (PRINIVIL,ZESTRIL) 40 MG tablet TAKE 1/2 TABLET BY MOUTH DAILY   • Multiple Vitamins-Minerals (ONE-A-DAY 50 PLUS PO) Take  by mouth.   • omeprazole (priLOSEC) 40 MG capsule TAKE 1 CAPSULE BY MOUTH DAILY   • ONE TOUCH LANCETS misc 1 applicator Daily.   •  sertraline (ZOLOFT) 50 MG tablet Take 1 tablet by mouth Daily.   • simvastatin (ZOCOR) 40 MG tablet Take 1 tablet by mouth every night at bedtime.   • sulindac (CLINORIL) 200 MG tablet TAKE 1 TABLET BY MOUTH TWICE DAILY   • traMADol (ULTRAM) 50 MG tablet Take 1 tablet by mouth Every 6 (Six) Hours As Needed for Moderate Pain .   • [DISCONTINUED] glipiZIDE (GLUCOTROL) 10 MG tablet TAKE 1 TABLET BY MOUTH TWICE DAILY BEFORE MEALS   • [DISCONTINUED] glipiZIDE (GLUCOTROL) 10 MG tablet TAKE 1 TABLET BY MOUTH TWICE DAILY BEFORE MEALS   • [DISCONTINUED] Semaglutide (OZEMPIC) 1 MG/DOSE solution pen-injector Inject 1 mg under the skin into the appropriate area as directed 1 (One) Time Per Week.     No current facility-administered medications on file prior to visit.      She is allergic to penicillins..    Outpatient Medications Prior to Visit   Medication Sig Dispense Refill   • Calcium Carbonate (CALCIUM 600 PO) Take  by mouth.     • glipiZIDE (GLUCOTROL) 10 MG tablet TAKE 1 TABLET BY MOUTH TWICE DAILY BEFORE MEALS 60 tablet 5   • Glucosamine HCl 1000 MG tablet Take  by mouth.     • glucose blood (ONETOUCH VERIO) test strip 1 each by Other route As Needed (diabetes). Use once daily for diabetes DX E11.9 50 each 11   • glucose monitor monitoring kit 1 each Daily. DX:E11.9 1 each 0   • IRON PO Take 65 mg by mouth.     • Lancets misc Once daily 100 each 12   • lisinopril (PRINIVIL,ZESTRIL) 40 MG tablet TAKE 1/2 TABLET BY MOUTH DAILY 15 tablet 5   • Multiple Vitamins-Minerals (ONE-A-DAY 50 PLUS PO) Take  by mouth.     • omeprazole (priLOSEC) 40 MG capsule TAKE 1 CAPSULE BY MOUTH DAILY 30 capsule 5   • ONE TOUCH LANCETS misc 1 applicator Daily. 200 each 12   • sertraline (ZOLOFT) 50 MG tablet Take 1 tablet by mouth Daily. 60 tablet 5   • simvastatin (ZOCOR) 40 MG tablet Take 1 tablet by mouth every night at bedtime. 30 tablet 5   • sulindac (CLINORIL) 200 MG tablet TAKE 1 TABLET BY MOUTH TWICE DAILY 60 tablet 5   • traMADol  "(ULTRAM) 50 MG tablet Take 1 tablet by mouth Every 6 (Six) Hours As Needed for Moderate Pain . 20 tablet 0   • glipiZIDE (GLUCOTROL) 10 MG tablet TAKE 1 TABLET BY MOUTH TWICE DAILY BEFORE MEALS 60 tablet 5   • glipiZIDE (GLUCOTROL) 10 MG tablet TAKE 1 TABLET BY MOUTH TWICE DAILY BEFORE MEALS 60 tablet 5   • Semaglutide (OZEMPIC) 1 MG/DOSE solution pen-injector Inject 1 mg under the skin into the appropriate area as directed 1 (One) Time Per Week. 1.5 mL 2     No facility-administered medications prior to visit.        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    Review of Systems    Compared to one year ago, the patient feels her physical health is better.  Compared to one year ago, the patient feels her mental health is better.    Objective     Physical Exam    Vitals:    07/11/19 1302   BP: 112/56   Pulse: 91   Temp: 98.9 °F (37.2 °C)   Weight: 55 kg (121 lb 3.2 oz)   Height: 165.1 cm (65\")   PainSc: 0-No pain       Patient's Body mass index is 20.17 kg/m². BMI is within normal parameters. No follow-up required..      Assessment/Plan   Patient Self-Management and Personalized Health Advice  The patient has been provided with information about: " diet, exercise, weight management, fall prevention, designing advance directives and mental health concerns and preventive services including:   · Advance directive, Exercise counseling provided, Fall Risk assessment done, Fall Risk plan of care done, Nutrition counseling provided.    Visit Diagnoses:    ICD-10-CM ICD-9-CM   1. Initial Medicare annual wellness visit Z00.00 V70.0   2. Hyperlipidemia, unspecified hyperlipidemia type E78.5 272.4   3. Essential hypertension I10 401.9   4. Gastroesophageal reflux disease without esophagitis K21.9 530.81   5. Type 2 diabetes mellitus without complication, without long-term current use of insulin (CMS/[facility]) E11.9 250.00   6. Depression, unspecified depression type F32.9 311   7. Anxiety F41.9 300.00   8. Cerumen debris on tympanic membrane of both ears H61.23 380.4       Orders Placed This Encounter   Procedures   • Ear Cerumen Removal   • Hemoglobin A1c     Standing Status:   Future     Standing Expiration Date:   7/11/2020   • Comprehensive metabolic panel     Standing Status:   Future     Standing Expiration Date:   7/11/2020   • Urinalysis With Culture If Indicated -     Standing Status:   Future     Standing Expiration Date:   7/11/2020   • Lipid panel     Standing Status:   Future     Standing Expiration Date:   7/11/2020   • CBC & Differential     Standing Status:   Future     Standing Expiration Date:   9/9/2019     Order Specific Question:   Manual Differential     Answer:   No       Outpatient Encounter Medications as of 7/11/2019   Medication Sig Dispense Refill   • Calcium Carbonate (CALCIUM 600 PO) Take  by mouth.     • glipiZIDE (GLUCOTROL) 10 MG tablet TAKE 1 TABLET BY MOUTH TWICE DAILY BEFORE MEALS 60 tablet 5   • Glucosamine HCl 1000 MG tablet Take  by mouth.     • glucose blood (ONETOUCH VERIO) test strip 1 each by Other route As Needed (diabetes). Use once daily for diabetes DX E11.9 50 each 11   • glucose monitor monitoring kit 1 each Daily. DX:E11.9 1  each 0   • IRON PO Take 65 mg by mouth.     • Lancets misc Once daily 100 each 12   • lisinopril (PRINIVIL,ZESTRIL) 40 MG tablet TAKE 1/2 TABLET BY MOUTH DAILY 15 tablet 5   • Multiple Vitamins-Minerals (ONE-A-DAY 50 PLUS PO) Take  by mouth.     • omeprazole (priLOSEC) 40 MG capsule TAKE 1 CAPSULE BY MOUTH DAILY 30 capsule 5   • ONE TOUCH LANCETS misc 1 applicator Daily. 200 each 12   • sertraline (ZOLOFT) 50 MG tablet Take 1 tablet by mouth Daily. 60 tablet 5   • simvastatin (ZOCOR) 40 MG tablet Take 1 tablet by mouth every night at bedtime. 30 tablet 5   • sulindac (CLINORIL) 200 MG tablet TAKE 1 TABLET BY MOUTH TWICE DAILY 60 tablet 5   • traMADol (ULTRAM) 50 MG tablet Take 1 tablet by mouth Every 6 (Six) Hours As Needed for Moderate Pain . 20 tablet 0   • Dulaglutide (TRULICITY) 0.75 MG/0.5ML solution pen-injector Inject 0.75 mg under the skin into the appropriate area as directed 1 (One) Time Per Week. 4 pen 2   • fluticasone (FLONASE) 50 MCG/ACT nasal spray 2 sprays into the nostril(s) as directed by provider Daily. 1 bottle 2   • [DISCONTINUED] glipiZIDE (GLUCOTROL) 10 MG tablet TAKE 1 TABLET BY MOUTH TWICE DAILY BEFORE MEALS 60 tablet 5   • [DISCONTINUED] glipiZIDE (GLUCOTROL) 10 MG tablet TAKE 1 TABLET BY MOUTH TWICE DAILY BEFORE MEALS 60 tablet 5   • [DISCONTINUED] Semaglutide (OZEMPIC) 1 MG/DOSE solution pen-injector Inject 1 mg under the skin into the appropriate area as directed 1 (One) Time Per Week. 1.5 mL 2     No facility-administered encounter medications on file as of 7/11/2019.        Reviewed use of high risk medication in the elderly: yes  Reviewed for potential of harmful drug interactions in the elderly: yes    Follow Up:  Return in about 6 months (around 1/11/2020), or if symptoms worsen or fail to improve, for or sooner as needed, Next scheduled follow up.     An After Visit Summary and PPPS with all of these plans were given to the patient.

## 2019-07-15 ENCOUNTER — APPOINTMENT (OUTPATIENT)
Dept: PREADMISSION TESTING | Facility: HOSPITAL | Age: 64
End: 2019-07-15

## 2019-07-15 VITALS
OXYGEN SATURATION: 98 % | SYSTOLIC BLOOD PRESSURE: 109 MMHG | RESPIRATION RATE: 12 BRPM | DIASTOLIC BLOOD PRESSURE: 56 MMHG | HEART RATE: 59 BPM | BODY MASS INDEX: 20.16 KG/M2 | HEIGHT: 65 IN | WEIGHT: 121 LBS

## 2019-07-15 LAB
ANION GAP SERPL CALCULATED.3IONS-SCNC: 12 MMOL/L (ref 5–15)
BUN BLD-MCNC: 41 MG/DL (ref 8–23)
BUN/CREAT SERPL: 33.6 (ref 7–25)
CALCIUM SPEC-SCNC: 9.8 MG/DL (ref 8.6–10.5)
CHLORIDE SERPL-SCNC: 104 MMOL/L (ref 98–107)
CO2 SERPL-SCNC: 26 MMOL/L (ref 22–29)
CREAT BLD-MCNC: 1.22 MG/DL (ref 0.57–1)
GFR SERPL CREATININE-BSD FRML MDRD: 44 ML/MIN/1.73
GLUCOSE BLD-MCNC: 198 MG/DL (ref 65–99)
POTASSIUM BLD-SCNC: 4.7 MMOL/L (ref 3.5–5.2)
SODIUM BLD-SCNC: 142 MMOL/L (ref 136–145)

## 2019-07-15 PROCEDURE — 93010 ELECTROCARDIOGRAM REPORT: CPT | Performed by: INTERNAL MEDICINE

## 2019-07-15 PROCEDURE — 80048 BASIC METABOLIC PNL TOTAL CA: CPT | Performed by: ANESTHESIOLOGY

## 2019-07-15 PROCEDURE — 93005 ELECTROCARDIOGRAM TRACING: CPT

## 2019-07-15 PROCEDURE — 36415 COLL VENOUS BLD VENIPUNCTURE: CPT

## 2019-07-15 RX ORDER — GLIPIZIDE 10 MG/1
10 TABLET ORAL
COMMUNITY
End: 2020-03-12 | Stop reason: SDUPTHER

## 2019-07-15 RX ORDER — LISINOPRIL 40 MG/1
20 TABLET ORAL DAILY
COMMUNITY
End: 2020-05-12

## 2019-07-15 RX ORDER — SODIUM CHLORIDE 9 MG/ML
1000 INJECTION, SOLUTION INTRAVENOUS CONTINUOUS
Status: CANCELLED | OUTPATIENT
Start: 2019-07-19

## 2019-07-15 RX ORDER — SULINDAC 200 MG/1
200 TABLET ORAL 2 TIMES DAILY
COMMUNITY
End: 2021-11-22

## 2019-07-15 RX ORDER — FLUTICASONE PROPIONATE 50 MCG
2 SPRAY, SUSPENSION (ML) NASAL DAILY PRN
COMMUNITY
End: 2020-02-18

## 2019-07-15 NOTE — PAT
Dr. Alexander here to review EKG and talk with patient. Chlorhexidine wash and instructions given, patient voiced understanding.

## 2019-07-15 NOTE — DISCHARGE INSTRUCTIONS
[facility]  Pre-op Information and Guidelines    You will be called after 2 p.m. the day before your surgery (Friday for Monday surgery) and notified of your time for arrival and approximate surgery time.  If you have not received a call by 4P.M., please contact Same Day Surgery at [phone] of if outside [facility] call [phone].    Please Follow these Important Safety Guidelines:    • The morning of your procedure, take only the medications listed below with   A sip of water:_____________________________________________       _____OMEPRAZOLE, SERTRALINE______________    • DO NOT eat or drink anything after 12:00 midnight the night before surgery  Specific instructions concerning drinking clear liquids will be discussed during  the pre-surgery instruction call the day before your surgery.    • If you take a blood thinner (ex. Plavix, Coumadin, aspirin), ask your doctor when to stop it before surgery  STOP DATE: _________________    • Only 2 visitors are allowed in patient rooms at a time  Your visitors will be asked to wait in the lobby until the admission process is complete with the exception of a parent with a child and patients in need of special assistance.    • YOU CANNOT DRIVE YOURSELF HOME  You must be accompanied by someone who will be responsible for driving you home after surgery and for your care at home.    • DO NOT chew gum, use breath mints, hard candy, or smoke the day of surgery  • DO NOT drink alcohol for at least 24 hours before your surgery  • DO NOT wear any jewelry and remove all body piercing before coming to the hospital  • DO NOT wear make-up to the hospital  • If you are having surgery on an extremity (arm/leg/foot) remove nail polish/artificial nails on the surgical side  • Clothing, glasses, contacts, dentures, and hairpieces must be removed before surgery  • Bathe the night before or the morning of your surgery and do not use powders/lotions on  skin.

## 2019-07-17 ENCOUNTER — LAB (OUTPATIENT)
Dept: LAB | Facility: OTHER | Age: 64
End: 2019-07-17

## 2019-07-17 DIAGNOSIS — E78.5 HYPERLIPIDEMIA, UNSPECIFIED HYPERLIPIDEMIA TYPE: ICD-10-CM

## 2019-07-17 DIAGNOSIS — I10 ESSENTIAL HYPERTENSION: ICD-10-CM

## 2019-07-17 DIAGNOSIS — K21.9 GASTROESOPHAGEAL REFLUX DISEASE WITHOUT ESOPHAGITIS: ICD-10-CM

## 2019-07-17 DIAGNOSIS — E11.9 TYPE 2 DIABETES MELLITUS WITHOUT COMPLICATION, WITHOUT LONG-TERM CURRENT USE OF INSULIN (HCC): ICD-10-CM

## 2019-07-17 LAB
ALBUMIN SERPL-MCNC: 4.4 G/DL (ref 3.5–5)
ALBUMIN/GLOB SERPL: 1.6 G/DL (ref 1.1–1.8)
ALP SERPL-CCNC: 45 U/L (ref 38–126)
ALT SERPL W P-5'-P-CCNC: 20 U/L
ANION GAP SERPL CALCULATED.3IONS-SCNC: 10 MMOL/L (ref 5–15)
AST SERPL-CCNC: 26 U/L (ref 14–36)
BACTERIA UR QL AUTO: ABNORMAL /HPF
BASOPHILS # BLD AUTO: 0.02 10*3/MM3 (ref 0–0.2)
BASOPHILS NFR BLD AUTO: 0.3 % (ref 0–1.5)
BILIRUB SERPL-MCNC: 0.4 MG/DL (ref 0.2–1.3)
BILIRUB UR QL STRIP: NEGATIVE
BUN BLD-MCNC: 43 MG/DL (ref 7–23)
BUN/CREAT SERPL: 35.8 (ref 7–25)
CALCIUM SPEC-SCNC: 10.2 MG/DL (ref 8.4–10.2)
CHLORIDE SERPL-SCNC: 106 MMOL/L (ref 101–112)
CHOLEST SERPL-MCNC: 135 MG/DL (ref 150–200)
CLARITY UR: CLEAR
CO2 SERPL-SCNC: 27 MMOL/L (ref 22–30)
COLOR UR: YELLOW
CREAT BLD-MCNC: 1.2 MG/DL (ref 0.52–1.04)
DEPRECATED RDW RBC AUTO: 42.6 FL (ref 37–54)
EOSINOPHIL # BLD AUTO: 0.1 10*3/MM3 (ref 0–0.4)
EOSINOPHIL NFR BLD AUTO: 1.6 % (ref 0.3–6.2)
ERYTHROCYTE [DISTWIDTH] IN BLOOD BY AUTOMATED COUNT: 12.7 % (ref 12.3–15.4)
GFR SERPL CREATININE-BSD FRML MDRD: 45 ML/MIN/1.73 (ref 45–104)
GLOBULIN UR ELPH-MCNC: 2.8 GM/DL (ref 2.3–3.5)
GLUCOSE BLD-MCNC: 178 MG/DL (ref 70–99)
GLUCOSE UR STRIP-MCNC: NEGATIVE MG/DL
HCT VFR BLD AUTO: 35.5 % (ref 34–46.6)
HDLC SERPL-MCNC: 51 MG/DL (ref 40–59)
HGB BLD-MCNC: 11.9 G/DL (ref 12–15.9)
HGB UR QL STRIP.AUTO: NEGATIVE
HYALINE CASTS UR QL AUTO: ABNORMAL /LPF
KETONES UR QL STRIP: NEGATIVE
LDLC SERPL CALC-MCNC: 66 MG/DL
LDLC/HDLC SERPL: 1.3 {RATIO} (ref 0–3.22)
LEUKOCYTE ESTERASE UR QL STRIP.AUTO: ABNORMAL
LYMPHOCYTES # BLD AUTO: 1.45 10*3/MM3 (ref 0.7–3.1)
LYMPHOCYTES NFR BLD AUTO: 23.3 % (ref 19.6–45.3)
MCH RBC QN AUTO: 32.1 PG (ref 26.6–33)
MCHC RBC AUTO-ENTMCNC: 33.5 G/DL (ref 31.5–35.7)
MCV RBC AUTO: 95.7 FL (ref 79–97)
MONOCYTES # BLD AUTO: 0.53 10*3/MM3 (ref 0.1–0.9)
MONOCYTES NFR BLD AUTO: 8.5 % (ref 5–12)
NEUTROPHILS # BLD AUTO: 4.13 10*3/MM3 (ref 1.7–7)
NEUTROPHILS NFR BLD AUTO: 66.3 % (ref 42.7–76)
NITRITE UR QL STRIP: NEGATIVE
PH UR STRIP.AUTO: <=5 [PH] (ref 5.5–8)
PLATELET # BLD AUTO: 192 10*3/MM3 (ref 140–450)
PMV BLD AUTO: 9.4 FL (ref 6–12)
POTASSIUM BLD-SCNC: 4.7 MMOL/L (ref 3.4–5)
PROT SERPL-MCNC: 7.2 G/DL (ref 6.3–8.6)
PROT UR QL STRIP: NEGATIVE
RBC # BLD AUTO: 3.71 10*6/MM3 (ref 3.77–5.28)
RBC # UR: ABNORMAL /HPF
REF LAB TEST METHOD: ABNORMAL
SODIUM BLD-SCNC: 143 MMOL/L (ref 137–145)
SP GR UR STRIP: 1.01 (ref 1–1.03)
SQUAMOUS #/AREA URNS HPF: ABNORMAL /HPF
TRIGL SERPL-MCNC: 88 MG/DL
UROBILINOGEN UR QL STRIP: ABNORMAL
VLDLC SERPL-MCNC: 17.6 MG/DL
WBC NRBC COR # BLD: 6.23 10*3/MM3 (ref 3.4–10.8)
WBC UR QL AUTO: ABNORMAL /HPF

## 2019-07-17 PROCEDURE — 80061 LIPID PANEL: CPT | Performed by: NURSE PRACTITIONER

## 2019-07-17 PROCEDURE — 83036 HEMOGLOBIN GLYCOSYLATED A1C: CPT | Performed by: NURSE PRACTITIONER

## 2019-07-17 PROCEDURE — 81001 URINALYSIS AUTO W/SCOPE: CPT | Performed by: NURSE PRACTITIONER

## 2019-07-17 PROCEDURE — 80053 COMPREHEN METABOLIC PANEL: CPT | Performed by: NURSE PRACTITIONER

## 2019-07-17 PROCEDURE — 85025 COMPLETE CBC W/AUTO DIFF WBC: CPT | Performed by: NURSE PRACTITIONER

## 2019-07-17 PROCEDURE — 36415 COLL VENOUS BLD VENIPUNCTURE: CPT | Performed by: NURSE PRACTITIONER

## 2019-07-17 PROCEDURE — 87086 URINE CULTURE/COLONY COUNT: CPT | Performed by: NURSE PRACTITIONER

## 2019-07-18 LAB
BACTERIA SPEC AEROBE CULT: NORMAL
HBA1C MFR BLD: 7.2 % (ref 4.8–5.6)

## 2019-07-19 ENCOUNTER — ANESTHESIA EVENT (OUTPATIENT)
Dept: PERIOP | Facility: HOSPITAL | Age: 64
End: 2019-07-19

## 2019-07-19 ENCOUNTER — ANESTHESIA (OUTPATIENT)
Dept: PERIOP | Facility: HOSPITAL | Age: 64
End: 2019-07-19

## 2019-07-19 ENCOUNTER — HOSPITAL ENCOUNTER (OUTPATIENT)
Facility: HOSPITAL | Age: 64
Setting detail: HOSPITAL OUTPATIENT SURGERY
Discharge: HOME | End: 2019-07-19
Attending: ORTHOPAEDIC SURGERY | Admitting: ORTHOPAEDIC SURGERY

## 2019-07-19 VITALS
WEIGHT: 118.83 LBS | TEMPERATURE: 97.8 F | SYSTOLIC BLOOD PRESSURE: 109 MMHG | DIASTOLIC BLOOD PRESSURE: 51 MMHG | RESPIRATION RATE: 18 BRPM | OXYGEN SATURATION: 99 % | HEIGHT: 65 IN | HEART RATE: 61 BPM | BODY MASS INDEX: 19.8 KG/M2

## 2019-07-19 DIAGNOSIS — Z96.659 ASEPTIC LOOSENING OF PROSTHETIC KNEE, SUBSEQUENT ENCOUNTER: ICD-10-CM

## 2019-07-19 DIAGNOSIS — M25.561 CHRONIC PAIN OF RIGHT KNEE: ICD-10-CM

## 2019-07-19 DIAGNOSIS — I10 ESSENTIAL HYPERTENSION: ICD-10-CM

## 2019-07-19 DIAGNOSIS — Z96.651 PRESENCE OF TOTAL RIGHT KNEE JOINT PROSTHESIS: ICD-10-CM

## 2019-07-19 DIAGNOSIS — E11.9 TYPE 2 DIABETES MELLITUS WITHOUT COMPLICATION, WITHOUT LONG-TERM CURRENT USE OF INSULIN (HCC): ICD-10-CM

## 2019-07-19 DIAGNOSIS — G89.29 CHRONIC PAIN OF RIGHT KNEE: ICD-10-CM

## 2019-07-19 DIAGNOSIS — T84.038D ASEPTIC LOOSENING OF PROSTHETIC KNEE, SUBSEQUENT ENCOUNTER: ICD-10-CM

## 2019-07-19 DIAGNOSIS — M23.41 LOOSE BODY IN KNEE, RIGHT KNEE: ICD-10-CM

## 2019-07-19 LAB — GLUCOSE BLDC GLUCOMTR-MCNC: 155 MG/DL (ref 70–130)

## 2019-07-19 PROCEDURE — 82962 GLUCOSE BLOOD TEST: CPT

## 2019-07-19 PROCEDURE — 25010000002 FENTANYL CITRATE (PF) 100 MCG/2ML SOLUTION: Performed by: NURSE ANESTHETIST, CERTIFIED REGISTERED

## 2019-07-19 PROCEDURE — 25010000002 PROPOFOL 10 MG/ML EMULSION

## 2019-07-19 PROCEDURE — 27331 EXPLORE/TREAT KNEE JOINT: CPT | Performed by: ORTHOPAEDIC SURGERY

## 2019-07-19 RX ORDER — SODIUM CHLORIDE 9 MG/ML
1000 INJECTION, SOLUTION INTRAVENOUS CONTINUOUS
Status: DISCONTINUED | OUTPATIENT
Start: 2019-07-19 | End: 2019-07-19 | Stop reason: HOSPADM

## 2019-07-19 RX ORDER — FENTANYL CITRATE 50 UG/ML
INJECTION, SOLUTION INTRAMUSCULAR; INTRAVENOUS AS NEEDED
Status: DISCONTINUED | OUTPATIENT
Start: 2019-07-19 | End: 2019-07-19 | Stop reason: SURG

## 2019-07-19 RX ORDER — 0.9 % SODIUM CHLORIDE 0.9 %
VIAL (ML) INJECTION AS NEEDED
Status: DISCONTINUED | OUTPATIENT
Start: 2019-07-19 | End: 2019-07-19 | Stop reason: HOSPADM

## 2019-07-19 RX ORDER — BUPIVACAINE HYDROCHLORIDE 2.5 MG/ML
INJECTION, SOLUTION EPIDURAL; INFILTRATION; INTRACAUDAL AS NEEDED
Status: DISCONTINUED | OUTPATIENT
Start: 2019-07-19 | End: 2019-07-19 | Stop reason: HOSPADM

## 2019-07-19 RX ORDER — CLINDAMYCIN PHOSPHATE 600 MG/50ML
600 INJECTION INTRAVENOUS ONCE
Status: COMPLETED | OUTPATIENT
Start: 2019-07-19 | End: 2019-07-19

## 2019-07-19 RX ORDER — HYDROCODONE BITARTRATE AND ACETAMINOPHEN 7.5; 325 MG/1; MG/1
1 TABLET ORAL EVERY 4 HOURS PRN
Qty: 10 TABLET | Refills: 0 | Status: SHIPPED | OUTPATIENT
Start: 2019-07-19 | End: 2020-03-12

## 2019-07-19 RX ORDER — PROPOFOL 10 MG/ML
VIAL (ML) INTRAVENOUS AS NEEDED
Status: DISCONTINUED | OUTPATIENT
Start: 2019-07-19 | End: 2019-07-19 | Stop reason: SURG

## 2019-07-19 RX ORDER — BACITRACIN 50000 [IU]/1
INJECTION, POWDER, FOR SOLUTION INTRAMUSCULAR AS NEEDED
Status: DISCONTINUED | OUTPATIENT
Start: 2019-07-19 | End: 2019-07-19 | Stop reason: HOSPADM

## 2019-07-19 RX ADMIN — PROPOFOL 50 MG: 10 INJECTION, EMULSION INTRAVENOUS at 11:44

## 2019-07-19 RX ADMIN — PROPOFOL 10 MG: 10 INJECTION, EMULSION INTRAVENOUS at 11:54

## 2019-07-19 RX ADMIN — PROPOFOL 20 MG: 10 INJECTION, EMULSION INTRAVENOUS at 11:57

## 2019-07-19 RX ADMIN — PROPOFOL 10 MG: 10 INJECTION, EMULSION INTRAVENOUS at 11:50

## 2019-07-19 RX ADMIN — PROPOFOL 20 MG: 10 INJECTION, EMULSION INTRAVENOUS at 11:47

## 2019-07-19 RX ADMIN — CLINDAMYCIN IN 5 PERCENT DEXTROSE 600 MG: 12 INJECTION, SOLUTION INTRAVENOUS at 11:46

## 2019-07-19 RX ADMIN — SODIUM CHLORIDE 1000 ML: 9 INJECTION, SOLUTION INTRAVENOUS at 10:34

## 2019-07-19 RX ADMIN — FENTANYL CITRATE 25 MCG: 50 INJECTION, SOLUTION INTRAMUSCULAR; INTRAVENOUS at 11:49

## 2019-07-19 NOTE — ANESTHESIA PREPROCEDURE EVALUATION
Anesthesia Evaluation     no history of anesthetic complications:  NPO Solid Status: > 8 hours  NPO Liquid Status: > 2 hours           Airway   Mallampati: II  TM distance: >3 FB  Neck ROM: full  Possible difficult intubation  Dental    (+) poor dentition    Pulmonary - negative pulmonary ROS and normal exam    breath sounds clear to auscultation  (-) COPD, asthma, sleep apnea, not a smoker  Cardiovascular - normal exam  Exercise tolerance: good (4-7 METS)    ECG reviewed  Rhythm: regular  Rate: normal    (+) hypertension well controlled, hyperlipidemia,   (-) valvular problems/murmurs, dysrhythmias, angina, cardiac stents, DVT    ROS comment: Normal sinus rhythm  Left axis deviation  Left anterior fascicular block  Abnormal ECG  No previous ECGs available  [facility]      Neuro/Psych  (+) headaches (occ), psychiatric history Depression and Anxiety,     (-) seizures, TIA, CVA  GI/Hepatic/Renal/Endo    (+)  GERD well controlled,  renal disease CRI, diabetes mellitus (glu 155) type 2 well controlled,   (-) hepatitis, liver disease, hypothyroidism    Musculoskeletal     (+) arthralgias, back pain,       ROS comment: osteoporosis  Abdominal    Substance History   (-) alcohol use, drug use     OB/GYN          Other   (+) arthritis     (-) history of cancer                  Anesthesia Plan    ASA 3     general     intravenous induction   Anesthetic plan, all risks, benefits, and alternatives have been provided, discussed and informed consent has been obtained with: patient and sibling.

## 2019-07-19 NOTE — DISCHARGE INSTRUCTIONS
Minor Surgery  Outpatient Instructions    General Information  You have had a minor surgical procedure and are not expected to require extensive treatment or a long recovery period.  However, the following information/instructions that are listed serve as guidelines to help you recover at home.    Activity:  As tolerated    Hygiene:  May remove dressing and shower in 2 days. Do not submerge wound in bathtub.     Dressing/Wound Care:  Remove dressing in 2 days. May shower and wash with soap and water at that time. No tub baths, swimming or hot tubs until wound is completely healed.     Diet:  As tolerated    Important Points:  -Call your doctor to report any of the following:   -unusual or excessive bleeding/drainage   -pain not reduced/controlled by medication   -elevated temperature consistently greater that 100 degrees F   -increased swelling, redness, bruising, or tenderness in surgical area  -Take medications as prescribed by your physician  -If you have any questions, please contact your doctor or the Same Day Surgery Unit at   [phone]  -If a post-operative emergency occurs, report to your nearest ER

## 2019-07-19 NOTE — ANESTHESIA POSTPROCEDURE EVALUATION
Patient: Odessa Walker    Procedure Summary     Date:  07/19/19 Room / Location:  [facility] OR  / [facility] OR    Anesthesia Start:  1142 Anesthesia Stop:  1207    Procedure:  excision of loose body in right knee. 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      Aseptic loosening of prosthetic knee, subsequent encounter      Essential hypertension      Type 2 diabetes mellitus without complication, without long-term current use of insulin (CMS/HCC)      Loose body in knee, right knee      (Chronic pain of right knee [M25.561, G89.29])      (Presence of total right knee joint prosthesis [Z96.651])      (Aseptic loosening of prosthetic knee, subsequent encounter [T84.038D, Z96.659])      (Essential hypertension [I10])      (Type 2 diabetes mellitus without complication, without long-term current use of insulin (CMS/HCC) [E11.9])      (Loose body in knee, right knee [M23.41])    Surgeon:  Pradeep Jacobs MD Provider:  Diilp Palafox MD    Anesthesia Type:  general ASA Status:  3          Anesthesia Type: general  Last vitals  BP   121/57 (07/19/19 1027)   Temp   98.9 °F (37.2 °C) (07/19/19 1027)   Pulse   66 (07/19/19 1027)   Resp   18 (07/19/19 1027)     SpO2   98 % (07/19/19 1027)     Post Anesthesia Care and Evaluation    Patient location during evaluation: bedside  Patient participation: complete - patient participated  Level of consciousness: awake and awake and alert  Pain score: 0  Pain management: satisfactory to patient  Airway patency: patent  Anesthetic complications: No anesthetic complications  PONV Status: none  Cardiovascular status: acceptable and stable  Respiratory status: acceptable, room air, unassisted and spontaneous ventilation  Hydration status: acceptable  Post Neuraxial Block status: Motor and sensory function returned to baseline

## 2019-07-19 NOTE — OP NOTE
HARDWARE REMOVAL  Procedure Note    Name:    Odessa Walker  YOB: 1955  Date of surgery:   7/19/2019    Pre-op Diagnosis:   Chronic pain of right knee [M25.561, G89.29]  Presence of total right knee joint prosthesis [Z96.651]  Aseptic loosening of prosthetic knee, subsequent encounter [T84.038D, Z96.659]  Essential hypertension [I10]  Type 2 diabetes mellitus without complication, without long-term current use of insulin (CMS/HCC) [E11.9]  Loose body in knee, right knee [M23.41]    Post-op Diagnosis:    Post-Op Diagnosis Codes:     * Chronic pain of right knee [M25.561, G89.29]     * Presence of total right knee joint prosthesis [Z96.651]     * Aseptic loosening of prosthetic knee, subsequent encounter [T84.038D, Z96.659]     * Essential hypertension [I10]     * Type 2 diabetes mellitus without complication, without long-term current use of insulin (CMS/HCC) [E11.9]     * Loose body in knee, right knee [M23.41]    Procedure:  Procedure(s):  excision of loose body in right knee.    Surgeon:  Surgeon(s):  Pradeep Jacobs MD    ASSISTANT:  Dia Castillo CSA    Anesthesia: Choice    Staff:   Circulator: Jennifer Foy RN  Scrub Person: Larry Blakely  Assistant: Dia Castillo CSA    Estimated Blood Loss: minimal    Specimens:                None      Drains:  None    Findings:  As below    Complications: None    IMPLANTS:   Nothing was implanted during the procedure      PROCEDURE:    Once consent was obtained the patient was taken to the operating room and once adequate anesthesia was obtained the right lower extremity was prepped and draped in standard surgical fashion.  Pneumatic tourniquet was applied and inflated to 300 mmHg.  An incision was then made over the anterior aspect of the knee in line with the previous incision.  Dissection was carried down into the subcutaneous tissue in the prepatellar space.  There was a piece of plastic measuring approximately 2-1/2 cm x 1 cm  in this space.  It was dissected free of the soft tissue and removed.  The wound was probed and no tract was identified leading out of the space.  It was completely contained with a soft tissue envelope.  This wound was then copiously irrigated with bacitracin saline.  The wound was closed with 2-0 Vicryl and with nylon sutures.  A dry bulky dressing was applied.  Patient was then awakened and taken to same-day surgery in good condition.  She tolerated the procedure very well.    Pradeep Jacobs MD     Date: 7/19/2019  Time: 2:44 PM

## 2019-07-19 NOTE — BRIEF OP NOTE
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) [E11.9]  Loose body in knee, right knee [M23.41]       Post-Op Diagnosis Codes:     * Chronic pain of right knee [M25.561, G89.29]     * Presence of total right knee joint prosthesis [Z96.651]     * Aseptic loosening of prosthetic knee, subsequent encounter [T84.038D, Z96.659]     * Essential hypertension [I10]     * Type 2 diabetes mellitus without complication, without long-term current use of insulin (CMS/HCC) [E11.9]     * Loose body in knee, right knee [M23.41]    Procedure/CPT® Codes:      Procedure(s):  excision of loose body in right knee.    Surgeon(s):  Pradeep Jacobs MD    Anesthesia: Choice    Staff:   Circulator: Jennifer Foy RN  Scrub Person: Larry Blakely  Assistant: Dia Castillo CSA    Estimated Blood Loss: minimal    Urine Voided: * No values recorded between 7/19/2019 11:43 AM and 7/19/2019 12:08 PM *    Specimens:                None          Drains:  None    Findings: oblong piece of plastic about 2.5cm in length and 1cm in width at largest dimension.    Complications: none      Pradeep Jacobs MD     Date: 7/19/2019  Time: 12:10 PM

## 2019-08-02 ENCOUNTER — OFFICE VISIT (OUTPATIENT)
Dept: ORTHOPEDIC SURGERY | Facility: CLINIC | Age: 64
End: 2019-08-02

## 2019-08-02 VITALS — BODY MASS INDEX: 20.49 KG/M2 | HEIGHT: 65 IN | WEIGHT: 123 LBS

## 2019-08-02 DIAGNOSIS — G89.29 CHRONIC PAIN OF RIGHT KNEE: Primary | ICD-10-CM

## 2019-08-02 DIAGNOSIS — Z96.659 ASEPTIC LOOSENING OF PROSTHETIC KNEE, SUBSEQUENT ENCOUNTER: ICD-10-CM

## 2019-08-02 DIAGNOSIS — T84.038D ASEPTIC LOOSENING OF PROSTHETIC KNEE, SUBSEQUENT ENCOUNTER: ICD-10-CM

## 2019-08-02 DIAGNOSIS — M25.561 CHRONIC PAIN OF RIGHT KNEE: Primary | ICD-10-CM

## 2019-08-02 DIAGNOSIS — Z96.651 PRESENCE OF TOTAL RIGHT KNEE JOINT PROSTHESIS: ICD-10-CM

## 2019-08-02 PROCEDURE — 99024 POSTOP FOLLOW-UP VISIT: CPT | Performed by: ORTHOPAEDIC SURGERY

## 2019-08-02 NOTE — PROGRESS NOTES
Odessa Walker is a 64 y.o. female is s/p       Chief Complaint   Patient presents with   • Right Knee - Post-op, Follow-up       HISTORY OF PRESENT ILLNESS:  Postop right knee, patient states pain score is at a 0 today, she was operated by Dr. Jacobs 2 weeks ago.  She had a piece of plastic removed from her knee and the knee is otherwise fairly nonpainful although there is some progressive deformity from likely plastic wear.  Uncertain how she got here but according the note was told to come in today.  About a week postop she hit her knee on a piece of outdoor furniture and had a lot of bleeding has been told to use bacteriostatic soap since then.  She has had no drainage in over a week.       Allergies   Allergen Reactions   • Penicillins Rash         Current Outpatient Medications:   •  Calcium Carbonate (CALCIUM 600 PO), Take 1 tablet by mouth Daily., Disp: , Rfl:   •  Dulaglutide (TRULICITY) 0.75 MG/0.5ML solution pen-injector, Inject 0.75 mg under the skin into the appropriate area as directed 1 (One) Time Per Week., Disp: 4 pen, Rfl: 2  •  Flax Oil-Fish Oil-Borage Oil (FISH OIL-FLAX OIL-BORAGE OIL PO), Take 1 tablet by mouth 2 (Two) Times a Day., Disp: , Rfl:   •  fluticasone (FLONASE) 50 MCG/ACT nasal spray, 2 sprays into the nostril(s) as directed by provider Daily As Needed for Rhinitis., Disp: , Rfl:   •  glipiZIDE (GLUCOTROL) 10 MG tablet, Take 10 mg by mouth 2 (Two) Times a Day Before Meals., Disp: , Rfl:   •  glucose blood (ONETOUCH VERIO) test strip, 1 each by Other route As Needed (diabetes). Use once daily for diabetes DX E11.9, Disp: 50 each, Rfl: 11  •  glucose monitor monitoring kit, 1 each Daily. DX:E11.9, Disp: 1 each, Rfl: 0  •  IRON PO, Take 65 mg by mouth Daily., Disp: , Rfl:   •  Lancets misc, Once daily, Disp: 100 each, Rfl: 12  •  lisinopril (PRINIVIL,ZESTRIL) 40 MG tablet, Take 20 mg by mouth Daily., Disp: , Rfl:   •  Multiple Vitamins-Minerals (ONE-A-DAY 50 PLUS PO), Take 1 tablet by  mouth Daily., Disp: , Rfl:   •  omeprazole (priLOSEC) 40 MG capsule, TAKE 1 CAPSULE BY MOUTH DAILY, Disp: 30 capsule, Rfl: 5  •  ONE TOUCH LANCETS misc, 1 applicator Daily., Disp: 200 each, Rfl: 12  •  sertraline (ZOLOFT) 50 MG tablet, Take 1 tablet by mouth Daily., Disp: 60 tablet, Rfl: 5  •  simvastatin (ZOCOR) 40 MG tablet, Take 1 tablet by mouth every night at bedtime., Disp: 30 tablet, Rfl: 5  •  sulindac (CLINORIL) 200 MG tablet, Take 200 mg by mouth 2 (Two) Times a Day., Disp: , Rfl:   •  HYDROcodone-acetaminophen (NORCO) 7.5-325 MG per tablet, Take 1 tablet by mouth Every 4 (Four) Hours As Needed for Moderate Pain., Disp: 10 tablet, Rfl: 0    No fevers or chills.  No nausea or vomiting.      PHYSICAL EXAMINATION:       Odessa Walker is a 64 y.o. female    Patient is awake and alert, answers questions appropriately and is in no apparent distress.    GAIT:     []  Normal  []  Antalgic    Assistive device: [x]  None  []  Walker     []  Crutches  []  Cane     []  Wheelchair  []  Stretcher    Ortho Exam  Dryness intact with only mild erythema there is no induration no sign of infection.  There is a little fluid under here about the size 3 cm round.  Neurovascular intact calf is negative    No results found.        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  Mild compressing to the leg.  Wash with soap and water any drainage or redness or notify us immediately I think otherwise she can follow-up with Dr. Jacobs as needed    No Follow-up on file.    Chris Davies MD

## 2019-08-09 ENCOUNTER — TRANSCRIBE ORDERS (OUTPATIENT)
Dept: GENERAL RADIOLOGY | Facility: CLINIC | Age: 64
End: 2019-08-09

## 2019-08-09 DIAGNOSIS — N17.9 ACUTE RENAL FAILURE, UNSPECIFIED ACUTE RENAL FAILURE TYPE (HCC): Primary | ICD-10-CM

## 2019-08-09 DIAGNOSIS — K21.9 GASTRIC REFLUX SYNDROME: ICD-10-CM

## 2019-08-09 DIAGNOSIS — E11.9 DIABETES MELLITUS WITHOUT COMPLICATION (HCC): ICD-10-CM

## 2019-08-09 DIAGNOSIS — N18.2 CHRONIC KIDNEY DISEASE, STAGE II (MILD): ICD-10-CM

## 2019-08-09 DIAGNOSIS — Z86.59 H/O: DEPRESSION: ICD-10-CM

## 2019-08-09 DIAGNOSIS — E78.49 OTHER HYPERLIPIDEMIA: ICD-10-CM

## 2019-08-16 ENCOUNTER — LAB (OUTPATIENT)
Dept: LAB | Facility: OTHER | Age: 64
End: 2019-08-16

## 2019-08-16 ENCOUNTER — TRANSCRIBE ORDERS (OUTPATIENT)
Dept: GENERAL RADIOLOGY | Facility: CLINIC | Age: 64
End: 2019-08-16

## 2019-08-16 DIAGNOSIS — N17.9 ACUTE RENAL FAILURE, UNSPECIFIED ACUTE RENAL FAILURE TYPE (HCC): Primary | ICD-10-CM

## 2019-08-16 DIAGNOSIS — E78.5 HYPERLIPIDEMIA, UNSPECIFIED HYPERLIPIDEMIA TYPE: ICD-10-CM

## 2019-08-16 DIAGNOSIS — K21.9 CHRONIC GASTROESOPHAGEAL REFLUX DISEASE: ICD-10-CM

## 2019-08-16 DIAGNOSIS — E11.9 DIABETES MELLITUS WITHOUT COMPLICATION (HCC): ICD-10-CM

## 2019-08-16 DIAGNOSIS — N18.2 CHRONIC KIDNEY DISEASE, STAGE II (MILD): ICD-10-CM

## 2019-08-16 DIAGNOSIS — N17.9 ACUTE RENAL FAILURE, UNSPECIFIED ACUTE RENAL FAILURE TYPE (HCC): ICD-10-CM

## 2019-08-16 LAB
ALBUMIN SERPL-MCNC: 4.2 G/DL (ref 3.5–5)
ALBUMIN/GLOB SERPL: 1.4 G/DL (ref 1.1–1.8)
ALP SERPL-CCNC: 39 U/L (ref 38–126)
ALT SERPL W P-5'-P-CCNC: 18 U/L
ANION GAP SERPL CALCULATED.3IONS-SCNC: 10 MMOL/L (ref 5–15)
AST SERPL-CCNC: 26 U/L (ref 14–36)
BASOPHILS # BLD AUTO: 0.02 10*3/MM3 (ref 0–0.2)
BASOPHILS NFR BLD AUTO: 0.4 % (ref 0–1.5)
BILIRUB SERPL-MCNC: 0.8 MG/DL (ref 0.2–1.3)
BUN BLD-MCNC: 38 MG/DL (ref 7–23)
BUN/CREAT SERPL: 31.9 (ref 7–25)
CALCIUM SPEC-SCNC: 10.1 MG/DL (ref 8.4–10.2)
CHLORIDE SERPL-SCNC: 102 MMOL/L (ref 101–112)
CO2 SERPL-SCNC: 29 MMOL/L (ref 22–30)
CREAT BLD-MCNC: 1.19 MG/DL (ref 0.52–1.04)
DEPRECATED RDW RBC AUTO: 41.6 FL (ref 37–54)
EOSINOPHIL # BLD AUTO: 0.09 10*3/MM3 (ref 0–0.4)
EOSINOPHIL NFR BLD AUTO: 1.7 % (ref 0.3–6.2)
ERYTHROCYTE [DISTWIDTH] IN BLOOD BY AUTOMATED COUNT: 12.6 % (ref 12.3–15.4)
GFR SERPL CREATININE-BSD FRML MDRD: 46 ML/MIN/1.73 (ref 45–104)
GLOBULIN UR ELPH-MCNC: 2.9 GM/DL (ref 2.3–3.5)
GLUCOSE BLD-MCNC: 163 MG/DL (ref 70–99)
HCT VFR BLD AUTO: 34.7 % (ref 34–46.6)
HGB BLD-MCNC: 11.8 G/DL (ref 12–15.9)
LYMPHOCYTES # BLD AUTO: 1.41 10*3/MM3 (ref 0.7–3.1)
LYMPHOCYTES NFR BLD AUTO: 25.9 % (ref 19.6–45.3)
MCH RBC QN AUTO: 32.1 PG (ref 26.6–33)
MCHC RBC AUTO-ENTMCNC: 34 G/DL (ref 31.5–35.7)
MCV RBC AUTO: 94.3 FL (ref 79–97)
MONOCYTES # BLD AUTO: 0.54 10*3/MM3 (ref 0.1–0.9)
MONOCYTES NFR BLD AUTO: 9.9 % (ref 5–12)
NEUTROPHILS # BLD AUTO: 3.38 10*3/MM3 (ref 1.7–7)
NEUTROPHILS NFR BLD AUTO: 62.1 % (ref 42.7–76)
PHOSPHATE SERPL-MCNC: 4.4 MG/DL (ref 2.5–4.5)
PLATELET # BLD AUTO: 179 10*3/MM3 (ref 140–450)
PMV BLD AUTO: 9.8 FL (ref 6–12)
POTASSIUM BLD-SCNC: 5.2 MMOL/L (ref 3.4–5)
PROT SERPL-MCNC: 7.1 G/DL (ref 6.3–8.6)
RBC # BLD AUTO: 3.68 10*6/MM3 (ref 3.77–5.28)
SODIUM BLD-SCNC: 141 MMOL/L (ref 137–145)
URATE SERPL-MCNC: 5 MG/DL (ref 2.5–6.2)
WBC NRBC COR # BLD: 5.44 10*3/MM3 (ref 3.4–10.8)

## 2019-08-16 PROCEDURE — 85025 COMPLETE CBC W/AUTO DIFF WBC: CPT | Performed by: INTERNAL MEDICINE

## 2019-08-16 PROCEDURE — 84550 ASSAY OF BLOOD/URIC ACID: CPT | Performed by: INTERNAL MEDICINE

## 2019-08-16 PROCEDURE — 84156 ASSAY OF PROTEIN URINE: CPT | Performed by: INTERNAL MEDICINE

## 2019-08-16 PROCEDURE — 82570 ASSAY OF URINE CREATININE: CPT | Performed by: INTERNAL MEDICINE

## 2019-08-16 PROCEDURE — 36415 COLL VENOUS BLD VENIPUNCTURE: CPT | Performed by: INTERNAL MEDICINE

## 2019-08-16 PROCEDURE — 84300 ASSAY OF URINE SODIUM: CPT | Performed by: INTERNAL MEDICINE

## 2019-08-16 PROCEDURE — 83935 ASSAY OF URINE OSMOLALITY: CPT | Performed by: INTERNAL MEDICINE

## 2019-08-16 PROCEDURE — 80053 COMPREHEN METABOLIC PANEL: CPT | Performed by: INTERNAL MEDICINE

## 2019-08-16 PROCEDURE — 84100 ASSAY OF PHOSPHORUS: CPT | Performed by: INTERNAL MEDICINE

## 2019-08-17 LAB
CREAT UR-MCNC: 71.6 MG/DL
OSMOLALITY UR: 477 MOSM/KG
PROT UR-MCNC: 7 MG/DL
PROT/CREAT UR: 97.8 MG/G CREA (ref 0–200)
SODIUM UR-SCNC: 57 MMOL/L

## 2019-10-10 RX ORDER — DULAGLUTIDE 0.75 MG/.5ML
INJECTION, SOLUTION SUBCUTANEOUS
Qty: 2 ML | Refills: 2 | Status: SHIPPED | OUTPATIENT
Start: 2019-10-10 | End: 2019-10-14 | Stop reason: CLARIF

## 2019-10-24 RX ORDER — LISINOPRIL 40 MG/1
TABLET ORAL
Qty: 15 TABLET | Refills: 5 | Status: SHIPPED | OUTPATIENT
Start: 2019-10-24 | End: 2020-03-12 | Stop reason: SDUPTHER

## 2019-11-12 RX ORDER — SIMVASTATIN 40 MG
40 TABLET ORAL
Qty: 30 TABLET | Refills: 0 | Status: SHIPPED | OUTPATIENT
Start: 2019-11-12 | End: 2019-12-03 | Stop reason: SDUPTHER

## 2019-11-15 RX ORDER — GLIPIZIDE 10 MG/1
TABLET ORAL
Qty: 60 TABLET | Refills: 5 | Status: SHIPPED | OUTPATIENT
Start: 2019-11-15 | End: 2020-05-18

## 2019-12-03 RX ORDER — SIMVASTATIN 40 MG
40 TABLET ORAL
Qty: 30 TABLET | Refills: 0 | Status: SHIPPED | OUTPATIENT
Start: 2019-12-03 | End: 2019-12-16 | Stop reason: SDUPTHER

## 2019-12-16 RX ORDER — SIMVASTATIN 40 MG
40 TABLET ORAL
Qty: 30 TABLET | Refills: 1 | Status: SHIPPED | OUTPATIENT
Start: 2019-12-16 | End: 2020-02-18

## 2020-02-18 DIAGNOSIS — E11.9 TYPE 2 DIABETES MELLITUS WITHOUT COMPLICATION, WITHOUT LONG-TERM CURRENT USE OF INSULIN (HCC): Primary | ICD-10-CM

## 2020-02-18 RX ORDER — SIMVASTATIN 40 MG
40 TABLET ORAL
Qty: 30 TABLET | Refills: 0 | Status: SHIPPED | OUTPATIENT
Start: 2020-02-18 | End: 2020-03-12 | Stop reason: SDUPTHER

## 2020-02-18 RX ORDER — FLUTICASONE PROPIONATE 50 MCG
SPRAY, SUSPENSION (ML) NASAL
Qty: 16 G | Refills: 1 | Status: SHIPPED | OUTPATIENT
Start: 2020-02-18 | End: 2020-04-20

## 2020-02-18 RX ORDER — BLOOD SUGAR DIAGNOSTIC
STRIP MISCELLANEOUS
Qty: 50 EACH | Refills: 5 | Status: SHIPPED | OUTPATIENT
Start: 2020-02-18 | End: 2020-10-20 | Stop reason: SDUPTHER

## 2020-02-26 RX ORDER — SEMAGLUTIDE 1.34 MG/ML
INJECTION, SOLUTION SUBCUTANEOUS
Qty: 1.5 ML | Refills: 0 | Status: SHIPPED | OUTPATIENT
Start: 2020-02-26 | End: 2020-03-12 | Stop reason: CLARIF

## 2020-03-03 ENCOUNTER — LAB (OUTPATIENT)
Dept: LAB | Facility: OTHER | Age: 65
End: 2020-03-03

## 2020-03-03 DIAGNOSIS — E11.9 TYPE 2 DIABETES MELLITUS WITHOUT COMPLICATION, WITHOUT LONG-TERM CURRENT USE OF INSULIN (HCC): ICD-10-CM

## 2020-03-03 LAB
ALBUMIN SERPL-MCNC: 4.1 G/DL (ref 3.5–5)
ALBUMIN/GLOB SERPL: 1.3 G/DL (ref 1.1–1.8)
ALP SERPL-CCNC: 50 U/L (ref 38–126)
ALT SERPL W P-5'-P-CCNC: 22 U/L
ANION GAP SERPL CALCULATED.3IONS-SCNC: 10 MMOL/L (ref 5–15)
AST SERPL-CCNC: 30 U/L (ref 14–36)
BACTERIA UR QL AUTO: ABNORMAL /HPF
BASOPHILS # BLD AUTO: 0.02 10*3/MM3 (ref 0–0.2)
BASOPHILS NFR BLD AUTO: 0.4 % (ref 0–1.5)
BILIRUB SERPL-MCNC: 0.5 MG/DL (ref 0.2–1.3)
BILIRUB UR QL STRIP: NEGATIVE
BUN BLD-MCNC: 27 MG/DL (ref 7–23)
BUN/CREAT SERPL: 25 (ref 7–25)
CALCIUM SPEC-SCNC: 10.6 MG/DL (ref 8.4–10.2)
CHLORIDE SERPL-SCNC: 104 MMOL/L (ref 101–112)
CHOLEST SERPL-MCNC: 135 MG/DL (ref 150–200)
CLARITY UR: ABNORMAL
CO2 SERPL-SCNC: 27 MMOL/L (ref 22–30)
COLOR UR: YELLOW
CREAT BLD-MCNC: 1.08 MG/DL (ref 0.52–1.04)
DEPRECATED RDW RBC AUTO: 42.7 FL (ref 37–54)
EOSINOPHIL # BLD AUTO: 0.04 10*3/MM3 (ref 0–0.4)
EOSINOPHIL NFR BLD AUTO: 0.9 % (ref 0.3–6.2)
ERYTHROCYTE [DISTWIDTH] IN BLOOD BY AUTOMATED COUNT: 13.1 % (ref 12.3–15.4)
GFR SERPL CREATININE-BSD FRML MDRD: 51 ML/MIN/1.73 (ref 45–104)
GLOBULIN UR ELPH-MCNC: 3.2 GM/DL (ref 2.3–3.5)
GLUCOSE BLD-MCNC: 193 MG/DL (ref 70–99)
GLUCOSE UR STRIP-MCNC: NEGATIVE MG/DL
HCT VFR BLD AUTO: 35.2 % (ref 34–46.6)
HDLC SERPL-MCNC: 68 MG/DL (ref 40–59)
HGB BLD-MCNC: 11.5 G/DL (ref 12–15.9)
HGB UR QL STRIP.AUTO: ABNORMAL
HYALINE CASTS UR QL AUTO: ABNORMAL /LPF
KETONES UR QL STRIP: NEGATIVE
LDLC SERPL CALC-MCNC: 51 MG/DL
LDLC/HDLC SERPL: 0.75 {RATIO} (ref 0–3.22)
LEUKOCYTE ESTERASE UR QL STRIP.AUTO: ABNORMAL
LYMPHOCYTES # BLD AUTO: 1.33 10*3/MM3 (ref 0.7–3.1)
LYMPHOCYTES NFR BLD AUTO: 29.2 % (ref 19.6–45.3)
MCH RBC QN AUTO: 30.5 PG (ref 26.6–33)
MCHC RBC AUTO-ENTMCNC: 32.7 G/DL (ref 31.5–35.7)
MCV RBC AUTO: 93.4 FL (ref 79–97)
MONOCYTES # BLD AUTO: 0.51 10*3/MM3 (ref 0.1–0.9)
MONOCYTES NFR BLD AUTO: 11.2 % (ref 5–12)
NEUTROPHILS # BLD AUTO: 2.66 10*3/MM3 (ref 1.7–7)
NEUTROPHILS NFR BLD AUTO: 58.3 % (ref 42.7–76)
NITRITE UR QL STRIP: POSITIVE
PH UR STRIP.AUTO: <=5 [PH] (ref 5.5–8)
PLATELET # BLD AUTO: 168 10*3/MM3 (ref 140–450)
PMV BLD AUTO: 9.1 FL (ref 6–12)
POTASSIUM BLD-SCNC: 4.5 MMOL/L (ref 3.4–5)
PROT SERPL-MCNC: 7.3 G/DL (ref 6.3–8.6)
PROT UR QL STRIP: NEGATIVE
RBC # BLD AUTO: 3.77 10*6/MM3 (ref 3.77–5.28)
RBC # UR: ABNORMAL /HPF
REF LAB TEST METHOD: ABNORMAL
SODIUM BLD-SCNC: 141 MMOL/L (ref 137–145)
SP GR UR STRIP: 1.02 (ref 1–1.03)
SQUAMOUS #/AREA URNS HPF: ABNORMAL /HPF
TRIGL SERPL-MCNC: 79 MG/DL
UROBILINOGEN UR QL STRIP: ABNORMAL
VLDLC SERPL-MCNC: 15.8 MG/DL
WBC CLUMPS # UR AUTO: ABNORMAL /HPF
WBC NRBC COR # BLD: 4.56 10*3/MM3 (ref 3.4–10.8)
WBC UR QL AUTO: ABNORMAL /HPF

## 2020-03-03 PROCEDURE — 83036 HEMOGLOBIN GLYCOSYLATED A1C: CPT | Performed by: NURSE PRACTITIONER

## 2020-03-03 PROCEDURE — 87086 URINE CULTURE/COLONY COUNT: CPT | Performed by: NURSE PRACTITIONER

## 2020-03-03 PROCEDURE — 87077 CULTURE AEROBIC IDENTIFY: CPT | Performed by: NURSE PRACTITIONER

## 2020-03-03 PROCEDURE — 84481 FREE ASSAY (FT-3): CPT | Performed by: NURSE PRACTITIONER

## 2020-03-03 PROCEDURE — 84443 ASSAY THYROID STIM HORMONE: CPT | Performed by: NURSE PRACTITIONER

## 2020-03-03 PROCEDURE — 80061 LIPID PANEL: CPT | Performed by: NURSE PRACTITIONER

## 2020-03-03 PROCEDURE — 87186 SC STD MICRODIL/AGAR DIL: CPT | Performed by: NURSE PRACTITIONER

## 2020-03-03 PROCEDURE — 36415 COLL VENOUS BLD VENIPUNCTURE: CPT | Performed by: NURSE PRACTITIONER

## 2020-03-03 PROCEDURE — 85025 COMPLETE CBC W/AUTO DIFF WBC: CPT | Performed by: NURSE PRACTITIONER

## 2020-03-03 PROCEDURE — 84436 ASSAY OF TOTAL THYROXINE: CPT | Performed by: NURSE PRACTITIONER

## 2020-03-03 PROCEDURE — 81001 URINALYSIS AUTO W/SCOPE: CPT | Performed by: NURSE PRACTITIONER

## 2020-03-03 PROCEDURE — 80053 COMPREHEN METABOLIC PANEL: CPT | Performed by: NURSE PRACTITIONER

## 2020-03-03 RX ORDER — SULFAMETHOXAZOLE AND TRIMETHOPRIM 800; 160 MG/1; MG/1
1 TABLET ORAL 2 TIMES DAILY
Qty: 14 TABLET | Refills: 0 | Status: SHIPPED | OUTPATIENT
Start: 2020-03-03 | End: 2020-03-12

## 2020-03-04 LAB
HBA1C MFR BLD: 8.85 % (ref 4.8–5.6)
T3FREE SERPL-MCNC: 3.07 PG/ML (ref 2–4.4)
T4 SERPL-MCNC: 8.89 MCG/DL (ref 4.5–11.7)
TSH SERPL DL<=0.05 MIU/L-ACNC: 3.15 UIU/ML (ref 0.27–4.2)

## 2020-03-05 LAB — BACTERIA SPEC AEROBE CULT: ABNORMAL

## 2020-03-05 RX ORDER — NITROFURANTOIN 25; 75 MG/1; MG/1
100 CAPSULE ORAL 2 TIMES DAILY
Qty: 14 CAPSULE | Refills: 0 | Status: SHIPPED | OUTPATIENT
Start: 2020-03-05 | End: 2020-03-12

## 2020-03-11 DIAGNOSIS — R30.0 DYSURIA: Primary | ICD-10-CM

## 2020-03-12 ENCOUNTER — OFFICE VISIT (OUTPATIENT)
Dept: FAMILY MEDICINE CLINIC | Facility: CLINIC | Age: 65
End: 2020-03-12

## 2020-03-12 ENCOUNTER — LAB (OUTPATIENT)
Dept: LAB | Facility: OTHER | Age: 65
End: 2020-03-12

## 2020-03-12 VITALS
WEIGHT: 125.6 LBS | SYSTOLIC BLOOD PRESSURE: 110 MMHG | DIASTOLIC BLOOD PRESSURE: 62 MMHG | HEIGHT: 65 IN | HEART RATE: 60 BPM | BODY MASS INDEX: 20.93 KG/M2 | TEMPERATURE: 99.2 F

## 2020-03-12 DIAGNOSIS — F32.A DEPRESSION, UNSPECIFIED DEPRESSION TYPE: Chronic | ICD-10-CM

## 2020-03-12 DIAGNOSIS — R30.0 DYSURIA: ICD-10-CM

## 2020-03-12 DIAGNOSIS — I10 ESSENTIAL HYPERTENSION: Chronic | ICD-10-CM

## 2020-03-12 DIAGNOSIS — F41.9 ANXIETY: Chronic | ICD-10-CM

## 2020-03-12 DIAGNOSIS — E11.9 TYPE 2 DIABETES MELLITUS WITHOUT COMPLICATION, WITHOUT LONG-TERM CURRENT USE OF INSULIN (HCC): Primary | Chronic | ICD-10-CM

## 2020-03-12 DIAGNOSIS — G47.00 INSOMNIA, UNSPECIFIED TYPE: ICD-10-CM

## 2020-03-12 DIAGNOSIS — R79.89 ELEVATED SERUM CREATININE: ICD-10-CM

## 2020-03-12 DIAGNOSIS — R79.9 ELEVATED BUN: ICD-10-CM

## 2020-03-12 LAB
BILIRUB UR QL STRIP: NEGATIVE
CLARITY UR: CLEAR
COLOR UR: ABNORMAL
GLUCOSE UR STRIP-MCNC: ABNORMAL MG/DL
HGB UR QL STRIP.AUTO: NEGATIVE
KETONES UR QL STRIP: ABNORMAL
LEUKOCYTE ESTERASE UR QL STRIP.AUTO: NEGATIVE
NITRITE UR QL STRIP: NEGATIVE
PH UR STRIP.AUTO: <=5 [PH] (ref 5.5–8)
PROT UR QL STRIP: NEGATIVE
SP GR UR STRIP: 1.02 (ref 1–1.03)
UROBILINOGEN UR QL STRIP: ABNORMAL

## 2020-03-12 PROCEDURE — 81003 URINALYSIS AUTO W/O SCOPE: CPT | Performed by: NURSE PRACTITIONER

## 2020-03-12 PROCEDURE — 99214 OFFICE O/P EST MOD 30 MIN: CPT | Performed by: NURSE PRACTITIONER

## 2020-03-12 RX ORDER — HYDROXYZINE HYDROCHLORIDE 25 MG/1
TABLET, FILM COATED ORAL
Qty: 60 TABLET | Refills: 0 | Status: SHIPPED | OUTPATIENT
Start: 2020-03-12 | End: 2021-11-22

## 2020-03-12 RX ORDER — SIMVASTATIN 40 MG
40 TABLET ORAL
Qty: 30 TABLET | Refills: 5 | Status: SHIPPED | OUTPATIENT
Start: 2020-03-12 | End: 2020-08-11

## 2020-03-12 NOTE — PROGRESS NOTES
"Subjective   Odessa Walker is a 64 y.o. female. Patient here today with complaints of Follow-up  pt here today for recheck of htn, diabetes, insomnia, anxiety and depression. She had labs recently and is here for those results, states she has not seen nephrology for approx the last year but is due to see again. Bun and cr still elevated on most recent labs, a1c increased and she reports fsbs at home running on average 200s. She has been treated for UTI recently, was initially given an antibiotic which she \"could not take\", was then changed to macrobid which she finished yesterday and tolerated well. Did have repeat ua this am.      Vitals:    03/12/20 1308   BP: 110/62   Pulse: 60   Temp: 99.2 °F (37.3 °C)   Weight: 57 kg (125 lb 9.6 oz)   Height: 165.1 cm (65\")     Body mass index is 20.9 kg/m².  Past Medical History:   Diagnosis Date   • Arthritis     BACK, KNEES AND SHOULDER   • Benign essential hypertension    • Candidiasis of skin and nail     L foot   • Chest pain    • Chronic anemia    • Claudication (CMS/HCC)    • Corns and callosities    • Depressive disorder    • Dermatitis    • Disease of nail     other specified   • Disorder of peripheral nervous system    • Displaced fracture of third metatarsal bone, right foot, initial encounter for closed fracture    • Displaced fracture of third metatarsal bone, right foot, subsequent encounter for fracture with routine healing    • Diverticular disease of colon    • Dog bite of hand    • Epigastric pain    • Essential hypertension    • Foot pain    • GERD (gastroesophageal reflux disease)    • H/O screening mammography    • Hallux valgus    • Heartburn    • Hyperlipidemia    • Joint pain    • Joint swelling    • Neurologic disorder associated with diabetes mellitus (CMS/HCC)     type 2   • Nondisplaced fracture of fourth metatarsal bone, right foot, initial encounter for closed fracture    • Nondisplaced fracture of fourth metatarsal bone, right foot, " subsequent encounter for fracture with routine healing    • Pain in right leg    • Superficial laceration of hand    • Type 2 diabetes mellitus (CMS/HCC)    • Urinary tract infectious disease    • Venous insufficiency (chronic) (peripheral)      Diabetes   She presents for her follow-up diabetic visit. She has type 2 diabetes mellitus. Her disease course has been stable. There are no hypoglycemic associated symptoms. Pertinent negatives for hypoglycemia include no headaches. There are no diabetic associated symptoms. Pertinent negatives for diabetes include no chest pain. There are no hypoglycemic complications. Symptoms are stable. There are no diabetic complications. Risk factors for coronary artery disease include diabetes mellitus, hypertension and post-menopausal. Current diabetic treatment includes diet, oral agent (monotherapy) and insulin injections (taking victoza). She is compliant with treatment most of the time. Her weight is increasing steadily. She is following a generally healthy diet. Her home blood glucose trend is increasing steadily. Her breakfast blood glucose range is generally >200 mg/dl. An ACE inhibitor/angiotensin II receptor blocker is being taken.   Hypertension   This is a chronic problem. The current episode started more than 1 year ago. The problem is unchanged. The problem is controlled. Pertinent negatives include no chest pain, headaches or shortness of breath. There are no associated agents to hypertension. Risk factors for coronary artery disease include post-menopausal state, diabetes mellitus and sedentary lifestyle. Past treatments include lifestyle changes and ACE inhibitors. Current antihypertension treatment includes lifestyle changes and ACE inhibitors. The current treatment provides moderate improvement. Compliance problems include exercise and diet.    Insomnia   This is a chronic problem. The current episode started more than 1 year ago. The problem occurs intermittently.  The problem has been waxing and waning. Pertinent negatives include no chest pain or headaches. The symptoms are aggravated by stress. She has tried rest, sleep and relaxation for the symptoms. The treatment provided no relief.        The following portions of the patient's history were reviewed and updated as appropriate: allergies, current medications, past family history, past medical history, past social history, past surgical history and problem list.    Review of Systems   Constitutional: Negative.    HENT: Negative.    Eyes: Negative.    Respiratory: Negative.  Negative for shortness of breath.    Cardiovascular: Negative.  Negative for chest pain.   Gastrointestinal: Negative.    Endocrine: Negative.    Genitourinary: Negative.    Musculoskeletal: Negative.    Skin: Negative.    Allergic/Immunologic: Negative.    Neurological: Negative.  Negative for headaches.   Hematological: Negative.    Psychiatric/Behavioral: Positive for sleep disturbance. The patient has insomnia.        Objective   Physical Exam   Constitutional: She is oriented to person, place, and time. She appears well-developed and well-nourished. No distress.   HENT:   Head: Normocephalic and atraumatic.   Neck: Neck supple. Normal carotid pulses present. Carotid bruit is not present.   Cardiovascular: Normal rate, regular rhythm and normal heart sounds. Exam reveals no gallop and no friction rub.   No murmur heard.  Pulmonary/Chest: Effort normal and breath sounds normal. No stridor. No respiratory distress. She has no wheezes. She has no rales.   Musculoskeletal: She exhibits no edema.   Neurological: She is alert and oriented to person, place, and time.   Skin: Skin is warm and dry. No rash noted. She is not diaphoretic. No erythema. No pallor.   Psychiatric: She has a normal mood and affect. Her behavior is normal.   Nursing note and vitals reviewed.      Assessment/Plan   Odessa was seen today for follow-up.    Diagnoses and all orders for  this visit:    Type 2 diabetes mellitus without complication, without long-term current use of insulin (CMS/[facility])  Comments:  uncontrolled, hyperglycemia  Orders:  -     Ambulatory Referral to Nephrology    Elevated BUN  Comments:  referred back to nephrology   Orders:  -     Ambulatory Referral to Nephrology    Elevated serum creatinine  Comments:  referred back to nephrology   Orders:  -     Ambulatory Referral to Nephrology    Essential hypertension  -     Ambulatory Referral to Nephrology    Anxiety    Depression, unspecified depression type    Insomnia, unspecified type  Comments:  uncontrolled , started on atarax     Other orders  -     hydrOXYzine (ATARAX) 25 MG tablet; Take 1-2 po qhs prn sleep  -     simvastatin (ZOCOR) 40 MG tablet; Take 1 tablet by mouth every night at bedtime.  -     Liraglutide (VICTOZA) 18 MG/3ML solution pen-injector injection; Inject 1.2 mg under the skin into the appropriate area as directed Daily.    lab results are discussed with her today, copies given to her today  Will increase victoza to 1.2mg qd and will refill zocor as above  She is prescribed atarax 1-2 po qhs prn sleep  If this does not help her symptoms she is asked to RTC for recheck  Otherwise will see her back in 3 months for recheck and for repeat a1c and cmp at that time  She is aware and is in agreement to this plan  Referred back to nephrology for further eval and treatment of elevated bun and cr, renal disease   Pt aware  All questions and concerns are addressed with understanding noted.

## 2020-04-20 RX ORDER — FLUTICASONE PROPIONATE 50 MCG
SPRAY, SUSPENSION (ML) NASAL
Qty: 16 G | Refills: 1 | Status: SHIPPED | OUTPATIENT
Start: 2020-04-20 | End: 2020-11-25

## 2020-05-12 RX ORDER — LISINOPRIL 40 MG/1
TABLET ORAL
Qty: 15 TABLET | Refills: 4 | Status: SHIPPED | OUTPATIENT
Start: 2020-05-12 | End: 2020-09-10

## 2020-05-18 ENCOUNTER — OFFICE VISIT (OUTPATIENT)
Dept: FAMILY MEDICINE CLINIC | Facility: CLINIC | Age: 65
End: 2020-05-18

## 2020-05-18 VITALS — HEART RATE: 64 BPM | SYSTOLIC BLOOD PRESSURE: 136 MMHG | DIASTOLIC BLOOD PRESSURE: 43 MMHG

## 2020-05-18 DIAGNOSIS — E11.65 TYPE 2 DIABETES MELLITUS WITH HYPERGLYCEMIA, WITHOUT LONG-TERM CURRENT USE OF INSULIN (HCC): Primary | Chronic | ICD-10-CM

## 2020-05-18 DIAGNOSIS — I10 ESSENTIAL HYPERTENSION: Chronic | ICD-10-CM

## 2020-05-18 PROCEDURE — G2025 DIS SITE TELE SVCS RHC/FQHC: HCPCS | Performed by: NURSE PRACTITIONER

## 2020-05-18 RX ORDER — GLIPIZIDE 10 MG/1
TABLET ORAL
Qty: 60 TABLET | Refills: 5 | Status: SHIPPED | OUTPATIENT
Start: 2020-05-18 | End: 2020-06-17

## 2020-05-18 NOTE — PROGRESS NOTES
Subjective   Odessa Walker is a 65 y.o. female. Patient here today with complaints of No chief complaint on file.  pt contacted office today for telehealth visit complaining that her blood sugars are still running high. She states this am fsbs was 250, she reports blood sugars running consistently over 200s. She is only taking victoza weekly instead of daily. Reports bp running 111-136 systolic and 40-60s diastolic. Pulse ranging 60-70s.     Vitals:    05/18/20 1450   BP: 136/43   Pulse: 64     There is no height or weight on file to calculate BMI.  Past Medical History:   Diagnosis Date   • Arthritis     BACK, KNEES AND SHOULDER   • Benign essential hypertension    • Candidiasis of skin and nail     L foot   • Chest pain    • Chronic anemia    • Claudication (CMS/HCC)    • Corns and callosities    • Depressive disorder    • Dermatitis    • Disease of nail     other specified   • Disorder of peripheral nervous system    • Displaced fracture of third metatarsal bone, right foot, initial encounter for closed fracture    • Displaced fracture of third metatarsal bone, right foot, subsequent encounter for fracture with routine healing    • Diverticular disease of colon    • Dog bite of hand    • Epigastric pain    • Essential hypertension    • Foot pain    • GERD (gastroesophageal reflux disease)    • H/O screening mammography    • Hallux valgus    • Heartburn    • Hyperlipidemia    • Joint pain    • Joint swelling    • Neurologic disorder associated with diabetes mellitus (CMS/HCC)     type 2   • Nondisplaced fracture of fourth metatarsal bone, right foot, initial encounter for closed fracture    • Nondisplaced fracture of fourth metatarsal bone, right foot, subsequent encounter for fracture with routine healing    • Pain in right leg    • Superficial laceration of hand    • Type 2 diabetes mellitus (CMS/HCC)    • Urinary tract infectious disease    • Venous insufficiency (chronic) (peripheral)      Diabetes   She  presents for her follow-up diabetic visit. She has type 2 diabetes mellitus. Her disease course has been worsening. There are no hypoglycemic associated symptoms. Pertinent negatives for hypoglycemia include no headaches. Pertinent negatives for diabetes include no chest pain. There are no hypoglycemic complications. Symptoms are stable. Risk factors for coronary artery disease include hypertension, post-menopausal and dyslipidemia. Current diabetic treatment includes diet, oral agent (monotherapy) and insulin injections (victoza injections, only taking weekly however ). She is compliant with treatment most of the time. She is following a generally healthy diet. Her breakfast blood glucose range is generally >200 mg/dl. An ACE inhibitor/angiotensin II receptor blocker is being taken.   Hypertension   This is a chronic problem. The current episode started more than 1 year ago. The problem is unchanged. The problem is controlled. Pertinent negatives include no chest pain, headaches or shortness of breath. There are no associated agents to hypertension. Risk factors for coronary artery disease include post-menopausal state, dyslipidemia and diabetes mellitus. Past treatments include lifestyle changes and ACE inhibitors. Current antihypertension treatment includes lifestyle changes and ACE inhibitors. The current treatment provides moderate improvement. Compliance problems include exercise, diet and medication cost.         The following portions of the patient's history were reviewed and updated as appropriate: allergies, current medications, past family history, past medical history, past social history, past surgical history and problem list.    Review of Systems   Constitutional: Negative.    HENT: Negative.    Eyes: Negative.    Respiratory: Negative.  Negative for shortness of breath.    Cardiovascular: Negative.  Negative for chest pain.   Gastrointestinal: Negative.    Endocrine: Negative.    Genitourinary:  Negative.    Musculoskeletal: Negative.    Skin: Negative.    Allergic/Immunologic: Negative.    Neurological: Negative.  Negative for headaches.   Hematological: Negative.    Psychiatric/Behavioral: Negative.        Objective   Physical Exam  Deferred today, telephone visit     Assessment/Plan   Diagnoses and all orders for this visit:    Type 2 diabetes mellitus with hyperglycemia, without long-term current use of insulin (CMS/[facility])  Comments:  uncontrolled    Essential hypertension      Pt is advised to take victoza daily instead of weekly , continue with diet and exercise and amaryl  Follow up in 1 week to make sure she is tolerating medicine well and to ensure this is working well for her, decreasing blood sugars  She is advised to monitor her fsbs and BP / pulse closely and record , will inform me of results at next visit, call back sooner if nec  She is aware and is in agreement to this plan  All questions and concerns are addressed with understanding noted  You have chosen to receive care through a telephone visit. Do you consent to use a telephone visit for your medical care today? Yes  This visit has been rescheduled as a phone visit to comply with patient safety concerns in accordance with CDC recommendations. Total time of discussion was 9 minutes.

## 2020-05-26 ENCOUNTER — OFFICE VISIT (OUTPATIENT)
Dept: FAMILY MEDICINE CLINIC | Facility: CLINIC | Age: 65
End: 2020-05-26

## 2020-05-26 VITALS — HEART RATE: 71 BPM | SYSTOLIC BLOOD PRESSURE: 113 MMHG | DIASTOLIC BLOOD PRESSURE: 61 MMHG

## 2020-05-26 DIAGNOSIS — I10 ESSENTIAL HYPERTENSION: Primary | ICD-10-CM

## 2020-05-26 DIAGNOSIS — E11.65 TYPE 2 DIABETES MELLITUS WITH HYPERGLYCEMIA, WITHOUT LONG-TERM CURRENT USE OF INSULIN (HCC): ICD-10-CM

## 2020-05-26 PROCEDURE — G2025 DIS SITE TELE SVCS RHC/FQHC: HCPCS | Performed by: NURSE PRACTITIONER

## 2020-05-26 NOTE — PROGRESS NOTES
Subjective   Odessa Walker is a 65 y.o. female. Patient here today with complaints of No chief complaint on file.  pt here today for recheck of diabetes, states her fsbs have been running lower than before but still not under good control. She is now taking victoza daily, she continues on glipizide 10mg , takes 2 nightly. Reports fsbs this am was 176, previous week they have been running 176-241, on average 180s. She was on metformin previously but this is also when her  , she was having GI upset and thinks it was due to his death instead of glucophage however metformin was stopped at that time and she never restarted it states BP under good control, SBP running 110-126 and DBP running 40-60s.     Vitals:    20 0851   BP: 113/61   Pulse: 71     There is no height or weight on file to calculate BMI.  Past Medical History:   Diagnosis Date   • Arthritis     BACK, KNEES AND SHOULDER   • Benign essential hypertension    • Candidiasis of skin and nail     L foot   • Chest pain    • Chronic anemia    • Claudication (CMS/HCC)    • Corns and callosities    • Depressive disorder    • Dermatitis    • Disease of nail     other specified   • Disorder of peripheral nervous system    • Displaced fracture of third metatarsal bone, right foot, initial encounter for closed fracture    • Displaced fracture of third metatarsal bone, right foot, subsequent encounter for fracture with routine healing    • Diverticular disease of colon    • Dog bite of hand    • Epigastric pain    • Essential hypertension    • Foot pain    • GERD (gastroesophageal reflux disease)    • H/O screening mammography    • Hallux valgus    • Heartburn    • Hyperlipidemia    • Joint pain    • Joint swelling    • Neurologic disorder associated with diabetes mellitus (CMS/HCC)     type 2   • Nondisplaced fracture of fourth metatarsal bone, right foot, initial encounter for closed fracture    • Nondisplaced fracture of fourth metatarsal bone,  right foot, subsequent encounter for fracture with routine healing    • Pain in right leg    • Superficial laceration of hand    • Type 2 diabetes mellitus (CMS/HCC)    • Urinary tract infectious disease    • Venous insufficiency (chronic) (peripheral)      Diabetes   She presents for her follow-up diabetic visit. She has type 2 diabetes mellitus. Her disease course has been stable. There are no hypoglycemic associated symptoms. Pertinent negatives for hypoglycemia include no headaches. There are no diabetic associated symptoms. Pertinent negatives for diabetes include no chest pain. There are no hypoglycemic complications. Symptoms are stable. There are no diabetic complications. Risk factors for coronary artery disease include post-menopausal, hypertension, dyslipidemia and diabetes mellitus. Current diabetic treatment includes diet and oral agent (monotherapy) (victoza). She is compliant with treatment most of the time. She is following a generally healthy diet. Her home blood glucose trend is decreasing steadily. Her breakfast blood glucose range is generally 180-200 mg/dl. An ACE inhibitor/angiotensin II receptor blocker is being taken.   Hypertension   This is a chronic problem. The current episode started more than 1 year ago. The problem is unchanged. The problem is controlled. Pertinent negatives include no chest pain, headaches or shortness of breath. Risk factors for coronary artery disease include post-menopausal state, dyslipidemia and diabetes mellitus. Past treatments include lifestyle changes and ACE inhibitors. Current antihypertension treatment includes lifestyle changes and ACE inhibitors. The current treatment provides moderate improvement. Compliance problems include exercise and diet.         The following portions of the patient's history were reviewed and updated as appropriate: allergies, current medications, past family history, past medical history, past social history, past surgical  history and problem list.    Review of Systems   Constitutional: Negative.    HENT: Negative.    Eyes: Negative.    Respiratory: Negative.  Negative for shortness of breath.    Cardiovascular: Negative.  Negative for chest pain.   Gastrointestinal: Negative.    Endocrine: Negative.    Genitourinary: Negative.    Musculoskeletal: Negative.    Skin: Negative.    Allergic/Immunologic: Negative.    Neurological: Negative.  Negative for headaches.   Hematological: Negative.    Psychiatric/Behavioral: Negative.        Objective   Physical Exam  Deferred, telephone visit    Assessment/Plan   Diagnoses and all orders for this visit:    Essential hypertension  -     Comprehensive metabolic panel; Future  -     Urinalysis With Culture If Indicated -; Future  -     Hemoglobin A1c; Future    Type 2 diabetes mellitus with hyperglycemia, without long-term current use of insulin (CMS/[facility])  -     Comprehensive metabolic panel; Future  -     Urinalysis With Culture If Indicated -; Future  -     Hemoglobin A1c; Future  -     metFORMIN (GLUCOPHAGE) 500 MG tablet; Take 1 tablet by mouth 2 (Two) Times a Day With Meals.      She is started back on metformin and advised to take 500mg nightly x 7d and then increase to bid  Follow up in 1 month or sooner if nec  She is to continue on glipizide and victoza daily  victoza needles refill called to [facility]   She is to RTC sooner than one month if nec  She is aware and is in agreement to this plan  Will continue to closely monitor her FSBS   All questions and concerns are addressed with understanding noted  You have chosen to receive care through a telephone visit. Do you consent to use a telephone visit for your medical care today? Yes  This visit has been rescheduled as a phone visit to comply with patient safety concerns in accordance with CDC recommendations. Total time of discussion was 10 minutes.

## 2020-06-11 ENCOUNTER — OFFICE VISIT (OUTPATIENT)
Dept: FAMILY MEDICINE CLINIC | Facility: CLINIC | Age: 65
End: 2020-06-11

## 2020-06-11 DIAGNOSIS — E11.9 TYPE 2 DIABETES MELLITUS WITHOUT COMPLICATION, WITHOUT LONG-TERM CURRENT USE OF INSULIN (HCC): Primary | Chronic | ICD-10-CM

## 2020-06-11 PROCEDURE — G2025 DIS SITE TELE SVCS RHC/FQHC: HCPCS | Performed by: NURSE PRACTITIONER

## 2020-06-11 RX ORDER — PEN NEEDLE, DIABETIC 30 GX3/16"
NEEDLE, DISPOSABLE MISCELLANEOUS
Qty: 100 EACH | Refills: 4 | Status: SHIPPED | OUTPATIENT
Start: 2020-06-11 | End: 2021-08-19

## 2020-06-11 NOTE — PROGRESS NOTES
Subjective   Odessa Walker is a 65 y.o. female. Patient here today with complaints of No chief complaint on file.  Patient here today for follow-up on diabetes, is tolerating metformin well, states she is still taking Amaryl and Victoza, blood sugars have lowered to 130s on average.  States that she is feeling well and she is due for labs, presents today for telehealth visit.  No refills needed today.    There were no vitals filed for this visit.  There is no height or weight on file to calculate BMI.  Past Medical History:   Diagnosis Date   • Arthritis     BACK, KNEES AND SHOULDER   • Benign essential hypertension    • Candidiasis of skin and nail     L foot   • Chest pain    • Chronic anemia    • Claudication (CMS/HCC)    • Corns and callosities    • Depressive disorder    • Dermatitis    • Disease of nail     other specified   • Disorder of peripheral nervous system    • Displaced fracture of third metatarsal bone, right foot, initial encounter for closed fracture    • Displaced fracture of third metatarsal bone, right foot, subsequent encounter for fracture with routine healing    • Diverticular disease of colon    • Dog bite of hand    • Epigastric pain    • Essential hypertension    • Foot pain    • GERD (gastroesophageal reflux disease)    • H/O screening mammography    • Hallux valgus    • Heartburn    • Hyperlipidemia    • Joint pain    • Joint swelling    • Neurologic disorder associated with diabetes mellitus (CMS/HCC)     type 2   • Nondisplaced fracture of fourth metatarsal bone, right foot, initial encounter for closed fracture    • Nondisplaced fracture of fourth metatarsal bone, right foot, subsequent encounter for fracture with routine healing    • Pain in right leg    • Superficial laceration of hand    • Type 2 diabetes mellitus (CMS/HCC)    • Urinary tract infectious disease    • Venous insufficiency (chronic) (peripheral)      Diabetes   She presents for her follow-up diabetic visit. She  has type 2 diabetes mellitus. Her disease course has been stable. There are no hypoglycemic associated symptoms. There are no diabetic associated symptoms. There are no hypoglycemic complications. Symptoms are stable. Risk factors for coronary artery disease include obesity, hypertension, dyslipidemia and diabetes mellitus. Current diabetic treatment includes diet, insulin injections and oral agent (dual therapy) (on victoza). She is compliant with treatment most of the time. She is following a generally healthy diet. She participates in exercise intermittently. Her home blood glucose trend is decreasing steadily. Her breakfast blood glucose range is generally 130-140 mg/dl. An ACE inhibitor/angiotensin II receptor blocker is being taken.        The following portions of the patient's history were reviewed and updated as appropriate: allergies, current medications, past family history, past medical history, past social history, past surgical history and problem list.    Review of Systems   Constitutional: Negative.    HENT: Negative.    Eyes: Negative.    Respiratory: Negative.    Cardiovascular: Negative.    Gastrointestinal: Negative.    Endocrine: Negative.    Genitourinary: Negative.    Musculoskeletal: Negative.    Skin: Negative.    Allergic/Immunologic: Negative.    Neurological: Negative.    Hematological: Negative.    Psychiatric/Behavioral: Negative.        Objective   Physical Exam  Deferred, telephone visit    Assessment/Plan   Diagnoses and all orders for this visit:    Type 2 diabetes mellitus without complication, without long-term current use of insulin (CMS/[facility])  Comments:  improved    Patient will continue on Victoza glimepiride and metformin, labs have already been ordered and she is advised to return here to have these drawn and will be notified of results via phone.  Follow-up in 3 months for recheck or sooner as needed.  Patient aware and in agreement to this plan.  All questions and concerns  are addressed with understanding verbalized. You have chosen to receive care through a telephone visit. Do you consent to use a telephone visit for your medical care today? Yes  This visit has been rescheduled as a phone visit to comply with patient safety concerns in accordance with CDC recommendations. Total time of discussion was 6 minutes.

## 2020-06-15 ENCOUNTER — LAB (OUTPATIENT)
Dept: LAB | Facility: OTHER | Age: 65
End: 2020-06-15

## 2020-06-15 DIAGNOSIS — E11.65 TYPE 2 DIABETES MELLITUS WITH HYPERGLYCEMIA, WITHOUT LONG-TERM CURRENT USE OF INSULIN (HCC): ICD-10-CM

## 2020-06-15 DIAGNOSIS — I10 ESSENTIAL HYPERTENSION: ICD-10-CM

## 2020-06-15 LAB
ALBUMIN SERPL-MCNC: 4.2 G/DL (ref 3.5–5)
ALBUMIN/GLOB SERPL: 1.4 G/DL (ref 1.1–1.8)
ALP SERPL-CCNC: 47 U/L (ref 38–126)
ALT SERPL W P-5'-P-CCNC: 19 U/L
ANION GAP SERPL CALCULATED.3IONS-SCNC: 5 MMOL/L (ref 5–15)
AST SERPL-CCNC: 28 U/L (ref 14–36)
BACTERIA UR QL AUTO: ABNORMAL /HPF
BILIRUB SERPL-MCNC: 0.6 MG/DL (ref 0.2–1.3)
BILIRUB UR QL STRIP: NEGATIVE
BUN BLD-MCNC: 33 MG/DL (ref 7–23)
BUN/CREAT SERPL: 30.6 (ref 7–25)
CALCIUM SPEC-SCNC: 9.8 MG/DL (ref 8.4–10.2)
CHLORIDE SERPL-SCNC: 105 MMOL/L (ref 101–112)
CLARITY UR: CLEAR
CO2 SERPL-SCNC: 28 MMOL/L (ref 22–30)
COLOR UR: YELLOW
CREAT BLD-MCNC: 1.08 MG/DL (ref 0.52–1.04)
GFR SERPL CREATININE-BSD FRML MDRD: 51 ML/MIN/1.73 (ref 45–104)
GLOBULIN UR ELPH-MCNC: 3.1 GM/DL (ref 2.3–3.5)
GLUCOSE BLD-MCNC: 200 MG/DL (ref 70–99)
GLUCOSE UR STRIP-MCNC: NEGATIVE MG/DL
HGB UR QL STRIP.AUTO: ABNORMAL
HYALINE CASTS UR QL AUTO: ABNORMAL /LPF
KETONES UR QL STRIP: NEGATIVE
LEUKOCYTE ESTERASE UR QL STRIP.AUTO: ABNORMAL
NITRITE UR QL STRIP: NEGATIVE
PH UR STRIP.AUTO: 5.5 [PH] (ref 5.5–8)
POTASSIUM BLD-SCNC: 4.7 MMOL/L (ref 3.4–5)
PROT SERPL-MCNC: 7.3 G/DL (ref 6.3–8.6)
PROT UR QL STRIP: NEGATIVE
RBC # UR: ABNORMAL /HPF
REF LAB TEST METHOD: ABNORMAL
SODIUM BLD-SCNC: 138 MMOL/L (ref 137–145)
SP GR UR STRIP: 1.02 (ref 1–1.03)
SQUAMOUS #/AREA URNS HPF: ABNORMAL /HPF
UROBILINOGEN UR QL STRIP: ABNORMAL
WBC UR QL AUTO: ABNORMAL /HPF

## 2020-06-15 PROCEDURE — 81001 URINALYSIS AUTO W/SCOPE: CPT | Performed by: NURSE PRACTITIONER

## 2020-06-15 PROCEDURE — 83036 HEMOGLOBIN GLYCOSYLATED A1C: CPT | Performed by: NURSE PRACTITIONER

## 2020-06-15 PROCEDURE — 87086 URINE CULTURE/COLONY COUNT: CPT | Performed by: NURSE PRACTITIONER

## 2020-06-15 PROCEDURE — 36415 COLL VENOUS BLD VENIPUNCTURE: CPT | Performed by: NURSE PRACTITIONER

## 2020-06-15 PROCEDURE — 80053 COMPREHEN METABOLIC PANEL: CPT | Performed by: NURSE PRACTITIONER

## 2020-06-16 LAB
BACTERIA SPEC AEROBE CULT: NORMAL
HBA1C MFR BLD: 9.05 % (ref 4.8–5.6)

## 2020-06-25 ENCOUNTER — LAB (OUTPATIENT)
Dept: LAB | Facility: OTHER | Age: 65
End: 2020-06-25

## 2020-06-25 DIAGNOSIS — E11.9 TYPE 2 DIABETES MELLITUS WITHOUT COMPLICATION, WITHOUT LONG-TERM CURRENT USE OF INSULIN (HCC): Primary | ICD-10-CM

## 2020-06-25 DIAGNOSIS — I10 ESSENTIAL HYPERTENSION: ICD-10-CM

## 2020-06-25 DIAGNOSIS — E11.9 TYPE 2 DIABETES MELLITUS WITHOUT COMPLICATION, WITHOUT LONG-TERM CURRENT USE OF INSULIN (HCC): ICD-10-CM

## 2020-06-25 LAB
ALBUMIN SERPL-MCNC: 4.3 G/DL (ref 3.5–5)
ALBUMIN/GLOB SERPL: 1.4 G/DL (ref 1.1–1.8)
ALP SERPL-CCNC: 46 U/L (ref 38–126)
ALT SERPL W P-5'-P-CCNC: 20 U/L
ANION GAP SERPL CALCULATED.3IONS-SCNC: 7 MMOL/L (ref 5–15)
AST SERPL-CCNC: 28 U/L (ref 14–36)
BILIRUB SERPL-MCNC: 0.6 MG/DL (ref 0.2–1.3)
BILIRUB UR QL STRIP: NEGATIVE
BUN BLD-MCNC: 35 MG/DL (ref 7–23)
BUN/CREAT SERPL: 30.2 (ref 7–25)
CALCIUM SPEC-SCNC: 9.5 MG/DL (ref 8.4–10.2)
CHLORIDE SERPL-SCNC: 105 MMOL/L (ref 101–112)
CLARITY UR: CLEAR
CO2 SERPL-SCNC: 25 MMOL/L (ref 22–30)
COLOR UR: YELLOW
CREAT BLD-MCNC: 1.16 MG/DL (ref 0.52–1.04)
GFR SERPL CREATININE-BSD FRML MDRD: 47 ML/MIN/1.73 (ref 45–104)
GLOBULIN UR ELPH-MCNC: 3 GM/DL (ref 2.3–3.5)
GLUCOSE BLD-MCNC: 262 MG/DL (ref 70–99)
GLUCOSE UR STRIP-MCNC: ABNORMAL MG/DL
HGB UR QL STRIP.AUTO: NEGATIVE
KETONES UR QL STRIP: NEGATIVE
LEUKOCYTE ESTERASE UR QL STRIP.AUTO: NEGATIVE
NITRITE UR QL STRIP: NEGATIVE
PH UR STRIP.AUTO: <=5 [PH] (ref 5.5–8)
POTASSIUM BLD-SCNC: 5.1 MMOL/L (ref 3.4–5)
PROT SERPL-MCNC: 7.3 G/DL (ref 6.3–8.6)
PROT UR QL STRIP: NEGATIVE
SODIUM BLD-SCNC: 137 MMOL/L (ref 137–145)
SP GR UR STRIP: 1.02 (ref 1–1.03)
UROBILINOGEN UR QL STRIP: ABNORMAL

## 2020-06-25 PROCEDURE — 36415 COLL VENOUS BLD VENIPUNCTURE: CPT | Performed by: NURSE PRACTITIONER

## 2020-06-25 PROCEDURE — 81003 URINALYSIS AUTO W/O SCOPE: CPT | Performed by: NURSE PRACTITIONER

## 2020-06-25 PROCEDURE — 80053 COMPREHEN METABOLIC PANEL: CPT | Performed by: NURSE PRACTITIONER

## 2020-07-28 RX ORDER — SULFAMETHOXAZOLE AND TRIMETHOPRIM 800; 160 MG/1; MG/1
1 TABLET ORAL 2 TIMES DAILY
Qty: 14 TABLET | Refills: 5 | OUTPATIENT
Start: 2020-07-28

## 2020-08-11 RX ORDER — SIMVASTATIN 40 MG
40 TABLET ORAL
Qty: 30 TABLET | Refills: 5 | Status: SHIPPED | OUTPATIENT
Start: 2020-08-11 | End: 2020-10-27

## 2020-08-21 RX ORDER — LIRAGLUTIDE 6 MG/ML
INJECTION SUBCUTANEOUS
Qty: 6 ML | Refills: 4 | Status: SHIPPED | OUTPATIENT
Start: 2020-08-21 | End: 2021-03-23

## 2020-08-26 DIAGNOSIS — E11.65 TYPE 2 DIABETES MELLITUS WITH HYPERGLYCEMIA, WITHOUT LONG-TERM CURRENT USE OF INSULIN (HCC): ICD-10-CM

## 2020-09-10 RX ORDER — LISINOPRIL 40 MG/1
TABLET ORAL
Qty: 15 TABLET | Refills: 4 | Status: SHIPPED | OUTPATIENT
Start: 2020-09-10 | End: 2021-03-02

## 2020-10-20 ENCOUNTER — OFFICE VISIT (OUTPATIENT)
Dept: FAMILY MEDICINE CLINIC | Facility: CLINIC | Age: 65
End: 2020-10-20

## 2020-10-20 DIAGNOSIS — Z82.49 FAMILY HISTORY OF EARLY CAD: ICD-10-CM

## 2020-10-20 DIAGNOSIS — E78.5 HYPERLIPIDEMIA, UNSPECIFIED HYPERLIPIDEMIA TYPE: Chronic | ICD-10-CM

## 2020-10-20 DIAGNOSIS — R11.0 NAUSEA: ICD-10-CM

## 2020-10-20 DIAGNOSIS — M25.562 ACUTE PAIN OF LEFT KNEE: ICD-10-CM

## 2020-10-20 DIAGNOSIS — M54.41 ACUTE MIDLINE LOW BACK PAIN WITH RIGHT-SIDED SCIATICA: ICD-10-CM

## 2020-10-20 DIAGNOSIS — M79.674 TOE PAIN, RIGHT: ICD-10-CM

## 2020-10-20 DIAGNOSIS — E11.65 TYPE 2 DIABETES MELLITUS WITH HYPERGLYCEMIA, WITHOUT LONG-TERM CURRENT USE OF INSULIN (HCC): Primary | Chronic | ICD-10-CM

## 2020-10-20 DIAGNOSIS — F41.9 ANXIETY: Chronic | ICD-10-CM

## 2020-10-20 DIAGNOSIS — M79.602 LEFT ARM PAIN: ICD-10-CM

## 2020-10-20 DIAGNOSIS — I10 ESSENTIAL HYPERTENSION: Chronic | ICD-10-CM

## 2020-10-20 PROCEDURE — G2025 DIS SITE TELE SVCS RHC/FQHC: HCPCS | Performed by: NURSE PRACTITIONER

## 2020-10-20 RX ORDER — GLIPIZIDE 10 MG/1
TABLET ORAL
COMMUNITY
Start: 2020-10-10 | End: 2020-11-09

## 2020-10-20 RX ORDER — BLOOD SUGAR DIAGNOSTIC
1 STRIP MISCELLANEOUS DAILY
Qty: 50 EACH | Refills: 5 | Status: SHIPPED | OUTPATIENT
Start: 2020-10-20 | End: 2021-09-02

## 2020-10-20 NOTE — PROGRESS NOTES
"Subjective   Odessa Walker is a 65 y.o. female. Patient here today with complaints of Diabetes (1 month f/u)  Patient here today for telehealth visit for recheck of diabetes, reports frequent falls due to feeling \"off balance\".  Denies feeling dizzy or having vision changes or headache.  She does report having a family history of \"heart problems \"in mother and sisters.  She denies shortness of breath but has had occasional palpitations tachycardia nausea and left arm pain.  Patient reports fingerstick blood sugar on average running 120s to 130s.  Complaining still of left knee pain, worsening, \"giving out \".  Reports falling about 4 times in the last \"few months \".  Also complaining of lower back pain radiating to right hip.  Needing new glucometer and refill on Metformin today.  States at some point she injured her right foot, fifth digit.  Requesting referral on to podiatry, also due for diabetic foot exam.  Needing labs repeated today as well.  Also reports that she needs a new glucometer, has changed batteries and hers is no longer working    There were no vitals filed for this visit.  There is no height or weight on file to calculate BMI.  Past Medical History:   Diagnosis Date   • Arthritis     BACK, KNEES AND SHOULDER   • Benign essential hypertension    • Candidiasis of skin and nail     L foot   • Chest pain    • Chronic anemia    • Claudication (CMS/HCC)    • Corns and callosities    • Depressive disorder    • Dermatitis    • Disease of nail     other specified   • Disorder of peripheral nervous system    • Displaced fracture of third metatarsal bone, right foot, initial encounter for closed fracture    • Displaced fracture of third metatarsal bone, right foot, subsequent encounter for fracture with routine healing    • Diverticular disease of colon    • Dog bite of hand    • Epigastric pain    • Essential hypertension    • Foot pain    • GERD (gastroesophageal reflux disease)    • H/O screening " mammography    • Hallux valgus    • Heartburn    • Hyperlipidemia    • Joint pain    • Joint swelling    • Neurologic disorder associated with diabetes mellitus (CMS/HCC)     type 2   • Nondisplaced fracture of fourth metatarsal bone, right foot, initial encounter for closed fracture    • Nondisplaced fracture of fourth metatarsal bone, right foot, subsequent encounter for fracture with routine healing    • Pain in right leg    • Superficial laceration of hand    • Type 2 diabetes mellitus (CMS/HCC)    • Urinary tract infectious disease    • Venous insufficiency (chronic) (peripheral)      Diabetes  She presents for her follow-up diabetic visit. She has type 2 diabetes mellitus. Her disease course has been stable. There are no hypoglycemic associated symptoms. Pertinent negatives for diabetes include no blurred vision. There are no hypoglycemic complications. Symptoms are stable. Diabetic complications include nephropathy and peripheral neuropathy. Risk factors for coronary artery disease include dyslipidemia, diabetes mellitus, hypertension and post-menopausal. Current diabetic treatment includes diet and oral agent (dual therapy) (victoza). She is compliant with treatment most of the time. She is following a generally healthy diet. Her home blood glucose trend is decreasing steadily. Her breakfast blood glucose range is generally 130-140 mg/dl. An ACE inhibitor/angiotensin II receptor blocker is being taken.   Knee Pain   The incident occurred more than 1 week ago. The pain is present in the left knee. The quality of the pain is described as aching. The pain is moderate. The pain has been constant since onset. It is unknown if a foreign body is present. The symptoms are aggravated by weight bearing and movement. She has tried rest, NSAIDs and non-weight bearing for the symptoms. The treatment provided mild relief.   Back Pain  This is a recurrent problem. The current episode started more than 1 month ago. The  problem occurs constantly. The problem has been gradually worsening since onset. The pain is present in the lumbar spine. The quality of the pain is described as aching. The pain radiates to the right thigh. The pain is moderate. She has tried bed rest for the symptoms. The treatment provided no relief.        The following portions of the patient's history were reviewed and updated as appropriate: allergies, current medications, past family history, past medical history, past social history, past surgical history and problem list.    Review of Systems   Constitutional: Negative.    HENT: Negative.    Eyes: Negative.  Negative for blurred vision.   Respiratory: Negative.    Cardiovascular: Positive for palpitations.   Gastrointestinal: Positive for nausea.   Endocrine: Negative.    Genitourinary: Negative.    Musculoskeletal: Positive for arthralgias, back pain and gait problem.   Skin: Negative.    Allergic/Immunologic: Negative.    Hematological: Negative.    Psychiatric/Behavioral: Negative.        Objective   Physical Exam  Deferred, telephone visit  Assessment/Plan   Diagnoses and all orders for this visit:    1. Type 2 diabetes mellitus with hyperglycemia, without long-term current use of insulin (CMS/[facility]) (Primary)  -     Comprehensive metabolic panel; Future  -     Hemoglobin A1c; Future  -     Urinalysis With Culture If Indicated -; Future  -     Ambulatory Referral to Cardiology  -     glucose blood (Accu-Chek Mayr Kate Plus) test strip; 1 each by Other route Daily. use to test blood sugar once daily  Dispense: 50 each; Refill: 5  -     metFORMIN (GLUCOPHAGE) 500 MG tablet; Take 1 tablet by mouth 2 (Two) Times a Day With Meals.  Dispense: 60 tablet; Refill: 2  -     Ambulatory Referral to Podiatry  -     Lipid panel; Future  -     CBC & Differential; Future    2. Toe pain, right  -     Ambulatory Referral to Podiatry    3. Left arm pain  -     Ambulatory Referral to Cardiology    4. Nausea  -     Ambulatory  Referral to Cardiology    5. Family history of early CAD  -     Ambulatory Referral to Cardiology    6. Acute midline low back pain with right-sided sciatica  -     XR Spine Lumbar 2 or 3 View    7. Acute pain of left knee  -     XR Knee 1 or 2 View Left    8. Essential hypertension    9. Anxiety    10. Hyperlipidemia, unspecified hyperlipidemia type    You have chosen to receive care through a telephone visit. Do you consent to use a telephone visit for your medical care today? Yes  This visit has been rescheduled as a phone visit to comply with patient safety concerns in accordance with CDC recommendations. Total time of discussion was 22 minutes.  I will obtain labs as above and inform her of results via phone  Patient referred to podiatry, diabetic foot exam and complaints of right foot, fifth digit injury and pain/swelling  She is referred to cardiology for evaluation and treatment as he deems necessary for left arm pain nausea palpitations with history of diabetes  I will obtain x-ray of left knee and L-spine and inform her of results via phone, consider MRI or referral as necessary depending on results  Prescription for new glucometer sent to pharmacy for her  Pt informed will call back to schedule AWV  Encouraged flu shot

## 2020-10-21 ENCOUNTER — CLINICAL SUPPORT (OUTPATIENT)
Dept: FAMILY MEDICINE CLINIC | Facility: CLINIC | Age: 65
End: 2020-10-21

## 2020-10-21 PROCEDURE — G0008 ADMIN INFLUENZA VIRUS VAC: HCPCS | Performed by: NURSE PRACTITIONER

## 2020-10-21 PROCEDURE — 90694 VACC AIIV4 NO PRSRV 0.5ML IM: CPT | Performed by: NURSE PRACTITIONER

## 2020-10-27 ENCOUNTER — TELEPHONE (OUTPATIENT)
Dept: CARDIOLOGY | Facility: CLINIC | Age: 65
End: 2020-10-27

## 2020-10-27 ENCOUNTER — OFFICE VISIT (OUTPATIENT)
Dept: CARDIOLOGY | Facility: CLINIC | Age: 65
End: 2020-10-27

## 2020-10-27 ENCOUNTER — LAB (OUTPATIENT)
Dept: LAB | Facility: OTHER | Age: 65
End: 2020-10-27

## 2020-10-27 VITALS
HEART RATE: 89 BPM | DIASTOLIC BLOOD PRESSURE: 60 MMHG | WEIGHT: 130.6 LBS | BODY MASS INDEX: 21.76 KG/M2 | SYSTOLIC BLOOD PRESSURE: 112 MMHG | HEIGHT: 65 IN | OXYGEN SATURATION: 98 %

## 2020-10-27 DIAGNOSIS — R00.2 PALPITATIONS: Primary | ICD-10-CM

## 2020-10-27 DIAGNOSIS — R06.00 DYSPNEA, UNSPECIFIED TYPE: ICD-10-CM

## 2020-10-27 DIAGNOSIS — E78.2 MIXED HYPERLIPIDEMIA: ICD-10-CM

## 2020-10-27 DIAGNOSIS — I10 ESSENTIAL HYPERTENSION: ICD-10-CM

## 2020-10-27 DIAGNOSIS — E11.65 TYPE 2 DIABETES MELLITUS WITH HYPERGLYCEMIA, WITHOUT LONG-TERM CURRENT USE OF INSULIN (HCC): ICD-10-CM

## 2020-10-27 LAB
ALBUMIN SERPL-MCNC: 4.5 G/DL (ref 3.5–5)
ALBUMIN/GLOB SERPL: 1.5 G/DL (ref 1.1–1.8)
ALP SERPL-CCNC: 47 U/L (ref 38–126)
ALT SERPL W P-5'-P-CCNC: 21 U/L
ANION GAP SERPL CALCULATED.3IONS-SCNC: 12 MMOL/L (ref 5–15)
AST SERPL-CCNC: 30 U/L (ref 14–36)
BACTERIA UR QL AUTO: ABNORMAL /HPF
BASOPHILS # BLD AUTO: 0.04 10*3/MM3 (ref 0–0.2)
BASOPHILS NFR BLD AUTO: 0.7 % (ref 0–1.5)
BILIRUB SERPL-MCNC: 0.6 MG/DL (ref 0.2–1.3)
BILIRUB UR QL STRIP: NEGATIVE
BUN SERPL-MCNC: 30 MG/DL (ref 7–23)
BUN/CREAT SERPL: 24.2 (ref 7–25)
CALCIUM SPEC-SCNC: 10.3 MG/DL (ref 8.4–10.2)
CHLORIDE SERPL-SCNC: 102 MMOL/L (ref 101–112)
CHOLEST SERPL-MCNC: 142 MG/DL (ref 150–200)
CLARITY UR: ABNORMAL
CO2 SERPL-SCNC: 27 MMOL/L (ref 22–30)
COLOR UR: YELLOW
CREAT SERPL-MCNC: 1.24 MG/DL (ref 0.52–1.04)
DEPRECATED RDW RBC AUTO: 42.2 FL (ref 37–54)
EOSINOPHIL # BLD AUTO: 0.04 10*3/MM3 (ref 0–0.4)
EOSINOPHIL NFR BLD AUTO: 0.7 % (ref 0.3–6.2)
ERYTHROCYTE [DISTWIDTH] IN BLOOD BY AUTOMATED COUNT: 12.5 % (ref 12.3–15.4)
GFR SERPL CREATININE-BSD FRML MDRD: 43 ML/MIN/1.73 (ref 45–104)
GLOBULIN UR ELPH-MCNC: 3 GM/DL (ref 2.3–3.5)
GLUCOSE SERPL-MCNC: 178 MG/DL (ref 70–99)
GLUCOSE UR STRIP-MCNC: NEGATIVE MG/DL
HCT VFR BLD AUTO: 35.5 % (ref 34–46.6)
HDLC SERPL-MCNC: 71 MG/DL (ref 40–59)
HGB BLD-MCNC: 11.5 G/DL (ref 12–15.9)
HGB UR QL STRIP.AUTO: NEGATIVE
HYALINE CASTS UR QL AUTO: ABNORMAL /LPF
KETONES UR QL STRIP: NEGATIVE
LDLC SERPL CALC-MCNC: 58 MG/DL
LDLC/HDLC SERPL: 0.82 {RATIO} (ref 0–3.22)
LEUKOCYTE ESTERASE UR QL STRIP.AUTO: ABNORMAL
LYMPHOCYTES # BLD AUTO: 1.71 10*3/MM3 (ref 0.7–3.1)
LYMPHOCYTES NFR BLD AUTO: 29.2 % (ref 19.6–45.3)
MCH RBC QN AUTO: 31 PG (ref 26.6–33)
MCHC RBC AUTO-ENTMCNC: 32.4 G/DL (ref 31.5–35.7)
MCV RBC AUTO: 95.7 FL (ref 79–97)
MONOCYTES # BLD AUTO: 0.47 10*3/MM3 (ref 0.1–0.9)
MONOCYTES NFR BLD AUTO: 8 % (ref 5–12)
MUCOUS THREADS URNS QL MICRO: ABNORMAL /HPF
NEUTROPHILS NFR BLD AUTO: 3.59 10*3/MM3 (ref 1.7–7)
NEUTROPHILS NFR BLD AUTO: 61.4 % (ref 42.7–76)
NITRITE UR QL STRIP: NEGATIVE
PH UR STRIP.AUTO: 5.5 [PH] (ref 5.5–8)
PLATELET # BLD AUTO: 200 10*3/MM3 (ref 140–450)
PMV BLD AUTO: 9.4 FL (ref 6–12)
POTASSIUM SERPL-SCNC: 4.6 MMOL/L (ref 3.4–5)
PROT SERPL-MCNC: 7.5 G/DL (ref 6.3–8.6)
PROT UR QL STRIP: NEGATIVE
QT INTERVAL: 408 MS
QTC INTERVAL: 449 MS
RBC # BLD AUTO: 3.71 10*6/MM3 (ref 3.77–5.28)
RBC # UR: ABNORMAL /HPF
REF LAB TEST METHOD: ABNORMAL
SODIUM SERPL-SCNC: 141 MMOL/L (ref 137–145)
SP GR UR STRIP: 1.01 (ref 1–1.03)
SQUAMOUS #/AREA URNS HPF: ABNORMAL /HPF
TRIGL SERPL-MCNC: 65 MG/DL
UROBILINOGEN UR QL STRIP: ABNORMAL
VLDLC SERPL-MCNC: 13 MG/DL (ref 5–40)
WBC # BLD AUTO: 5.85 10*3/MM3 (ref 3.4–10.8)
WBC UR QL AUTO: ABNORMAL /HPF

## 2020-10-27 PROCEDURE — 83036 HEMOGLOBIN GLYCOSYLATED A1C: CPT | Performed by: NURSE PRACTITIONER

## 2020-10-27 PROCEDURE — 36415 COLL VENOUS BLD VENIPUNCTURE: CPT | Performed by: NURSE PRACTITIONER

## 2020-10-27 PROCEDURE — 99204 OFFICE O/P NEW MOD 45 MIN: CPT | Performed by: INTERNAL MEDICINE

## 2020-10-27 PROCEDURE — 81001 URINALYSIS AUTO W/SCOPE: CPT | Performed by: NURSE PRACTITIONER

## 2020-10-27 PROCEDURE — 80061 LIPID PANEL: CPT | Performed by: NURSE PRACTITIONER

## 2020-10-27 PROCEDURE — 93000 ELECTROCARDIOGRAM COMPLETE: CPT | Performed by: INTERNAL MEDICINE

## 2020-10-27 PROCEDURE — 85025 COMPLETE CBC W/AUTO DIFF WBC: CPT | Performed by: NURSE PRACTITIONER

## 2020-10-27 PROCEDURE — 80053 COMPREHEN METABOLIC PANEL: CPT | Performed by: NURSE PRACTITIONER

## 2020-10-27 RX ORDER — ROSUVASTATIN CALCIUM 10 MG/1
10 TABLET, COATED ORAL DAILY
Qty: 30 TABLET | Refills: 11 | Status: SHIPPED | OUTPATIENT
Start: 2020-10-27 | End: 2021-10-06

## 2020-10-27 RX ORDER — RANOLAZINE 500 MG/1
500 TABLET, EXTENDED RELEASE ORAL 2 TIMES DAILY
Qty: 60 TABLET | Refills: 11 | Status: SHIPPED | OUTPATIENT
Start: 2020-10-27 | End: 2021-10-06

## 2020-10-27 NOTE — TELEPHONE ENCOUNTER
Contacted pharmacy and spoke with sofía and informed her that Dr. Kapadia did  In fact stop the use of simvastatin.

## 2020-10-27 NOTE — TELEPHONE ENCOUNTER
Pharmacy is wanting to know since Dr. Kapadia has put in for Crestor do they need to take out refill for simvastatin. Also there is still a reaction with Crestor as there was with simvastatin.     She or Kimi would like a call back to see what what they need to do.    Thank you

## 2020-10-27 NOTE — PROGRESS NOTES
[facility] Cardiology  OFFICE CONSULT NOTE    Patient Name: Odessa Walker  Age/Sex: 65 y.o. female  : 1955  MRN: [id number]      Referring Provider: Kendal Mccoy, APRN  [facility] [place] [address]        Chief Complaint: Palpitations, shortness of breath and fatigue    History of Present Illness:  Odessa Walker is a 65 y.o. female patient is a very pleasant 65-year-old female.  She has a history of diabetes and hypertension.  She is noted that she has had palpitations and associated with this some shortness of breath and fatigue.  She really has no chest pain she just says it is somewhat harder to do things.  She said like when she gets up on a ladder she noticed that she does not do things quite as quickly or is harder to do.  She has some left arm pain but is really not exertional and really does not come from the chest.  It is independent of the left arm.    Last Echo:    Last Cath:      Past Medical History:  Past Medical History:   Diagnosis Date   • Arthritis     BACK, KNEES AND SHOULDER   • Benign essential hypertension    • Candidiasis of skin and nail     L foot   • Chest pain    • Chronic anemia    • Claudication (CMS/HCC)    • Corns and callosities    • Depressive disorder    • Dermatitis    • Disease of nail     other specified   • Disorder of peripheral nervous system    • Displaced fracture of third metatarsal bone, right foot, initial encounter for closed fracture    • Displaced fracture of third metatarsal bone, right foot, subsequent encounter for fracture with routine healing    • Diverticular disease of colon    • Dog bite of hand    • Epigastric pain    • Essential hypertension    • Foot pain    • GERD (gastroesophageal reflux disease)    • H/O screening mammography    • Hallux valgus    • Heartburn    • Hyperlipidemia    • Joint pain    • Joint swelling    • Neurologic disorder associated with diabetes mellitus (CMS/HCC)     type 2   •  Nondisplaced fracture of fourth metatarsal bone, right foot, initial encounter for closed fracture    • Nondisplaced fracture of fourth metatarsal bone, right foot, subsequent encounter for fracture with routine healing    • Pain in right leg    • Superficial laceration of hand    • Type 2 diabetes mellitus (CMS/HCC)    • Urinary tract infectious disease    • Venous insufficiency (chronic) (peripheral)        PAST SURGERY   Past Surgical History:   Procedure Laterality Date   • BACK SURGERY  2007   • COLONOSCOPY  11/11/2015    Diverticulosis found in the sigmoid colon. Hemorrhoids found in the anus.   • HARDWARE REMOVAL Right 7/19/2019    Procedure: excision of loose body in right knee.;  Surgeon: Pradeep Jacobs MD;  Location: [facility];  Service: Orthopedics   • KNEE SURGERY  2005   • OOPHORECTOMY     • OTHER SURGICAL HISTORY  05/06/2015    DEBRIDE NAIL 6 OR MORE    • OTHER SURGICAL HISTORY  05/06/2015    PARING CORN/CALLUS    • OTHER SURGICAL HISTORY  10/25/2015    TREAT METATARSAL FRACTURE    • UPPER GASTROINTESTINAL ENDOSCOPY  11/11/2015    Esophagitis seen.Biopsy taken.A hiatus hernia was found in the esophagus.Gastritis found in the stomach.Biopsy taken   • US VENOUS EXTREMITY UNILATERAL  01/26/2016   • VAGINAL HYSTERECTOMY SALPINGO OOPHORECTOMY  1999       Home Medications:      Current Outpatient Medications:   •  Calcium Carbonate (CALCIUM 600 PO), Take 1 tablet by mouth Daily., Disp: , Rfl:   •  Empagliflozin (Jardiance) 25 MG tablet, Take 25 mg by mouth Daily., Disp: 30 tablet, Rfl: 2  •  Flax Oil-Fish Oil-Borage Oil (FISH OIL-FLAX OIL-BORAGE OIL PO), Take 1 tablet by mouth 2 (Two) Times a Day., Disp: , Rfl:   •  fluticasone (FLONASE) 50 MCG/ACT nasal spray, USE 2 SPRAYS IN EACH NOSTRIL DAILY, Disp: 16 g, Rfl: 1  •  glipizide (GLUCOTROL) 10 MG tablet, , Disp: , Rfl:   •  glucose blood (Accu-Chek Mary Kate Plus) test strip, 1 each by Other route Daily. use to test blood sugar once daily, Disp: 50  each, Rfl: 5  •  glucose monitor monitoring kit, 1 each Daily. DX:E11.9, Disp: 1 each, Rfl: 0  •  hydrOXYzine (ATARAX) 25 MG tablet, Take 1-2 po qhs prn sleep, Disp: 60 tablet, Rfl: 0  •  Insulin Pen Needle (PEN NEEDLES) 31G X 8 MM misc, Use as instructed with victoza, Disp: 100 each, Rfl: 4  •  IRON PO, Take 65 mg by mouth Daily., Disp: , Rfl:   •  Lancets misc, Once daily, Disp: 100 each, Rfl: 12  •  lisinopril (PRINIVIL,ZESTRIL) 40 MG tablet, TAKE 1/2 TABLET BY MOUTH DAILY, Disp: 15 tablet, Rfl: 4  •  metFORMIN (GLUCOPHAGE) 500 MG tablet, Take 1 tablet by mouth 2 (Two) Times a Day With Meals., Disp: 60 tablet, Rfl: 2  •  Multiple Vitamins-Minerals (ONE-A-DAY 50 PLUS PO), Take 1 tablet by mouth Daily., Disp: , Rfl:   •  ONE TOUCH LANCETS misc, 1 applicator Daily., Disp: 200 each, Rfl: 12  •  sertraline (ZOLOFT) 50 MG tablet, TAKE 1 TABLET BY MOUTH DAILY, Disp: 60 tablet, Rfl: 5  •  sulindac (CLINORIL) 200 MG tablet, Take 200 mg by mouth 2 (Two) Times a Day., Disp: , Rfl:   •  VICTOZA 18 MG/3ML solution pen-injector injection, INJECT 1.2 MG UNDER THE SKIN INTO THE APPROPRIATE AREA AS DIRECTED DAILY., Disp: 6 mL, Rfl: 4  •  ranolazine (Ranexa) 500 MG 12 hr tablet, Take 1 tablet by mouth 2 (Two) Times a Day., Disp: 60 tablet, Rfl: 11  •  rosuvastatin (CRESTOR) 10 MG tablet, Take 1 tablet by mouth Daily., Disp: 30 tablet, Rfl: 11    Allergies:  Allergies   Allergen Reactions   • Penicillins Rash        Social History:  Social History     Socioeconomic History   • Marital status:      Spouse name: Not on file   • Number of children: Not on file   • Years of education: Not on file   • Highest education level: Not on file   Tobacco Use   • Smoking status: Former Smoker   • Smokeless tobacco: Never Used   • Tobacco comment: quit smoking >20 yr ago   Substance and Sexual Activity   • Alcohol use: No   • Drug use: No   • Sexual activity: Defer        Family History:  Family History   Problem Relation Age of Onset   •  Heart disease Mother    • Osteoarthritis Father    • Diabetes Other          Review of Systems:   Constitution: No chills, no rigors, no unexplained weight loss or weight gain    Eyes:  No diplopia, no blurred vision, no loss of vision, conjunctiva is pink and sclera is anicteric    ENT:  No tinnitus, no otorrhea, no epistaxis, no sore throat   Respiratory: No cough, no hemoptysis    Cardiovascular:  As mentioned in HPI    Gastrointestinal: No nausea, no vomiting, no hematemesis, no diarrhea or constipation, no melena    Genitourinary: No frequency of dysuria no hematuria    Integument: positive for  No pruritis and  no skin rash    Hematologic / Lymphatic: No excessive bleeding, easy bruising, fatigue, lymphadenopathy and petechiae    Musculoskeletal: No joint pain, joint stiffness, joint swelling, muscle pain, muscle weakness and neck pain    Neurological: No dizziness, headaches, light headedness, seizures and vertigo or stroke    Behavioral/Psych: No depression, or bipolar disorder    Endocrine: no h/o diabetes, hyperlipidemia or thyroid problems        Vitals:    10/27/20 1037   BP: 112/60   Pulse: 89   SpO2: 98%       Body mass index is 21.73 kg/m².          Physical Exam:   General Appearance:    Alert, oriented, cooperative, in no acute distress     Head:    Normocephalic, atraumatic, without obvious abnormality     Eyes:            Lids and lashes normal, conjunctivae and sclerae normal, no   icterus, no pallor     Ears:    Ears appear intact with no abnormalities noted     Throat:   Mucous membranes pink and moist     Neck:   Supple, trachea midline, no carotid bruit, no JVD     Lungs:     Air enty equal,  Clear to auscultation, respirations regular, Unlabored. No wheezes, rales, rhonchi    Heart:    Regular rhythm and normal rate, normal S1 and S2, no            murmur, no gallop,      Abdomen:     Normal bowel sounds, no masses, liver and spleen nonpalpable, soft, non-tender, non-distended, no guarding,  no rebound tenderness       Extremities:   Moves all extremities well, no edema, no cyanosis, no              Redness, no clubbing     Pulses:   Pulses palpable and equal bilaterally       Neurologic:   Alert and oriented to person, place, and time. No focal neurological deficits       Lab Review:     Lab on 10/27/2020   Component Date Value Ref Range Status   • Glucose 10/27/2020 178* 70 - 99 mg/dL Final   • BUN 10/27/2020 30* 7 - 23 mg/dL Final   • Creatinine 10/27/2020 1.24* 0.52 - 1.04 mg/dL Final   • Sodium 10/27/2020 141  137 - 145 mmol/L Final   • Potassium 10/27/2020 4.6  3.4 - 5.0 mmol/L Final   • Chloride 10/27/2020 102  101 - 112 mmol/L Final   • CO2 10/27/2020 27.0  22.0 - 30.0 mmol/L Final   • Calcium 10/27/2020 10.3* 8.4 - 10.2 mg/dL Final   • Total Protein 10/27/2020 7.5  6.3 - 8.6 g/dL Final   • Albumin 10/27/2020 4.50  3.50 - 5.00 g/dL Final   • ALT (SGPT) 10/27/2020 21  <=35 U/L Final   • AST (SGOT) 10/27/2020 30  14 - 36 U/L Final   • Alkaline Phosphatase 10/27/2020 47  38 - 126 U/L Final   • Total Bilirubin 10/27/2020 0.6  0.2 - 1.3 mg/dL Final   • eGFR Non African Amer 10/27/2020 43* 45 - 104 mL/min/1.73 Final   • Globulin 10/27/2020 3.0  2.3 - 3.5 gm/dL Final   • A/G Ratio 10/27/2020 1.5  1.1 - 1.8 g/dL Final   • BUN/Creatinine Ratio 10/27/2020 24.2  7.0 - 25.0 Final   • Anion Gap 10/27/2020 12.0  5.0 - 15.0 mmol/L Final   • Color, UA 10/27/2020 Yellow  Yellow, Straw Final   • Appearance, UA 10/27/2020 Slightly Cloudy* Clear Final   • pH, UA 10/27/2020 5.5  5.5 - 8.0 Final   • Specific Gravity, UA 10/27/2020 1.015  1.005 - 1.030 Final   • Glucose, UA 10/27/2020 Negative  Negative Final   • Ketones, UA 10/27/2020 Negative  Negative Final   • Bilirubin, UA 10/27/2020 Negative  Negative Final   • Blood, UA 10/27/2020 Negative  Negative Final   • Protein, UA 10/27/2020 Negative  Negative Final   • Leuk Esterase, UA 10/27/2020 Trace* Negative Final   • Nitrite, UA 10/27/2020 Negative  Negative Final    • Urobilinogen, UA 10/27/2020 0.2 E.U./dL  0.2 - 1.0 E.U./dL Final   • Total Cholesterol 10/27/2020 142* 150 - 200 mg/dL Final   • Triglycerides 10/27/2020 65  <=150 mg/dL Final   • HDL Cholesterol 10/27/2020 71* 40 - 59 mg/dL Final   • LDL Cholesterol  10/27/2020 58  <=100 mg/dL Final   • VLDL Cholesterol 10/27/2020 13  5 - 40 mg/dL Final   • LDL/HDL Ratio 10/27/2020 0.82  0.00 - 3.22 Final   • WBC 10/27/2020 5.85  3.40 - 10.80 10*3/mm3 Final   • RBC 10/27/2020 3.71* 3.77 - 5.28 10*6/mm3 Final   • Hemoglobin 10/27/2020 11.5* 12.0 - 15.9 g/dL Final   • Hematocrit 10/27/2020 35.5  34.0 - 46.6 % Final   • MCV 10/27/2020 95.7  79.0 - 97.0 fL Final   • MCH 10/27/2020 31.0  26.6 - 33.0 pg Final   • MCHC 10/27/2020 32.4  31.5 - 35.7 g/dL Final   • RDW 10/27/2020 12.5  12.3 - 15.4 % Final   • RDW-SD 10/27/2020 42.2  37.0 - 54.0 fl Final   • MPV 10/27/2020 9.4  6.0 - 12.0 fL Final   • Platelets 10/27/2020 200  140 - 450 10*3/mm3 Final   • Neutrophil % 10/27/2020 61.4  42.7 - 76.0 % Final   • Lymphocyte % 10/27/2020 29.2  19.6 - 45.3 % Final   • Monocyte % 10/27/2020 8.0  5.0 - 12.0 % Final   • Eosinophil % 10/27/2020 0.7  0.3 - 6.2 % Final   • Basophil % 10/27/2020 0.7  0.0 - 1.5 % Final   • Neutrophils, Absolute 10/27/2020 3.59  1.70 - 7.00 10*3/mm3 Final   • Lymphocytes, Absolute 10/27/2020 1.71  0.70 - 3.10 10*3/mm3 Final   • Monocytes, Absolute 10/27/2020 0.47  0.10 - 0.90 10*3/mm3 Final   • Eosinophils, Absolute 10/27/2020 0.04  0.00 - 0.40 10*3/mm3 Final   • Basophils, Absolute 10/27/2020 0.04  0.00 - 0.20 10*3/mm3 Final   • RBC, UA 10/27/2020 0-2* None Seen /HPF Final   • WBC, UA 10/27/2020 0-2* None Seen /HPF Final   • Bacteria, UA 10/27/2020 Trace* None Seen /HPF Final   • Squamous Epithelial Cells, UA 10/27/2020 0-2  None Seen, 0-2 /HPF Final   • Hyaline Casts, UA 10/27/2020 0-2  None Seen /LPF Final   • Mucus, UA 10/27/2020 Trace  None Seen, Trace /HPF Final   • Methodology 10/27/2020 Manual Light Microscopy    Final   Lab on 06/25/2020   Component Date Value Ref Range Status   • Color, UA 06/25/2020 Yellow  Yellow, Straw Final   • Appearance, UA 06/25/2020 Clear  Clear Final   • pH, UA 06/25/2020 <=5.0* 5.5 - 8.0 Final   • Specific Gravity, UA 06/25/2020 1.020  1.005 - 1.030 Final   • Glucose, UA 06/25/2020 500 mg/dL (2+)* Negative Final   • Ketones, UA 06/25/2020 Negative  Negative Final   • Bilirubin, UA 06/25/2020 Negative  Negative Final   • Blood, UA 06/25/2020 Negative  Negative Final   • Protein, UA 06/25/2020 Negative  Negative Final   • Leuk Esterase, UA 06/25/2020 Negative  Negative Final   • Nitrite, UA 06/25/2020 Negative  Negative Final   • Urobilinogen, UA 06/25/2020 0.2 E.U./dL  0.2 - 1.0 E.U./dL Final   • Glucose 06/25/2020 262* 70 - 99 mg/dL Final   • BUN 06/25/2020 35* 7 - 23 mg/dL Final   • Creatinine 06/25/2020 1.16* 0.52 - 1.04 mg/dL Final   • Sodium 06/25/2020 137  137 - 145 mmol/L Final   • Potassium 06/25/2020 5.1* 3.4 - 5.0 mmol/L Final   • Chloride 06/25/2020 105  101 - 112 mmol/L Final   • CO2 06/25/2020 25.0  22.0 - 30.0 mmol/L Final   • Calcium 06/25/2020 9.5  8.4 - 10.2 mg/dL Final   • Total Protein 06/25/2020 7.3  6.3 - 8.6 g/dL Final   • Albumin 06/25/2020 4.30  3.50 - 5.00 g/dL Final   • ALT (SGPT) 06/25/2020 20  <=35 U/L Final   • AST (SGOT) 06/25/2020 28  14 - 36 U/L Final   • Alkaline Phosphatase 06/25/2020 46  38 - 126 U/L Final   • Total Bilirubin 06/25/2020 0.6  0.2 - 1.3 mg/dL Final   • eGFR Non African Amer 06/25/2020 47  45 - 104 mL/min/1.73 Final   • Globulin 06/25/2020 3.0  2.3 - 3.5 gm/dL Final   • A/G Ratio 06/25/2020 1.4  1.1 - 1.8 g/dL Final   • BUN/Creatinine Ratio 06/25/2020 30.2* 7.0 - 25.0 Final   • Anion Gap 06/25/2020 7.0  5.0 - 15.0 mmol/L Final   Lab on 06/15/2020   Component Date Value Ref Range Status   • Glucose 06/15/2020 200* 70 - 99 mg/dL Final   • BUN 06/15/2020 33* 7 - 23 mg/dL Final   • Creatinine 06/15/2020 1.08* 0.52 - 1.04 mg/dL Final   • Sodium  06/15/2020 138  137 - 145 mmol/L Final   • Potassium 06/15/2020 4.7  3.4 - 5.0 mmol/L Final   • Chloride 06/15/2020 105  101 - 112 mmol/L Final   • CO2 06/15/2020 28.0  22.0 - 30.0 mmol/L Final   • Calcium 06/15/2020 9.8  8.4 - 10.2 mg/dL Final   • Total Protein 06/15/2020 7.3  6.3 - 8.6 g/dL Final   • Albumin 06/15/2020 4.20  3.50 - 5.00 g/dL Final   • ALT (SGPT) 06/15/2020 19  <=35 U/L Final   • AST (SGOT) 06/15/2020 28  14 - 36 U/L Final   • Alkaline Phosphatase 06/15/2020 47  38 - 126 U/L Final   • Total Bilirubin 06/15/2020 0.6  0.2 - 1.3 mg/dL Final   • eGFR Non African Amer 06/15/2020 51  45 - 104 mL/min/1.73 Final   • Globulin 06/15/2020 3.1  2.3 - 3.5 gm/dL Final   • A/G Ratio 06/15/2020 1.4  1.1 - 1.8 g/dL Final   • BUN/Creatinine Ratio 06/15/2020 30.6* 7.0 - 25.0 Final   • Anion Gap 06/15/2020 5.0  5.0 - 15.0 mmol/L Final   • Color, UA 06/15/2020 Yellow  Yellow, Straw Final   • Appearance, UA 06/15/2020 Clear  Clear Final   • pH, UA 06/15/2020 5.5  5.5 - 8.0 Final   • Specific Gravity, UA 06/15/2020 1.020  1.005 - 1.030 Final   • Glucose, UA 06/15/2020 Negative  Negative Final   • Ketones, UA 06/15/2020 Negative  Negative Final   • Bilirubin, UA 06/15/2020 Negative  Negative Final   • Blood, UA 06/15/2020 Trace* Negative Final   • Protein, UA 06/15/2020 Negative  Negative Final   • Leuk Esterase, UA 06/15/2020 Small (1+)* Negative Final   • Nitrite, UA 06/15/2020 Negative  Negative Final   • Urobilinogen, UA 06/15/2020 0.2 E.U./dL  0.2 - 1.0 E.U./dL Final   • Hemoglobin A1C 06/15/2020 9.05* 4.80 - 5.60 % Final   • RBC, UA 06/15/2020 3-5* None Seen /HPF Final   • WBC, UA 06/15/2020 6-12* None Seen /HPF Final   • Bacteria, UA 06/15/2020 Trace* None Seen /HPF Final   • Squamous Epithelial Cells, UA 06/15/2020 3-6* None Seen, 0-2 /HPF Final   • Hyaline Casts, UA 06/15/2020 None Seen  None Seen /LPF Final   • Methodology 06/15/2020 Manual Light Microscopy   Final   • Urine Culture 06/15/2020 <25,000 CFU/mL  Mixed Erika Isolated   Final   ]    Assessment/Plan     1. Palpitations  Her palpitations she feels irregularity.  She also feels skipped beat.  We will do a mobile cardiac outpatient telemetry.  We also switched assess her LV function given her multiple risk factors.  - ECG 12 Lead  - Mobile Cardiac Outpatient Telemetry; Future  - Adult Transthoracic Echo Complete W/ Cont if Necessary Per Protocol    2. Dyspnea, unspecified type  This obviously could be from her hypertension but it could be secondary to silent ischemia.  We will go ahead and place her on Ranexa and will get a 2D echo.    3. Essential hypertension  This is under good control.    4. Mixed hyperlipidemia  Given that we have started her Ranexa we will stop the simvastatin and place her on rosuvastatin.  She has just had a lipid profile which was excellent.    Patient's Body mass index is 21.73 kg/m². BMI is within normal parameters. No follow-up required..      Odessa Parker Denise  reports that she has quit smoking. She has never used smokeless tobacco..         Return in about 6 months (around 4/27/2021).

## 2020-10-28 LAB — HBA1C MFR BLD: 7.12 % (ref 4.8–5.6)

## 2020-10-28 RX ORDER — BLOOD-GLUCOSE METER
1 KIT MISCELLANEOUS DAILY
Qty: 1 EACH | Refills: 0 | Status: SHIPPED | OUTPATIENT
Start: 2020-10-28

## 2020-10-29 ENCOUNTER — OFFICE VISIT (OUTPATIENT)
Dept: FAMILY MEDICINE CLINIC | Facility: CLINIC | Age: 65
End: 2020-10-29

## 2020-10-29 DIAGNOSIS — Z00.00 MEDICARE ANNUAL WELLNESS VISIT, SUBSEQUENT: Primary | ICD-10-CM

## 2020-10-29 PROCEDURE — G0439 PPPS, SUBSEQ VISIT: HCPCS | Performed by: NURSE PRACTITIONER

## 2020-10-29 RX ORDER — ASPIRIN 81 MG/1
81 TABLET, CHEWABLE ORAL DAILY
COMMUNITY

## 2020-10-29 NOTE — PROGRESS NOTES
The ABCs of the Annual Wellness Visit  Subsequent Medicare Wellness Visit    Chief Complaint   Patient presents with   • Medicare Wellness-subsequent       Subjective   History of Present Illness:  Odessa Walker is a 65 y.o. female who presents for a Subsequent Medicare Wellness Visit.    HEALTH RISK ASSESSMENT    Recent Hospitalizations:  No hospitalization(s) within the last year.    Current Medical Providers:  Patient Care Team:  Kendal Mccoy APRN as PCP - General  Ge Elizondo DPM as Consulting Physician (Podiatry)    Smoking Status:  Social History     Tobacco Use   Smoking Status Former Smoker   Smokeless Tobacco Never Used   Tobacco Comment    quit smoking >20 yr ago       Alcohol Consumption:  Social History     Substance and Sexual Activity   Alcohol Use No       Depression Screen:   PHQ-2/PHQ-9 Depression Screening 4/11/2019   Little interest or pleasure in doing things 0   Feeling down, depressed, or hopeless 0   Total Score 0       Fall Risk Screen:  STEADI Fall Risk Assessment has not been completed.    Health Habits and Functional and Cognitive Screening:  Functional & Cognitive Status 10/29/2020   Do you have difficulty preparing food and eating? No   Do you have difficulty bathing yourself, getting dressed or grooming yourself? No   Do you have difficulty using the toilet? No   Do you have difficulty moving around from place to place? No   Do you have trouble with steps or getting out of a bed or a chair? No   Current Diet Diabetic Diet   Dental Exam Not up to date   Eye Exam Not up to date   Exercise (times per week) 3 times per week   Current Exercises Include Walking   Current Exercise Activities Include -   Do you need help using the phone?  No   Are you deaf or do you have serious difficulty hearing?  No   Do you need help with transportation? Yes   Do you need help shopping? No   Do you need help preparing meals?  No   Do you need help with housework?  No   Do you need help with  laundry? No   Do you need help taking your medications? No   Do you need help managing money? No   Do you ever drive or ride in a car without wearing a seat belt? No   Have you felt unusual stress, anger or loneliness in the last month? Yes   Who do you live with? Alone   If you need help, do you have trouble finding someone available to you? No   Have you been bothered in the last four weeks by sexual problems? No   Do you have difficulty concentrating, remembering or making decisions? Yes         Does the patient have evidence of cognitive impairment? No    Asprin use counseling:Taking ASA appropriately as indicated    Age-appropriate Screening Schedule:  Refer to the list below for future screening recommendations based on patient's age, sex and/or medical conditions. Orders for these recommended tests are listed in the plan section. The patient has been provided with a written plan.    Health Maintenance   Topic Date Due   • ZOSTER VACCINE (3 of 3) 05/17/2017   • DIABETIC EYE EXAM  12/13/2017   • DIABETIC FOOT EXAM  03/20/2018   • URINE MICROALBUMIN  08/16/2020   • HEMOGLOBIN A1C  04/27/2021   • LIPID PANEL  10/27/2021   • MAMMOGRAM  02/19/2022   • COLONOSCOPY  11/11/2025   • TDAP/TD VACCINES (2 - Td) 05/31/2026   • INFLUENZA VACCINE  Completed          The following portions of the patient's history were reviewed and updated as appropriate: allergies, current medications, past family history, past medical history, past social history, past surgical history and problem list.    Outpatient Medications Prior to Visit   Medication Sig Dispense Refill   • aspirin 81 MG chewable tablet Chew 81 mg Daily.     • Calcium Carbonate (CALCIUM 600 PO) Take 1 tablet by mouth Daily.     • Empagliflozin (Jardiance) 25 MG tablet Take 25 mg by mouth Daily. 30 tablet 2   • Flax Oil-Fish Oil-Borage Oil (FISH OIL-FLAX OIL-BORAGE OIL PO) Take 1 tablet by mouth 2 (Two) Times a Day.     • fluticasone (FLONASE) 50 MCG/ACT nasal spray  USE 2 SPRAYS IN EACH NOSTRIL DAILY 16 g 1   • glipizide (GLUCOTROL) 10 MG tablet      • glucose blood (Accu-Chek Mary Kate Plus) test strip 1 each by Other route Daily. use to test blood sugar once daily 50 each 5   • glucose monitor monitoring kit 1 each Daily. DX:E11.9 1 each 0   • glucose monitor monitoring kit 1 each Daily. DX E11.9 1 each 0   • hydrOXYzine (ATARAX) 25 MG tablet Take 1-2 po qhs prn sleep 60 tablet 0   • Insulin Pen Needle (PEN NEEDLES) 31G X 8 MM misc Use as instructed with victoza 100 each 4   • IRON PO Take 65 mg by mouth Daily.     • Lancets misc Once daily 100 each 12   • lisinopril (PRINIVIL,ZESTRIL) 40 MG tablet TAKE 1/2 TABLET BY MOUTH DAILY 15 tablet 4   • metFORMIN (GLUCOPHAGE) 500 MG tablet Take 1 tablet by mouth 2 (Two) Times a Day With Meals. 60 tablet 2   • Multiple Vitamins-Minerals (ONE-A-DAY 50 PLUS PO) Take 1 tablet by mouth Daily.     • ONE TOUCH LANCETS misc 1 applicator Daily. 200 each 12   • ranolazine (Ranexa) 500 MG 12 hr tablet Take 1 tablet by mouth 2 (Two) Times a Day. 60 tablet 11   • rosuvastatin (CRESTOR) 10 MG tablet Take 1 tablet by mouth Daily. 30 tablet 11   • sertraline (ZOLOFT) 50 MG tablet TAKE 1 TABLET BY MOUTH DAILY 60 tablet 5   • sulindac (CLINORIL) 200 MG tablet Take 200 mg by mouth 2 (Two) Times a Day.     • VICTOZA 18 MG/3ML solution pen-injector injection INJECT 1.2 MG UNDER THE SKIN INTO THE APPROPRIATE AREA AS DIRECTED DAILY. 6 mL 4     No facility-administered medications prior to visit.        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 Patient does not have an advance directive, information provided.    Review of Systems    Compared to one year ago, the patient feels her physical health is worse.  Compared to one year ago, the patient feels her mental health is the same.    Reviewed chart for potential of high risk medication in the elderly: yes  Reviewed chart for potential of harmful drug interactions in the elderly:yes    Objective       There were no vitals filed for this visit.    There is no height or weight on file to calculate BMI.  Discussed the patient's BMI with her. The BMI is in the acceptable range.    Physical Exam    Lab Results   Component Value Date    TRIG 65 10/27/2020    HDL 71 (H) 10/27/2020    LDL 58 10/27/2020    VLDL 13 10/27/2020    HGBA1C 7.12 (H) 10/27/2020        Assessment/Plan   Medicare Risks and Personalized Health Plan  CMS Preventative Services Quick Reference  Advance Directive Discussion  Cardiovascular risk  Dementia/Memory   Depression/Dysphoria  Fall Risk  Immunizations Discussed/Encouraged (specific immunizations; Shingrix )  Inadequate Social Support, Isolation, Loneliness, Lack of Transportation, Financial Difficulties, or Caregiver Stress   Osteoprorosis Risk  Polypharmacy    The above risks/problems have been discussed with the patient.  Pertinent information has been shared with the patient in the After Visit Summary.  Follow up plans and orders are seen below in the Assessment/Plan Section.    Diagnoses and all orders for this visit:    1. Medicare annual wellness visit, subsequent (Primary)    Other orders  -     Zoster Vac Recomb Adjuvanted 50 MCG/0.5ML reconstituted suspension; Inject 0.5 mL into the appropriate muscle as directed by prescriber 1 (One) Time for 1 dose.  Dispense: 1 each; Refill: 1      Follow Up:  Return in about 6 months (around  4/29/2021), or if symptoms worsen or fail to improve, for Recheck, or sooner as needed.     An After Visit Summary and PPPS were given to the patient.     You have chosen to receive care through a telephone visit. Do you consent to use a telephone visit for your medical care today? Yes  This visit has been rescheduled as a phone visit to comply with patient safety concerns in accordance with CDC recommendations. Total time of discussion was 16 minutes.

## 2020-11-02 DIAGNOSIS — M54.50 LOW BACK PAIN, UNSPECIFIED BACK PAIN LATERALITY, UNSPECIFIED CHRONICITY, UNSPECIFIED WHETHER SCIATICA PRESENT: ICD-10-CM

## 2020-11-02 DIAGNOSIS — M25.569 KNEE PAIN, UNSPECIFIED CHRONICITY, UNSPECIFIED LATERALITY: Primary | ICD-10-CM

## 2020-11-02 DIAGNOSIS — R79.9 ELEVATED BUN: ICD-10-CM

## 2020-11-09 RX ORDER — GLIPIZIDE 10 MG/1
TABLET ORAL
Qty: 60 TABLET | Refills: 5 | Status: SHIPPED | OUTPATIENT
Start: 2020-11-09 | End: 2021-04-22

## 2020-11-12 DIAGNOSIS — R93.89 ABNORMAL MRI: Primary | ICD-10-CM

## 2020-11-19 ENCOUNTER — TELEPHONE (OUTPATIENT)
Dept: CARDIOLOGY | Facility: CLINIC | Age: 65
End: 2020-11-19

## 2020-11-19 LAB
BH CV ECHO MEAS - ACS: 1.6 CM
BH CV ECHO MEAS - AO MAX PG (FULL): 2 MMHG
BH CV ECHO MEAS - AO MAX PG: 5.3 MMHG
BH CV ECHO MEAS - AO MEAN PG (FULL): 1 MMHG
BH CV ECHO MEAS - AO MEAN PG: 2 MMHG
BH CV ECHO MEAS - AO ROOT AREA (BSA CORRECTED): 1.9
BH CV ECHO MEAS - AO ROOT AREA: 8 CM^2
BH CV ECHO MEAS - AO ROOT DIAM: 3.2 CM
BH CV ECHO MEAS - AO V2 MAX: 115 CM/SEC
BH CV ECHO MEAS - AO V2 MEAN: 69.4 CM/SEC
BH CV ECHO MEAS - AO V2 VTI: 20.3 CM
BH CV ECHO MEAS - ASC AORTA: 2.6 CM
BH CV ECHO MEAS - AVA(I,A): 2.8 CM^2
BH CV ECHO MEAS - AVA(I,D): 2.8 CM^2
BH CV ECHO MEAS - AVA(V,A): 2.5 CM^2
BH CV ECHO MEAS - AVA(V,D): 2.5 CM^2
BH CV ECHO MEAS - BSA(HAYCOCK): 1.6 M^2
BH CV ECHO MEAS - BSA: 1.6 M^2
BH CV ECHO MEAS - BZI_BMI: 21.6 KILOGRAMS/M^2
BH CV ECHO MEAS - BZI_METRIC_HEIGHT: 165.1 CM
BH CV ECHO MEAS - BZI_METRIC_WEIGHT: 59 KG
BH CV ECHO MEAS - EDV(CUBED): 53.2 ML
BH CV ECHO MEAS - EDV(MOD-SP2): 64.8 ML
BH CV ECHO MEAS - EDV(MOD-SP4): 88.8 ML
BH CV ECHO MEAS - EDV(TEICH): 60.4 ML
BH CV ECHO MEAS - EF(CUBED): 71.7 %
BH CV ECHO MEAS - EF(MOD-SP2): 64.5 %
BH CV ECHO MEAS - EF(MOD-SP4): 59.9 %
BH CV ECHO MEAS - EF(TEICH): 64.1 %
BH CV ECHO MEAS - ESV(CUBED): 15.1 ML
BH CV ECHO MEAS - ESV(MOD-SP2): 23 ML
BH CV ECHO MEAS - ESV(MOD-SP4): 35.6 ML
BH CV ECHO MEAS - ESV(TEICH): 21.7 ML
BH CV ECHO MEAS - FS: 34.3 %
BH CV ECHO MEAS - IVS/LVPW: 0.85
BH CV ECHO MEAS - IVSD: 1 CM
BH CV ECHO MEAS - LA DIMENSION: 3.6 CM
BH CV ECHO MEAS - LA/AO: 1.1
BH CV ECHO MEAS - LV DIASTOLIC VOL/BSA (35-75): 53.9 ML/M^2
BH CV ECHO MEAS - LV MASS(C)D: 130.7 GRAMS
BH CV ECHO MEAS - LV MASS(C)DI: 79.4 GRAMS/M^2
BH CV ECHO MEAS - LV MAX PG: 3.3 MMHG
BH CV ECHO MEAS - LV MEAN PG: 1 MMHG
BH CV ECHO MEAS - LV SYSTOLIC VOL/BSA (12-30): 21.6 ML/M^2
BH CV ECHO MEAS - LV V1 MAX: 90.9 CM/SEC
BH CV ECHO MEAS - LV V1 MEAN: 53.9 CM/SEC
BH CV ECHO MEAS - LV V1 VTI: 18 CM
BH CV ECHO MEAS - LVIDD: 3.8 CM
BH CV ECHO MEAS - LVIDS: 2.5 CM
BH CV ECHO MEAS - LVLD AP2: 7.3 CM
BH CV ECHO MEAS - LVLD AP4: 7.7 CM
BH CV ECHO MEAS - LVLS AP2: 6.1 CM
BH CV ECHO MEAS - LVLS AP4: 6.8 CM
BH CV ECHO MEAS - LVOT AREA (M): 3.1 CM^2
BH CV ECHO MEAS - LVOT AREA: 3.1 CM^2
BH CV ECHO MEAS - LVOT DIAM: 2 CM
BH CV ECHO MEAS - LVPWD: 1.2 CM
BH CV ECHO MEAS - MR MAX PG: 8.1 MMHG
BH CV ECHO MEAS - MR MAX VEL: 142 CM/SEC
BH CV ECHO MEAS - MV A MAX VEL: 79.5 CM/SEC
BH CV ECHO MEAS - MV DEC SLOPE: 237 CM/SEC^2
BH CV ECHO MEAS - MV E MAX VEL: 48 CM/SEC
BH CV ECHO MEAS - MV E/A: 0.6
BH CV ECHO MEAS - MV MAX PG: 4.1 MMHG
BH CV ECHO MEAS - MV MEAN PG: 1 MMHG
BH CV ECHO MEAS - MV P1/2T MAX VEL: 65.1 CM/SEC
BH CV ECHO MEAS - MV P1/2T: 80.5 MSEC
BH CV ECHO MEAS - MV V2 MAX: 101 CM/SEC
BH CV ECHO MEAS - MV V2 MEAN: 44.8 CM/SEC
BH CV ECHO MEAS - MV V2 VTI: 23.9 CM
BH CV ECHO MEAS - MVA P1/2T LCG: 3.4 CM^2
BH CV ECHO MEAS - MVA(P1/2T): 2.7 CM^2
BH CV ECHO MEAS - MVA(VTI): 2.4 CM^2
BH CV ECHO MEAS - PA MAX PG (FULL): 3.4 MMHG
BH CV ECHO MEAS - PA MAX PG: 4.6 MMHG
BH CV ECHO MEAS - PA MEAN PG (FULL): 1 MMHG
BH CV ECHO MEAS - PA MEAN PG: 2 MMHG
BH CV ECHO MEAS - PA V2 MAX: 107 CM/SEC
BH CV ECHO MEAS - PA V2 MEAN: 71.3 CM/SEC
BH CV ECHO MEAS - PA V2 VTI: 19.5 CM
BH CV ECHO MEAS - PVA(I,A): 3.8 CM^2
BH CV ECHO MEAS - PVA(I,D): 3.8 CM^2
BH CV ECHO MEAS - PVA(V,A): 4.8 CM^2
BH CV ECHO MEAS - PVA(V,D): 4.8 CM^2
BH CV ECHO MEAS - QP/QS: 1.3
BH CV ECHO MEAS - RAP SYSTOLE: 10 MMHG
BH CV ECHO MEAS - RV MAX PG: 1.2 MMHG
BH CV ECHO MEAS - RV MEAN PG: 1 MMHG
BH CV ECHO MEAS - RV V1 MAX: 53.9 CM/SEC
BH CV ECHO MEAS - RV V1 MEAN: 36.7 CM/SEC
BH CV ECHO MEAS - RV V1 VTI: 7.7 CM
BH CV ECHO MEAS - RVOT AREA: 9.6 CM^2
BH CV ECHO MEAS - RVOT DIAM: 3.5 CM
BH CV ECHO MEAS - RVSP: 27 MMHG
BH CV ECHO MEAS - SI(AO): 99.1 ML/M^2
BH CV ECHO MEAS - SI(CUBED): 23.1 ML/M^2
BH CV ECHO MEAS - SI(LVOT): 34.3 ML/M^2
BH CV ECHO MEAS - SI(MOD-SP2): 25.4 ML/M^2
BH CV ECHO MEAS - SI(MOD-SP4): 32.3 ML/M^2
BH CV ECHO MEAS - SI(TEICH): 23.5 ML/M^2
BH CV ECHO MEAS - SV(AO): 163.3 ML
BH CV ECHO MEAS - SV(CUBED): 38.1 ML
BH CV ECHO MEAS - SV(LVOT): 56.5 ML
BH CV ECHO MEAS - SV(MOD-SP2): 41.8 ML
BH CV ECHO MEAS - SV(MOD-SP4): 53.2 ML
BH CV ECHO MEAS - SV(RVOT): 74.5 ML
BH CV ECHO MEAS - SV(TEICH): 38.7 ML
BH CV ECHO MEAS - TR MAX VEL: 206 CM/SEC
MAXIMAL PREDICTED HEART RATE: 154 BPM
STRESS TARGET HR: 131 BPM

## 2020-11-19 NOTE — TELEPHONE ENCOUNTER
Called patient to let her know the results, there was no answer       ----- Message from Leslie Kapadia MD sent at 11/19/2020  4:22 PM CST -----  Regarding: Echo  Tell her her echo looks good.  ----- Message -----  From: Leslie Kapadia MD  Sent: 11/19/2020  12:21 PM CST  To: Leslie Kapadia MD

## 2020-11-19 NOTE — TELEPHONE ENCOUNTER
Gave results to patient, she voiced understanding     ----- Message from eLslie Kapadia MD sent at 11/19/2020  4:22 PM CST -----  Regarding: Echo  Tell her her echo looks good.  ----- Message -----  From: Leslie Kapadia MD  Sent: 11/19/2020  12:21 PM CST  To: Leslie Kapadia MD

## 2020-11-25 RX ORDER — FLUTICASONE PROPIONATE 50 MCG
2 SPRAY, SUSPENSION (ML) NASAL DAILY
Qty: 16 G | Refills: 1 | Status: SHIPPED | OUTPATIENT
Start: 2020-11-25 | End: 2021-08-30

## 2020-12-04 ENCOUNTER — OFFICE VISIT (OUTPATIENT)
Dept: ORTHOPEDIC SURGERY | Facility: CLINIC | Age: 65
End: 2020-12-04

## 2020-12-04 VITALS — BODY MASS INDEX: 21.66 KG/M2 | HEIGHT: 65 IN | WEIGHT: 130 LBS

## 2020-12-04 DIAGNOSIS — M17.12 PRIMARY OSTEOARTHRITIS OF LEFT KNEE: ICD-10-CM

## 2020-12-04 DIAGNOSIS — M25.562 LEFT KNEE PAIN, UNSPECIFIED CHRONICITY: Primary | ICD-10-CM

## 2020-12-04 PROCEDURE — 99213 OFFICE O/P EST LOW 20 MIN: CPT | Performed by: ORTHOPAEDIC SURGERY

## 2020-12-04 NOTE — PROGRESS NOTES
Odessa Walker is a 65 y.o. female   Primary provider:  Kendal Mccoy APRN       Chief Complaint   Patient presents with   • Left Knee - Pain   • Establish Care       HISTORY OF PRESENT ILLNESS: Patient being seen for left knee pain due to fall occurring about 2 years ago. X-rays done at .  He wants to give way under quite a bit.  He saw her over a year ago with regard to her right knee after Dr. Jacobs surgery.  The right knee is doing okay but she feels the left knee causes her to buckle and give way it is had no recent treatment for this.    Pain  This is a chronic problem. The current episode started more than 1 year ago (2 years ago). Associated symptoms include joint swelling. Pertinent negatives include no abdominal pain, chest pain, chills, fever, nausea or vomiting. Associated symptoms comments: Shooting, swelling, giving way, clicking. The symptoms are aggravated by walking and standing (climbing stairs). She has tried ice, acetaminophen and NSAIDs for the symptoms.        CONCURRENT MEDICAL HISTORY:    Past Medical History:   Diagnosis Date   • Arthritis     BACK, KNEES AND SHOULDER   • Arthritis    • Back pain    • Benign essential hypertension    • Candidiasis of skin and nail     L foot   • Chest pain    • Chronic anemia    • Claudication (CMS/HCC)    • Corns and callosities    • Depressive disorder    • Dermatitis    • Disease of nail     other specified   • Disorder of peripheral nervous system    • Displaced fracture of third metatarsal bone, right foot, initial encounter for closed fracture    • Displaced fracture of third metatarsal bone, right foot, subsequent encounter for fracture with routine healing    • Diverticular disease of colon    • Dog bite of hand    • Epigastric pain    • Essential hypertension    • Foot pain    • GERD (gastroesophageal reflux disease)    • H/O screening mammography    • Hallux valgus    • Heartburn    • Hyperlipidemia    • Hypertension    • Joint pain    •  Joint swelling    • Neurologic disorder associated with diabetes mellitus (CMS/HCC)     type 2   • Nondisplaced fracture of fourth metatarsal bone, right foot, initial encounter for closed fracture    • Nondisplaced fracture of fourth metatarsal bone, right foot, subsequent encounter for fracture with routine healing    • Osteoporosis    • Pain in right leg    • Superficial laceration of hand    • Type 2 diabetes mellitus (CMS/HCC)    • Urinary tract infectious disease    • Venous insufficiency (chronic) (peripheral)        Allergies   Allergen Reactions   • Penicillins Rash         Current Outpatient Medications:   •  aspirin 81 MG chewable tablet, Chew 81 mg Daily., Disp: , Rfl:   •  Calcium Carbonate (CALCIUM 600 PO), Take 1 tablet by mouth Daily., Disp: , Rfl:   •  Empagliflozin (Jardiance) 25 MG tablet, Take 25 mg by mouth Daily., Disp: 30 tablet, Rfl: 2  •  Flax Oil-Fish Oil-Borage Oil (FISH OIL-FLAX OIL-BORAGE OIL PO), Take 1 tablet by mouth 2 (Two) Times a Day., Disp: , Rfl:   •  fluticasone (FLONASE) 50 MCG/ACT nasal spray, 2 sprays by Each Nare route Daily. Ms. Felton is covering for Ms. Mccoy., Disp: 16 g, Rfl: 1  •  glipizide (GLUCOTROL) 10 MG tablet, TAKE 1 TABLET BY MOUTH TWO TIMES A DAY BEFORE MEALS **PW OUTLIER 2/4/20**, Disp: 60 tablet, Rfl: 5  •  Glucosamine HCl (GLUCOSAMINE PO), Take 1,000 mg by mouth., Disp: , Rfl:   •  glucose blood (Accu-Chek Mary Kate Plus) test strip, 1 each by Other route Daily. use to test blood sugar once daily, Disp: 50 each, Rfl: 5  •  glucose monitor monitoring kit, 1 each Daily. DX:E11.9, Disp: 1 each, Rfl: 0  •  glucose monitor monitoring kit, 1 each Daily. DX E11.9, Disp: 1 each, Rfl: 0  •  hydrOXYzine (ATARAX) 25 MG tablet, Take 1-2 po qhs prn sleep, Disp: 60 tablet, Rfl: 0  •  Insulin Pen Needle (PEN NEEDLES) 31G X 8 MM misc, Use as instructed with victoza, Disp: 100 each, Rfl: 4  •  IRON PO, Take 65 mg by mouth Daily., Disp: , Rfl:   •  Lancets misc, Once daily, Disp:  100 each, Rfl: 12  •  lisinopril (PRINIVIL,ZESTRIL) 40 MG tablet, TAKE 1/2 TABLET BY MOUTH DAILY, Disp: 15 tablet, Rfl: 4  •  metFORMIN (GLUCOPHAGE) 500 MG tablet, Take 1 tablet by mouth 2 (Two) Times a Day With Meals., Disp: 60 tablet, Rfl: 2  •  Multiple Vitamins-Minerals (ONE-A-DAY 50 PLUS PO), Take 1 tablet by mouth Daily., Disp: , Rfl:   •  ONE TOUCH LANCETS misc, 1 applicator Daily., Disp: 200 each, Rfl: 12  •  ranolazine (Ranexa) 500 MG 12 hr tablet, Take 1 tablet by mouth 2 (Two) Times a Day., Disp: 60 tablet, Rfl: 11  •  rosuvastatin (CRESTOR) 10 MG tablet, Take 1 tablet by mouth Daily., Disp: 30 tablet, Rfl: 11  •  sertraline (ZOLOFT) 50 MG tablet, TAKE 1 TABLET BY MOUTH DAILY, Disp: 60 tablet, Rfl: 5  •  sulindac (CLINORIL) 200 MG tablet, Take 200 mg by mouth 2 (Two) Times a Day., Disp: , Rfl:   •  VICTOZA 18 MG/3ML solution pen-injector injection, INJECT 1.2 MG UNDER THE SKIN INTO THE APPROPRIATE AREA AS DIRECTED DAILY., Disp: 6 mL, Rfl: 4    Past Surgical History:   Procedure Laterality Date   • BACK SURGERY  2007   • COLONOSCOPY  11/11/2015    Diverticulosis found in the sigmoid colon. Hemorrhoids found in the anus.   • HARDWARE REMOVAL Right 7/19/2019    Procedure: excision of loose body in right knee.;  Surgeon: Pradeep Jacobs MD;  Location: [facility];  Service: Orthopedics   • KNEE SURGERY Right 2005   • OOPHORECTOMY     • OTHER SURGICAL HISTORY  05/06/2015    DEBRIDE NAIL 6 OR MORE    • OTHER SURGICAL HISTORY  05/06/2015    PARING CORN/CALLUS    • OTHER SURGICAL HISTORY  10/25/2015    TREAT METATARSAL FRACTURE    • UPPER GASTROINTESTINAL ENDOSCOPY  11/11/2015    Esophagitis seen.Biopsy taken.A hiatus hernia was found in the esophagus.Gastritis found in the stomach.Biopsy taken   • US VENOUS EXTREMITY UNILATERAL  01/26/2016   • VAGINAL HYSTERECTOMY SALPINGO OOPHORECTOMY  1999       Family History   Problem Relation Age of Onset   • Heart disease Mother    • Osteoarthritis Father    •  "Diabetes Other    • Hypertension Other    • Kidney disease Other        Social History     Socioeconomic History   • Marital status:      Spouse name: Not on file   • Number of children: Not on file   • Years of education: Not on file   • Highest education level: Not on file   Tobacco Use   • Smoking status: Former Smoker     Packs/day: 0.50     Years: 8.00     Pack years: 4.00     Types: Cigarettes   • Smokeless tobacco: Never Used   • Tobacco comment: quit smoking >20 yr ago   Substance and Sexual Activity   • Alcohol use: No   • Drug use: No   • Sexual activity: Defer        Review of Systems   Constitutional: Negative for chills and fever.        Night sweats   HENT: Positive for dental problem and hearing loss. Negative for facial swelling.    Respiratory: Negative for apnea and shortness of breath.    Cardiovascular: Negative for chest pain and leg swelling.   Gastrointestinal: Negative for abdominal pain, nausea and vomiting.   Genitourinary: Negative for dysuria.   Musculoskeletal: Positive for back pain and joint swelling.        Leg swelling   Skin: Negative for color change.        Itching   Neurological: Negative for seizures and syncope.   Hematological: Negative for adenopathy.   Psychiatric/Behavioral: Positive for sleep disturbance. Negative for dysphoric mood.   All other systems reviewed and are negative.        PHYSICAL EXAMINATION:       Ht 165.1 cm (65\")   Wt 59 kg (130 lb)   BMI 21.63 kg/m²     Physical Exam  Constitutional:       Appearance: She is well-developed.   HENT:      Head: Normocephalic and atraumatic.   Eyes:      Pupils: Pupils are equal, round, and reactive to light.   Neck:      Musculoskeletal: Neck supple.   Pulmonary:      Effort: Pulmonary effort is normal.   Musculoskeletal:         General: Swelling, tenderness and deformity present.   Skin:     General: Skin is warm and dry.   Neurological:      Mental Status: She is alert and oriented to person, place, and time. "         GAIT:     []  Normal  [x]  Antalgic    Assistive device: []  None  []  Walker     []  Crutches  [x]  Cane     []  Wheelchair  []  Stretcher    Ortho Exam  Significant valgus deformity does have full extension flexion is limited.  1-2+ effusion.  Calf is negative neurovascular intact distally.        No results found.  XR KNEE 1-2 VIEWS: 10/27/2020 9:51 AM CDT     CLINICAL HISTORY:  Knee pain.     COMPARISON: None available.     FINDINGS:  There is severe tricompartment degenerative changes most  significant in the lateral compartment there is a moderate  suprapatellar effusion.     IMPRESSION:  There is severe tricompartment degenerative changes most  significant in the lateral compartment there is a moderate  suprapatellar effusion.              Electronically signed by:  Scott Mishra MD  10/27/2020 4:10 PM CDT  Workstation: [id number]  Reviewed her x-rays including x-ray from a year ago she has severe valgus deformity with bone-on-bone laterally and significant deformity.  No obvious acute findings  ASSESSMENT:    Diagnoses and all orders for this visit:    Left knee pain, unspecified chronicity  -     Miscellaneous DME    Primary osteoarthritis of left knee    Other orders  -     Glucosamine HCl (GLUCOSAMINE PO); Take 1,000 mg by mouth.          PLAN I talked about options here.  We will get her a hinged knee brace that may give her some more stability to keep her from falling we also discussed injection she was to hold off on that if the brace does not work we will inject and have also discussed and shown her x-rays.  She does have severe osteoarthritis we briefly discussed knee replacement she will let me know we will see her back in a month or so she still hurting consider injection    Patient's Body mass index is 21.63 kg/m². BMI is within normal parameters. No follow-up required..      No follow-ups on file.      This document has been electronically signed by Chris Davies MD on December 5,  2020 07:22 CST

## 2020-12-05 PROBLEM — M17.12 PRIMARY OSTEOARTHRITIS OF LEFT KNEE: Status: ACTIVE | Noted: 2020-12-05

## 2020-12-30 DIAGNOSIS — E11.65 TYPE 2 DIABETES MELLITUS WITH HYPERGLYCEMIA, WITHOUT LONG-TERM CURRENT USE OF INSULIN (HCC): Chronic | ICD-10-CM

## 2021-01-20 DIAGNOSIS — Z12.31 SCREENING MAMMOGRAM, ENCOUNTER FOR: Primary | ICD-10-CM

## 2021-01-29 DIAGNOSIS — E11.65 TYPE 2 DIABETES MELLITUS WITH HYPERGLYCEMIA, WITHOUT LONG-TERM CURRENT USE OF INSULIN (HCC): Chronic | ICD-10-CM

## 2021-02-10 ENCOUNTER — OFFICE VISIT (OUTPATIENT)
Dept: FAMILY MEDICINE CLINIC | Facility: CLINIC | Age: 66
End: 2021-02-10

## 2021-02-10 DIAGNOSIS — R79.9 ELEVATED BUN: ICD-10-CM

## 2021-02-10 DIAGNOSIS — I10 ESSENTIAL HYPERTENSION: Chronic | ICD-10-CM

## 2021-02-10 DIAGNOSIS — R79.89 ELEVATED SERUM CREATININE: ICD-10-CM

## 2021-02-10 DIAGNOSIS — E11.65 TYPE 2 DIABETES MELLITUS WITH HYPERGLYCEMIA, WITHOUT LONG-TERM CURRENT USE OF INSULIN (HCC): Chronic | ICD-10-CM

## 2021-02-10 DIAGNOSIS — E11.65 TYPE 2 DIABETES MELLITUS WITH HYPERGLYCEMIA, WITHOUT LONG-TERM CURRENT USE OF INSULIN (HCC): Primary | Chronic | ICD-10-CM

## 2021-02-10 PROCEDURE — G2025 DIS SITE TELE SVCS RHC/FQHC: HCPCS | Performed by: NURSE PRACTITIONER

## 2021-02-10 RX ORDER — EMPAGLIFLOZIN 25 MG/1
25 TABLET, FILM COATED ORAL DAILY
Qty: 30 TABLET | Refills: 2 | Status: SHIPPED | OUTPATIENT
Start: 2021-02-10 | End: 2021-05-19

## 2021-02-10 NOTE — PROGRESS NOTES
Chief Complaint  Medication Problem    Subjective    Patient here today for telehealth visit for complaints of elevated kidney function test, saw nephrology in December and says that her levels were elevated more, states that she has a 45% kidney function, feels like this could be medication related, is concerned that it could be Victoza causing this.  Incidentally she is on Metformin and continues on glipizide but is not taking Jardiance that is on her medication list.  Reports blood sugars have improved and are running 1 30-1 40 this month on average, this morning 137.  She also has hypertension.  Was supposed to have see nephrology today and is actually going to have a telehealth visit with him today as well.        Odessa Walekr presents to [facility]  Diabetes  She presents for her follow-up diabetic visit. She has type 2 diabetes mellitus. Her disease course has been improving. There are no hypoglycemic associated symptoms. Pertinent negatives for hypoglycemia include no headaches. There are no diabetic associated symptoms. Pertinent negatives for diabetes include no chest pain. There are no hypoglycemic complications. Symptoms are stable. Diabetic complications include nephropathy. Risk factors for coronary artery disease include hypertension, diabetes mellitus, post-menopausal and sedentary lifestyle. Current diabetic treatment includes diet and oral agent (dual therapy) (Victoza daily). She is compliant with treatment most of the time. She is following a generally healthy diet. Her breakfast blood glucose range is generally 130-140 mg/dl. An ACE inhibitor/angiotensin II receptor blocker is being taken.   Hypertension  This is a chronic problem. The current episode started more than 1 year ago. The problem is unchanged. Pertinent negatives include no chest pain, headaches or shortness of breath. Risk factors for coronary artery disease include diabetes mellitus,  post-menopausal state and sedentary lifestyle. Past treatments include lifestyle changes and ACE inhibitors. Current antihypertension treatment includes lifestyle changes and ACE inhibitors. Compliance problems include exercise, diet and psychosocial issues.        Review of Systems   Respiratory: Negative for shortness of breath.    Cardiovascular: Negative for chest pain.     Objective   Vital Signs:   There were no vitals taken for this visit.    Physical Exam   Deferred, telephone visit   Result Review :     Common labs    Common Labsle 6/15/20 6/15/20 6/25/20 10/27/20 10/27/20 10/27/20 10/27/20    1037 1037  1007 1007 1007 1007   Glucose 200 (A)  262 (A)  178 (A)     BUN 33 (A)  35 (A)  30 (A)     Creatinine 1.08 (A)  1.16 (A)  1.24 (A)     eGFR Non  Am 51  47  43 (A)     Sodium 138  137  141     Potassium 4.7  5.1 (A)  4.6     Chloride 105  105  102     Calcium 9.8  9.5  10.3 (A)     Albumin 4.20  4.30  4.50     Total Bilirubin 0.6  0.6  0.6     Alkaline Phosphatase 47  46  47     AST (SGOT) 28  28  30     ALT (SGPT) 19  20  21     WBC    5.85      Hemoglobin    11.5 (A)      Hematocrit    35.5      Platelets    200      Total Cholesterol      142 (A)    Triglycerides      65    HDL Cholesterol      71 (A)    LDL Cholesterol       58    Hemoglobin A1C  9.05 (A)     7.12 (A)   (A) Abnormal value            CMP    CMP 6/15/20 6/25/20 10/27/20   Glucose 200 (A) 262 (A) 178 (A)   BUN 33 (A) 35 (A) 30 (A)   Creatinine 1.08 (A) 1.16 (A) 1.24 (A)   eGFR Non  Am 51 47 43 (A)   Sodium 138 137 141   Potassium 4.7 5.1 (A) 4.6   Chloride 105 105 102   Calcium 9.8 9.5 10.3 (A)   Albumin 4.20 4.30 4.50   Total Bilirubin 0.6 0.6 0.6   Alkaline Phosphatase 47 46 47   AST (SGOT) 28 28 30   ALT (SGPT) 19 20 21   (A) Abnormal value            CBC    CBC 3/3/20 10/27/20   WBC 4.56 5.85   RBC 3.77 3.71 (A)   Hemoglobin 11.5 (A) 11.5 (A)   Hematocrit 35.2 35.5   MCV 93.4 95.7   MCH 30.5 31.0   MCHC 32.7 32.4   RDW  13.1 12.5   Platelets 168 200   (A) Abnormal value            Lipid Panel    Lipid Panel 3/3/20 10/27/20   Total Cholesterol 135 (A) 142 (A)   Triglycerides 79 65   HDL Cholesterol 68 (A) 71 (A)   VLDL Cholesterol 15.8 13   LDL Cholesterol  51 58   LDL/HDL Ratio 0.75 0.82   (A) Abnormal value            TSH    TSH 3/3/20   TSH 3.150           A1C Last 3 Results    HGBA1C Last 3 Results 3/3/20 6/15/20 10/27/20   Hemoglobin A1C 8.85 (A) 9.05 (A) 7.12 (A)   (A) Abnormal value            UA    Urinalysis 6/15/20 6/15/20 6/25/20 10/27/20 10/27/20    1037 1037  1007 1007   Squamous Epithelial Cells, UA  3-6 (A)   0-2   Specific Gravity, UA 1.020  1.020 1.015    Ketones, UA Negative  Negative Negative    Blood, UA Trace (A)  Negative Negative    Leukocytes, UA Small (1+) (A)  Negative Trace (A)    Nitrite, UA Negative  Negative Negative    RBC, UA  3-5 (A)   0-2 (A)   WBC, UA  6-12 (A)   0-2 (A)   Bacteria, UA  Trace (A)   Trace (A)   (A) Abnormal value                      Assessment and Plan    Diagnoses and all orders for this visit:    1. Type 2 diabetes mellitus with hyperglycemia, without long-term current use of insulin (CMS/[facility]) (Primary)  Comments:  FSBS 130-140  Orders:  -     Hemoglobin A1c; Future  -     Comprehensive metabolic panel; Future  -     Urinalysis With Culture If Indicated -; Future    2. Essential hypertension  -     Hemoglobin A1c; Future  -     Comprehensive metabolic panel; Future  -     Urinalysis With Culture If Indicated -; Future    3. Elevated serum creatinine  Comments:  following nephrology   Orders:  -     Hemoglobin A1c; Future  -     Comprehensive metabolic panel; Future  -     Urinalysis With Culture If Indicated -; Future    4. Elevated BUN  Comments:  following nephrology   Orders:  -     Hemoglobin A1c; Future  -     Comprehensive metabolic panel; Future  -     Urinalysis With Culture If Indicated -; Future    Other orders  -     Empagliflozin (Jardiance) 25 MG tablet; Take 25 mg  by mouth Daily.  Dispense: 30 tablet; Refill: 2      I spent 33 minutes caring for Odessa on this date of service. This time includes time spent by me in the following activities:preparing for the visit, reviewing tests, counseling and educating the patient/family/caregiver, ordering medications, tests, or procedures and documenting information in the medical record  Follow Up   Return in about 3 months (around 5/10/2021), or if symptoms worsen or fail to improve, for Recheck, or sooner as needed.  Patient was given instructions and counseling regarding her condition or for health maintenance advice. Please see specific information pulled into the AVS if appropriate.   I did have a fairly long discussion with patient regarding diabetes, we will keep her on Victoza at present and stop Metformin, she is currently not taking Jardiance so this is refilled for her and she is advised to restart this and at present continue on glipizide as directed previously.  Needs to follow a strict diabetic diet and will repeat labs including A1c CMP and urinalysis in 3 months.  Patient aware.  She will also need to follow-up here in 3 months for recheck.  Continue to see nephrology.  She is advised that it is very important that we also control her blood sugar along with trying to improve kidney function.  Patient appears to be aware and in agreement of this plan.  All questions and concerns are addressed with apparent understanding noted.  You have chosen to receive care through a telephone visit. Do you consent to use a telephone visit for your medical care today? Yes  This visit has been rescheduled as a phone visit to comply with patient safety concerns in accordance with CDC recommendations. Total time of discussion was 33 minutes.

## 2021-02-22 ENCOUNTER — OFFICE VISIT (OUTPATIENT)
Dept: FAMILY MEDICINE CLINIC | Facility: CLINIC | Age: 66
End: 2021-02-22

## 2021-02-22 DIAGNOSIS — E11.65 TYPE 2 DIABETES MELLITUS WITH HYPERGLYCEMIA, WITHOUT LONG-TERM CURRENT USE OF INSULIN (HCC): Primary | Chronic | ICD-10-CM

## 2021-02-22 PROCEDURE — G2025 DIS SITE TELE SVCS RHC/FQHC: HCPCS | Performed by: NURSE PRACTITIONER

## 2021-02-24 ENCOUNTER — DOCUMENTATION (OUTPATIENT)
Dept: NUTRITION | Facility: HOSPITAL | Age: 66
End: 2021-02-24

## 2021-02-24 NOTE — PROGRESS NOTES
Nutrition Services    Patient Name:  Odessa Walker  YOB: 1955  MRN: [id number]  Admit Date:  (Not on file)    Pt was referred by BHARTI Sultana, for diabetes ed. Spoke with pt by phone. Pt said she doesn't drive and doesn't have anyone to bring her to [place]. She did want to talk on the phone. Pt said she sometimes forgets to eat since she gets busy at home. Her family got concerned that she lost weight down to 120lb. Kendal Mccoy encouraged her to eat more and she gained up to 130lb. Pt asked about carb counting and RDN explained how to count carbs. She asked RDN to mail her info about this subject . She will make a reminder sign to not forget to eat 3 meals per day. RDN staff will continue to monitor by calling pt in 2 weeks to follow up over the phone.    Electronically signed by:  Kenisha Mcarthur RD  02/24/21 14:37 CST

## 2021-03-02 RX ORDER — LISINOPRIL 40 MG/1
TABLET ORAL
Qty: 15 TABLET | Refills: 5 | Status: SHIPPED | OUTPATIENT
Start: 2021-03-02 | End: 2021-08-10

## 2021-03-23 RX ORDER — LIRAGLUTIDE 6 MG/ML
INJECTION SUBCUTANEOUS
Qty: 6 ML | Refills: 4 | Status: SHIPPED | OUTPATIENT
Start: 2021-03-23 | End: 2021-05-04

## 2021-03-25 ENCOUNTER — DOCUMENTATION (OUTPATIENT)
Dept: NUTRITION | Facility: HOSPITAL | Age: 66
End: 2021-03-25

## 2021-03-25 NOTE — PROGRESS NOTES
Nutrition Services    Patient Name:  Odessa Walker  YOB: 1955  MRN: [id number]  Admit Date:  (Not on file)    Spoke with pt by phone as a follow up to our first conversation by phone. Pt said she is doing better. Made a sign for the refrigerator   to remind herself to eat. She received the nutrition info I mailed and said it was very  Helpful. She did ask me to mail her a sample menu. She sounds like she has more energy on our conversation today than the previous one.i encouraged her to call me with questions. Will follow as needed.    Electronically signed by:  Kenisha Mcarthur RD  03/25/21 13:45 CDT

## 2021-04-22 RX ORDER — GLIPIZIDE 10 MG/1
TABLET ORAL
Qty: 60 TABLET | Refills: 5 | Status: SHIPPED | OUTPATIENT
Start: 2021-04-22 | End: 2021-06-24 | Stop reason: SINTOL

## 2021-05-04 ENCOUNTER — OFFICE VISIT (OUTPATIENT)
Dept: FAMILY MEDICINE CLINIC | Facility: CLINIC | Age: 66
End: 2021-05-04

## 2021-05-04 VITALS — BODY MASS INDEX: 21.66 KG/M2 | HEIGHT: 65 IN | WEIGHT: 130 LBS

## 2021-05-04 DIAGNOSIS — E11.65 TYPE 2 DIABETES MELLITUS WITH HYPERGLYCEMIA, WITHOUT LONG-TERM CURRENT USE OF INSULIN (HCC): Primary | Chronic | ICD-10-CM

## 2021-05-04 DIAGNOSIS — R22.41: ICD-10-CM

## 2021-05-04 PROCEDURE — G2025 DIS SITE TELE SVCS RHC/FQHC: HCPCS | Performed by: NURSE PRACTITIONER

## 2021-05-04 RX ORDER — LIRAGLUTIDE 6 MG/ML
1.8 INJECTION SUBCUTANEOUS DAILY
Qty: 6 ML | Refills: 4 | Status: SHIPPED | OUTPATIENT
Start: 2021-05-04 | End: 2021-06-24

## 2021-05-04 NOTE — PROGRESS NOTES
"This was an audio and video enabled telemedicine encounter.    Chief Complaint  Hyperglycemia (this  ) and sore on foot (right foot, right under big toe)    Subjective          She states her blood sugar is not coming down and does not think it is due to her diet. It has been measuring in the 200s since 04/2021. She has been having readings of 212, 222, and 213 mg/dL since 04/29/2021. The patient states that today is the first time in a while it has dropped a bit at 182 mg/dL. She states she has not been eating sugar and has been watching her carbohydrate intake. She is taking Jardiance, glipizide, and Victoza. The Victoza is still at 1.2 mg as the pharmacy did not increase the dosage to the 1.8 mg I had changed it to last time.     The patient is also having problems with her right foot, on the plantar surface of her hallux. She states she had a \"callus-like thing\" there but it is starting to become a dark \"sore-like thing\". It has been there for the past 3 weeks with worsening lately. She states it is really painful, especially walking on it. The patient changes her socks daily and has tried Epsom salt without relief.     She also states her blood pressure has been good. Her blood pressure today was 99/59 mmHg with a pulse of 68 bpm. Her blood pressure yesterday was 99/57 with a pulse of 58 bpm.     Odessa Walker presents to [facility]  History of Present Illness    Objective   Vital Signs:   Ht 165.1 cm (65\")   Wt 59 kg (130 lb)   BMI 21.63 kg/m²     Physical Exam   Deferred, telehealth visit.    Result Review :     Common labs    Common Labsle 6/15/20 6/15/20 6/25/20 10/27/20 10/27/20 10/27/20 10/27/20    1037 1037  1007 1007 1007 1007   Glucose 200 (A)  262 (A)  178 (A)     BUN 33 (A)  35 (A)  30 (A)     Creatinine 1.08 (A)  1.16 (A)  1.24 (A)     eGFR Non  Am 51  47  43 (A)     Sodium 138  137  141     Potassium 4.7  5.1 (A)  4.6     Chloride 105  105  102   "   Calcium 9.8  9.5  10.3 (A)     Albumin 4.20  4.30  4.50     Total Bilirubin 0.6  0.6  0.6     Alkaline Phosphatase 47  46  47     AST (SGOT) 28  28  30     ALT (SGPT) 19  20  21     WBC    5.85      Hemoglobin    11.5 (A)      Hematocrit    35.5      Platelets    200      Total Cholesterol      142 (A)    Triglycerides      65    HDL Cholesterol      71 (A)    LDL Cholesterol       58    Hemoglobin A1C  9.05 (A)     7.12 (A)   (A) Abnormal value                      Assessment and Plan    Diagnoses and all orders for this visit:    1. Type 2 diabetes mellitus with hyperglycemia, without long-term current use of insulin (CMS/[facility]) (Primary)  Comments:  uncontrolled    2. Mass of skin of foot, right  Comments:  pt reports thinks is a dark callous,will come in thursday for eval and PE    Other orders  -     Liraglutide (Victoza) 18 MG/3ML solution pen-injector injection; Inject 1.8 mg under the skin into the appropriate area as directed Daily.  Dispense: 6 mL; Refill: 4      We will increase Victoza to 1.8 mg daily, and I encouraged a better diabetic diet. She will come in on Thursday for evaluation of her foot, as I do have concern about this and will need to evaluate it physically. I will consider a referral to podiatry as necessary. She is advised to monitor her blood sugar and blood pressure until that time as well. If she has any concerns or problems prior to the appointment, she will call back and let me know. We will continue on glipizide and Jardiance as was prescribed previously. The patient will continue off of Metformin. All questions and concerns are addressed with understanding verbalized. The patient is aware and in agreement to this plan.    I spent 35 minutes caring for Odessa on this date of service. This time includes time spent by me in the following activities:preparing for the visit, reviewing tests, counseling and educating the patient/family/caregiver, ordering medications, tests, or procedures  and documenting information in the medical record  Follow Up   Return in about 2 days (around 5/6/2021), or if symptoms worsen or fail to improve, for Recheck, or sooner as needed.  Patient was given instructions and counseling regarding her condition or for health maintenance advice. Please see specific information pulled into the AVS if appropriate.     This visit has been rescheduled as a phone visit to comply with patient safety concerns in accordance with CDC recommendations. Total time of discussion was 35 minutes.    You have chosen to receive care through a telephone visit. Do you consent to use a telephone visit for your medical care today? Yes    Scribed for SIDDHARTH Jim by Ramona Anton.  05/04/21   16:16 CDT    I have personally performed the services described in this document as scribed by the above individual, and it is both accurate and complete.  SIDDHARTH Jim  5/4/2021  16:21 CDT

## 2021-05-06 ENCOUNTER — OFFICE VISIT (OUTPATIENT)
Dept: FAMILY MEDICINE CLINIC | Facility: CLINIC | Age: 66
End: 2021-05-06

## 2021-05-06 VITALS
OXYGEN SATURATION: 99 % | BODY MASS INDEX: 19.33 KG/M2 | WEIGHT: 116 LBS | TEMPERATURE: 98.7 F | DIASTOLIC BLOOD PRESSURE: 74 MMHG | SYSTOLIC BLOOD PRESSURE: 122 MMHG | HEIGHT: 65 IN | HEART RATE: 72 BPM

## 2021-05-06 DIAGNOSIS — E11.9 TYPE 2 DIABETES MELLITUS WITHOUT COMPLICATION, WITHOUT LONG-TERM CURRENT USE OF INSULIN (HCC): ICD-10-CM

## 2021-05-06 DIAGNOSIS — S91.301A WOUND OF RIGHT FOOT: Primary | ICD-10-CM

## 2021-05-06 DIAGNOSIS — M79.672 PAIN IN BOTH FEET: ICD-10-CM

## 2021-05-06 DIAGNOSIS — M79.671 PAIN IN BOTH FEET: ICD-10-CM

## 2021-05-06 DIAGNOSIS — M79.674 TOE PAIN, RIGHT: ICD-10-CM

## 2021-05-06 PROCEDURE — 99214 OFFICE O/P EST MOD 30 MIN: CPT | Performed by: NURSE PRACTITIONER

## 2021-05-06 NOTE — PROGRESS NOTES
"Chief Complaint  Lower Extremity Issue (patient has a small sore place on the bottom of the right foot, with redness, noticed 3 weeks ago)    Subjective          The patient presents for a recheck of diabetes mellitus, and a sore on her R foot. She reports the sore has been present for approximately 3 weeks. The patient reports when ambulating, she intermittently feels nauseous, weak, and her head will feel funny; she is unsure of what her blood glucose level is when this happens \"I do not check it when in the store\". She states that she has to sit down when she has these symptoms. Reports fsbs running at home high 100s-200 at times. Has only been taking victoza 1.8mg for the last two days. Has been monitoring her BP also and BP has been running  systolic, 50-60s diastolic.     Additionally, she reports she has received her COVID-19 vaccination.     Odessa Walker presents to [facility]  History of Present Illness    Objective   Vital Signs:   /74   Pulse 72   Temp 98.7 °F (37.1 °C) (Oral)   Ht 165.1 cm (65\")   Wt 52.6 kg (116 lb)   SpO2 99%   BMI 19.30 kg/m²     Physical Exam  Vitals and nursing note reviewed.   Constitutional:       General: She is not in acute distress.     Appearance: Normal appearance. She is normal weight. She is not ill-appearing, toxic-appearing or diaphoretic.   HENT:      Head: Normocephalic and atraumatic.   Cardiovascular:      Rate and Rhythm: Normal rate and regular rhythm.      Pulses: Normal pulses.      Heart sounds: Normal heart sounds. No murmur heard.   No friction rub.   Pulmonary:      Effort: Pulmonary effort is normal. No respiratory distress.      Breath sounds: Normal breath sounds. No stridor. No wheezing, rhonchi or rales.   Musculoskeletal:      Right lower leg: No edema.      Left lower leg: No edema.      Comments: She does have a tender area that is circular, and slightly smaller than dime sized, to the ball of the " foot, in the weight bearing area of her foot, on the right, with varicose veins to her feet. Decreased sensation. Her great toes bilaterally are nearly perpendicular to her foot.     Skin:     General: Skin is warm and dry.   Neurological:      Mental Status: She is alert and oriented to person, place, and time.   Psychiatric:         Mood and Affect: Mood normal.         Behavior: Behavior normal.         Thought Content: Thought content normal.         Judgment: Judgment normal.        Result Review :        SCANNED - LABS (02/10/2021 00:00)  Hemoglobin A1c (02/10/2021 11:16)  Comprehensive metabolic panel (02/10/2021 13:54)              Assessment and Plan    Diagnoses and all orders for this visit:    1. Wound of right foot (Primary)  -     Ambulatory Referral to Podiatry    2. Type 2 diabetes mellitus without complication, without long-term current use of insulin (CMS/[facility])  -     Ambulatory Referral to Podiatry    3. Toe pain, right  -     Ambulatory Referral to Podiatry    4. Pain in both feet  -     Ambulatory Referral to Podiatry      She will be referred to podiatry for further evaluation and treatment as they deem necessary. She will continue with 1.8 mg of Victoza daily. She will follow-up in 1 month for recheck, or sooner if needed; this can be by telephone, video, or in office, whichever she prefers. All questions and concerns are addressed with understanding noted. She is aware and is in agreement to this plan.     She is given the number to PACS today, so that she is able to get to the podiatry appointment in [place].     I spent 25 minutes caring for Odessa on this date of service. This time includes time spent by me in the following activities:preparing for the visit, reviewing tests, performing a medically appropriate examination and/or evaluation , counseling and educating the patient/family/caregiver, ordering medications, tests, or procedures, referring and communicating with other health  care professionals  and documenting information in the medical record  Follow Up   Return in about 4 weeks (around 6/3/2021), or if symptoms worsen or fail to improve, for Recheck, or sooner as needed.  Patient was given instructions and counseling regarding her condition or for health maintenance advice. Please see specific information pulled into the AVS if appropriate.     Scribed for SIDDHARTH Jim by Danielle Finnegan.  05/06/21   14:49 CDT    I have personally performed the services described in this document as scribed by the above individual, and it is both accurate and complete.  SIDDHARTH Jim  5/6/2021  15:10 CDT

## 2021-05-11 ENCOUNTER — OFFICE VISIT (OUTPATIENT)
Dept: CARDIOLOGY | Facility: CLINIC | Age: 66
End: 2021-05-11

## 2021-05-11 VITALS
TEMPERATURE: 98 F | BODY MASS INDEX: 19.47 KG/M2 | OXYGEN SATURATION: 99 % | DIASTOLIC BLOOD PRESSURE: 78 MMHG | HEIGHT: 65 IN | HEART RATE: 79 BPM | WEIGHT: 116.9 LBS | SYSTOLIC BLOOD PRESSURE: 108 MMHG

## 2021-05-11 DIAGNOSIS — E08.59 DIABETES MELLITUS DUE TO UNDERLYING CONDITION WITH OTHER CIRCULATORY COMPLICATIONS (HCC): ICD-10-CM

## 2021-05-11 DIAGNOSIS — Z87.828 H/O OPEN LEG WOUND: Primary | ICD-10-CM

## 2021-05-11 PROCEDURE — 99213 OFFICE O/P EST LOW 20 MIN: CPT | Performed by: INTERNAL MEDICINE

## 2021-05-19 RX ORDER — EMPAGLIFLOZIN 25 MG/1
TABLET, FILM COATED ORAL
Qty: 30 TABLET | Refills: 5 | Status: SHIPPED | OUTPATIENT
Start: 2021-05-19 | End: 2021-06-24 | Stop reason: SINTOL

## 2021-05-26 ENCOUNTER — OFFICE VISIT (OUTPATIENT)
Dept: PODIATRY | Facility: CLINIC | Age: 66
End: 2021-05-26

## 2021-05-26 VITALS — HEART RATE: 94 BPM | WEIGHT: 116 LBS | HEIGHT: 65 IN | BODY MASS INDEX: 19.33 KG/M2 | OXYGEN SATURATION: 98 %

## 2021-05-26 DIAGNOSIS — M20.11 VALGUS DEFORMITY OF BOTH GREAT TOES: ICD-10-CM

## 2021-05-26 DIAGNOSIS — E11.9 TYPE 2 DIABETES MELLITUS WITHOUT COMPLICATION, WITHOUT LONG-TERM CURRENT USE OF INSULIN (HCC): Primary | ICD-10-CM

## 2021-05-26 DIAGNOSIS — M20.42 HAMMER TOES OF BOTH FEET: ICD-10-CM

## 2021-05-26 DIAGNOSIS — L84 PRE-ULCERATIVE CALLUSES: ICD-10-CM

## 2021-05-26 DIAGNOSIS — M20.12 VALGUS DEFORMITY OF BOTH GREAT TOES: ICD-10-CM

## 2021-05-26 DIAGNOSIS — M21.70 ACQUIRED UNEQUAL LIMB LENGTH: ICD-10-CM

## 2021-05-26 DIAGNOSIS — M20.41 HAMMER TOES OF BOTH FEET: ICD-10-CM

## 2021-05-26 PROCEDURE — 99203 OFFICE O/P NEW LOW 30 MIN: CPT | Performed by: PODIATRIST

## 2021-05-26 PROCEDURE — 11055 PARING/CUTG B9 HYPRKER LES 1: CPT | Performed by: PODIATRIST

## 2021-05-26 RX ORDER — MAGNESIUM OXIDE 400 MG/1
1 TABLET ORAL 2 TIMES DAILY
COMMUNITY
Start: 2021-05-12 | End: 2021-11-22

## 2021-05-26 NOTE — PROGRESS NOTES
Odessa Walker  1955  66 y.o. female   PCP: LEXY MESSINA 05/06/2021  BS-[id number] this morning per patient.     Patient presents to clinic today with the complaint of a callous on right foot and bilateral toe pain.     05/28/21    Chief Complaint   Patient presents with   • Right Foot - Follow-up, Pain   • Left Foot - Follow-up, Pain       History of Present Illness    Pleasant 66-year-old female presents to clinic today with chief complaint of toe deformities and large callus on the bottom of her right foot.  Patient is diabetic.  Her blood sugars are well controlled.  Today she is ambulating with multiple inserts attached to the bottom of her right tennis shoe.  She states that she has a significant limb length discrepancy caused by previous right knee surgeries.  She rates her pain today as a 5 out of 10.  She has no other complaints.    Past Medical History:   Diagnosis Date   • Arthritis     BACK, KNEES AND SHOULDER   • Arthritis    • Back pain    • Benign essential hypertension    • Candidiasis of skin and nail     L foot   • Chest pain    • Chronic anemia    • Claudication (CMS/HCC)    • Corns and callosities    • Depressive disorder    • Dermatitis    • Disease of nail     other specified   • Disorder of peripheral nervous system    • Displaced fracture of third metatarsal bone, right foot, initial encounter for closed fracture    • Displaced fracture of third metatarsal bone, right foot, subsequent encounter for fracture with routine healing    • Diverticular disease of colon    • Dog bite of hand    • Epigastric pain    • Essential hypertension    • Foot pain    • GERD (gastroesophageal reflux disease)    • H/O screening mammography    • Hallux valgus    • Hammer toe    • Heartburn    • Hyperlipidemia    • Hypertension    • Joint pain    • Joint swelling    • Neurologic disorder associated with diabetes mellitus (CMS/HCC)     type 2   • Nondisplaced fracture of fourth metatarsal bone, right foot,  initial encounter for closed fracture    • Nondisplaced fracture of fourth metatarsal bone, right foot, subsequent encounter for fracture with routine healing    • Osteoporosis    • Pain in right leg    • Superficial laceration of hand    • Type 2 diabetes mellitus (CMS/HCC)    • Urinary tract infectious disease    • Venous insufficiency (chronic) (peripheral)          Past Surgical History:   Procedure Laterality Date   • BACK SURGERY  2007   • COLONOSCOPY  11/11/2015    Diverticulosis found in the sigmoid colon. Hemorrhoids found in the anus.   • HARDWARE REMOVAL Right 7/19/2019    Procedure: excision of loose body in right knee.;  Surgeon: Pradeep Jacobs MD;  Location: [facility] OR;  Service: Orthopedics   • KNEE SURGERY Right 2005   • OOPHORECTOMY     • OTHER SURGICAL HISTORY  05/06/2015    DEBRIDE NAIL 6 OR MORE    • OTHER SURGICAL HISTORY  05/06/2015    PARING CORN/CALLUS    • OTHER SURGICAL HISTORY  10/25/2015    TREAT METATARSAL FRACTURE    • UPPER GASTROINTESTINAL ENDOSCOPY  11/11/2015    Esophagitis seen.Biopsy taken.A hiatus hernia was found in the esophagus.Gastritis found in the stomach.Biopsy taken   • US VENOUS EXTREMITY UNILATERAL  01/26/2016   • VAGINAL HYSTERECTOMY SALPINGO OOPHORECTOMY  1999         Family History   Problem Relation Age of Onset   • Heart disease Mother    • Diabetes Mother    • Hypertension Mother    • Osteoarthritis Father    • Diabetes Other    • Hypertension Other    • Kidney disease Other    • Ovarian cancer Sister    • Heart disease Sister    • Diabetes Sister    • Hypertension Sister    • Diabetes Brother    • Hypertension Brother          Social History     Socioeconomic History   • Marital status:      Spouse name: Not on file   • Number of children: Not on file   • Years of education: Not on file   • Highest education level: Not on file   Tobacco Use   • Smoking status: Former Smoker     Packs/day: 0.50     Years: 8.00     Pack years: 4.00     Types:  Cigarettes   • Smokeless tobacco: Never Used   • Tobacco comment: quit smoking >20 yr ago   Vaping Use   • Vaping Use: Never used   Substance and Sexual Activity   • Alcohol use: No   • Drug use: No   • Sexual activity: Defer         Current Outpatient Medications   Medication Sig Dispense Refill   • aspirin 81 MG chewable tablet Chew 81 mg Daily.     • Calcium Carbonate (CALCIUM 600 PO) Take 1 tablet by mouth Daily.     • Flax Oil-Fish Oil-Borage Oil (FISH OIL-FLAX OIL-BORAGE OIL PO) Take 1 tablet by mouth 2 (Two) Times a Day.     • fluticasone (FLONASE) 50 MCG/ACT nasal spray 2 sprays by Each Nare route Daily. Ms. Felton is covering for Ms. Mccoy. 16 g 1   • glipizide (GLUCOTROL) 10 MG tablet TAKE 1 TABLET BY MOUTH TWO TIMES A DAY BEFORE MEALS 60 tablet 5   • Glucosamine HCl (GLUCOSAMINE PO) Take 1,000 mg by mouth.     • glucose blood (Accu-Chek Mary Kate Plus) test strip 1 each by Other route Daily. use to test blood sugar once daily 50 each 5   • glucose monitor monitoring kit 1 each Daily. DX:E11.9 1 each 0   • glucose monitor monitoring kit 1 each Daily. DX E11.9 1 each 0   • hydrOXYzine (ATARAX) 25 MG tablet Take 1-2 po qhs prn sleep 60 tablet 0   • Insulin Pen Needle (PEN NEEDLES) 31G X 8 MM misc Use as instructed with victoza 100 each 4   • IRON PO Take 65 mg by mouth Daily.     • Jardiance 25 MG tablet TAKE 1 TABLET EVERY DAY 30 tablet 5   • Lancets misc Once daily 100 each 12   • Liraglutide (Victoza) 18 MG/3ML solution pen-injector injection Inject 1.8 mg under the skin into the appropriate area as directed Daily. 6 mL 4   • lisinopril (PRINIVIL,ZESTRIL) 40 MG tablet TAKE 1/2 TABLET BY MOUTH DAILY 15 tablet 5   • magnesium oxide (MAG-OX) 400 MG tablet Take 1 tablet by mouth 2 (Two) Times a Day.     • Multiple Vitamins-Minerals (ONE-A-DAY 50 PLUS PO) Take 1 tablet by mouth Daily.     • ONE TOUCH LANCETS misc 1 applicator Daily. 200 each 12   • ranolazine (Ranexa) 500 MG 12 hr tablet Take 1 tablet by mouth 2  "(Two) Times a Day. 60 tablet 11   • rosuvastatin (CRESTOR) 10 MG tablet Take 1 tablet by mouth Daily. 30 tablet 11   • sertraline (ZOLOFT) 50 MG tablet TAKE 1 TABLET BY MOUTH DAILY 60 tablet 5   • sulindac (CLINORIL) 200 MG tablet Take 200 mg by mouth 2 (Two) Times a Day.       No current facility-administered medications for this visit.     Review of Systems   Constitutional: Negative.    HENT: Negative.    Eyes: Negative.    Respiratory: Negative.    Cardiovascular: Negative.    Gastrointestinal: Negative.    Musculoskeletal:        Toe pain and deformities   Skin:        Callus right foot   Psychiatric/Behavioral: Negative.          OBJECTIVE    Pulse 94   Ht 165.1 cm (65\")   Wt 52.6 kg (116 lb)   SpO2 98%   BMI 19.30 kg/m²      Physical Exam  Vitals reviewed.   Constitutional:       General: She is not in acute distress.     Appearance: She is well-developed.   HENT:      Head: Normocephalic and atraumatic.      Nose: Nose normal.   Eyes:      Conjunctiva/sclera: Conjunctivae normal.      Pupils: Pupils are equal, round, and reactive to light.   Neck:      Trachea: No tracheal deviation.   Pulmonary:      Effort: Pulmonary effort is normal. No respiratory distress.      Breath sounds: No wheezing.   Musculoskeletal:         General: Tenderness and deformity present. Normal range of motion.      Cervical back: Normal range of motion.   Skin:     General: Skin is warm and dry.      Capillary Refill: Capillary refill takes less than 2 seconds.   Neurological:      Mental Status: She is alert and oriented to person, place, and time.      Deep Tendon Reflexes: Reflexes normal.   Psychiatric:         Behavior: Behavior normal.         Thought Content: Thought content normal.       Lower Extremity:     Cardiovascular:    DP/PT pulses faintly palpable    CFT brisk  to all digits  Skin temp is warm to warm from proximal tibia to distal digits  Pedal hair growth absent.   Musculoskeletal:  Muscle strength is 5/5 for " all muscle groups tested   Moderate to severe hallux valgus noted right, mild to moderate hallux valgus left  Rigidly contracted second digit on the right, reducibly contracted digits 3,4 and 5 right  Reducibly contracted digits 2 through 5 left  Diminished fat pad bilateral  Pain with first MPJ range of motion bilateral  Dermatological:   Nails 1-5 are thickened and discolored  Skin is warm, dry and intact    Webspaces 1-4 bilateral are clean, dry and intact.   No subcutaneous nodules or masses noted    No open wounds noted   Hyperkeratotic lesion noted to plantar right second metatarsal head.  Pain on direct palpation.  Neurological:   Protective sensation diminished  Sensation intact to light touch        Procedures        ASSESSMENT AND PLAN    Diagnoses and all orders for this visit:    1. Type 2 diabetes mellitus without complication, without long-term current use of insulin (CMS/[facility]) (Primary)    2. Valgus deformity of both great toes    3. Hammer toes of both feet    4. Pre-ulcerative calluses    5. Acquired unequal limb length      - A diabetic foot screening exam was performed and the patient was educated on the foot complications related to diabetes,  preventative foot care and what signs and symptoms to watch for.  Instructed to contact our office if any foot problems develop before next visit.  - Trimmed hyperkeratotic lesions noted in objective with a #15 blade without incident.   -Rx for custom diabetic shoes and inserts with shoe buildup on the right and right second metatarsal head offloading.  Patient requires custom shoes and inserts due to her significant digital deformities and risk of ulceration.  - All the patients questions were answered.  - RTC 3 months or sooner if needed.         This document has been electronically signed by Ge Elizondo DPM on May 28, 2021 11:55 CDT     5/28/2021  11:55 CDT

## 2021-05-27 ENCOUNTER — TELEPHONE (OUTPATIENT)
Dept: PODIATRY | Facility: CLINIC | Age: 66
End: 2021-05-27

## 2021-05-27 NOTE — TELEPHONE ENCOUNTER
PT REQUESTS A CALL BACK RE WANTS TO KNOW IF SHE CAN PAY FOR DIABETIC SHOES AND THE DR EXAM OUT OF POCKET SINCE MEDICARE WONT PAY FOR THESE FOR HER.  CALL BACK # [phone].  THANK YOU.

## 2021-05-27 NOTE — TELEPHONE ENCOUNTER
Ms Walker would like to move forward with [place]. She is aware that insurance will not pay but she has the money saved up to take care of hte shoes/inserts.    Thank you  Rahel

## 2021-06-07 ENCOUNTER — OFFICE VISIT (OUTPATIENT)
Dept: FAMILY MEDICINE CLINIC | Facility: CLINIC | Age: 66
End: 2021-06-07

## 2021-06-07 VITALS
HEIGHT: 65 IN | HEART RATE: 85 BPM | DIASTOLIC BLOOD PRESSURE: 74 MMHG | OXYGEN SATURATION: 99 % | BODY MASS INDEX: 19.33 KG/M2 | SYSTOLIC BLOOD PRESSURE: 106 MMHG | WEIGHT: 116 LBS

## 2021-06-07 DIAGNOSIS — E11.9 TYPE 2 DIABETES MELLITUS WITHOUT COMPLICATION, WITHOUT LONG-TERM CURRENT USE OF INSULIN (HCC): Chronic | ICD-10-CM

## 2021-06-07 DIAGNOSIS — M25.512 CHRONIC LEFT SHOULDER PAIN: ICD-10-CM

## 2021-06-07 DIAGNOSIS — G89.29 CHRONIC LEFT SHOULDER PAIN: ICD-10-CM

## 2021-06-07 DIAGNOSIS — I10 ESSENTIAL HYPERTENSION: Primary | Chronic | ICD-10-CM

## 2021-06-07 DIAGNOSIS — Z82.49 FAMILY HISTORY OF EARLY CAD: ICD-10-CM

## 2021-06-07 DIAGNOSIS — M54.2 NECK PAIN: ICD-10-CM

## 2021-06-07 DIAGNOSIS — E11.65 TYPE 2 DIABETES MELLITUS WITH HYPERGLYCEMIA, WITHOUT LONG-TERM CURRENT USE OF INSULIN (HCC): Chronic | ICD-10-CM

## 2021-06-07 DIAGNOSIS — E78.2 MIXED HYPERLIPIDEMIA: Chronic | ICD-10-CM

## 2021-06-07 PROCEDURE — 99214 OFFICE O/P EST MOD 30 MIN: CPT | Performed by: NURSE PRACTITIONER

## 2021-06-07 NOTE — PROGRESS NOTES
"Chief Complaint  Follow-up (1 month FU)    Subjective          Odessa Walker presents to [facility]  History of Present Illness     The patient presents in the office today for a recheck of diabetes, hypertension, and hyperlipidemia.     She endorses pain in her left shoulder and neck today, which she attributes to arthritis. This pain is most evident when she is trying to get up from a lying position. She has been taking aspirin with good effect. She denies any numbness, tingling, or weakness. The patient does report hearing \"popping and cracking\" from her neck.     Her blood glucose has been in the 160 to 180 range, according to the patient. She has been trying to follow a low-carb diet. She endorses taking her medications, glipizide, Jardiance, and Victoza 1.8 mg. Her blood sugar today was 170.     Her blood pressure today appears well controlled.     According to the patient, she does not need to have surgical intervention regarding her foot pain and leg length disparity. She is going to have orthotics made to help with her foot pain.     Additionally, she reports that her nephrologist, Dr. Islas, started her on a new medication that she has been taking two times per day for the past month. She does not remember the name of the medication at this time but she will call us back with this information.     She denies any lower extremity edema at this time. She reports that her cardiologist, Dr. Kapadia, ordered a doppler ultrasound of her arms and legs to check her circulation, as she is often cold in the wintertime. This is scheduled for 06/11/2021.    Objective   Vital Signs:   /74   Pulse 85   Ht 165.1 cm (65\")   Wt 52.6 kg (116 lb)   SpO2 99%   BMI 19.30 kg/m²     Physical Exam  Vitals and nursing note reviewed.   Constitutional:       General: She is not in acute distress.     Appearance: Normal appearance. She is normal weight. She is not ill-appearing, " toxic-appearing or diaphoretic.   HENT:      Head: Normocephalic and atraumatic.   Neck:      Vascular: No carotid bruit.   Cardiovascular:      Rate and Rhythm: Normal rate and regular rhythm.      Heart sounds: Normal heart sounds. No murmur heard.   No friction rub. No gallop.    Pulmonary:      Effort: Pulmonary effort is normal. No respiratory distress.      Breath sounds: Normal breath sounds. No stridor. No wheezing, rhonchi or rales.   Musculoskeletal:      Right lower leg: No edema.      Left lower leg: No edema.   Skin:     General: Skin is warm and dry.   Neurological:      Mental Status: She is alert and oriented to person, place, and time.   Psychiatric:         Mood and Affect: Mood normal.         Behavior: Behavior normal.         Thought Content: Thought content normal.         Judgment: Judgment normal.        Result Review :     Common labs    Common Labsle 6/15/20 6/15/20 6/25/20 10/27/20 10/27/20 10/27/20 10/27/20    1037 1037  1007 1007 1007 1007   Glucose 200 (A)  262 (A)  178 (A)     BUN 33 (A)  35 (A)  30 (A)     Creatinine 1.08 (A)  1.16 (A)  1.24 (A)     eGFR Non  Am 51  47  43 (A)     Sodium 138  137  141     Potassium 4.7  5.1 (A)  4.6     Chloride 105  105  102     Calcium 9.8  9.5  10.3 (A)     Albumin 4.20  4.30  4.50     Total Bilirubin 0.6  0.6  0.6     Alkaline Phosphatase 47  46  47     AST (SGOT) 28  28  30     ALT (SGPT) 19  20  21     WBC    5.85      Hemoglobin    11.5 (A)      Hematocrit    35.5      Platelets    200      Total Cholesterol      142 (A)    Triglycerides      65    HDL Cholesterol      71 (A)    LDL Cholesterol       58    Hemoglobin A1C  9.05 (A)     7.12 (A)   (A) Abnormal value            CMP    CMP 6/15/20 6/25/20 10/27/20   Glucose 200 (A) 262 (A) 178 (A)   BUN 33 (A) 35 (A) 30 (A)   Creatinine 1.08 (A) 1.16 (A) 1.24 (A)   eGFR Non  Am 51 47 43 (A)   Sodium 138 137 141   Potassium 4.7 5.1 (A) 4.6   Chloride 105 105 102   Calcium 9.8 9.5 10.3  (A)   Albumin 4.20 4.30 4.50   Total Bilirubin 0.6 0.6 0.6   Alkaline Phosphatase 47 46 47   AST (SGOT) 28 28 30   ALT (SGPT) 19 20 21   (A) Abnormal value            CBC    CBC 10/27/20   WBC 5.85   RBC 3.71 (A)   Hemoglobin 11.5 (A)   Hematocrit 35.5   MCV 95.7   MCH 31.0   MCHC 32.4   RDW 12.5   Platelets 200   (A) Abnormal value            CBC w/diff    CBC w/Diff 10/27/20   WBC 5.85   RBC 3.71 (A)   Hemoglobin 11.5 (A)   Hematocrit 35.5   MCV 95.7   MCH 31.0   MCHC 32.4   RDW 12.5   Platelets 200   Neutrophil Rel % 61.4   Lymphocyte Rel % 29.2   Monocyte Rel % 8.0   Eosinophil Rel % 0.7   Basophil Rel % 0.7   (A) Abnormal value            Lipid Panel    Lipid Panel 10/27/20   Total Cholesterol 142 (A)   Triglycerides 65   HDL Cholesterol 71 (A)   VLDL Cholesterol 13   LDL Cholesterol  58   LDL/HDL Ratio 0.82   (A) Abnormal value                          Assessment and Plan    Diagnoses and all orders for this visit:    1. Essential hypertension (Primary)    2. Mixed hyperlipidemia  -     Lipid panel; Future    3. Type 2 diabetes mellitus without complication, without long-term current use of insulin (CMS/[facility])  -     Microalbumin / Creatinine Urine Ratio - Urine, Clean Catch; Future  -     Comprehensive metabolic panel; Future  -     Hemoglobin A1c; Future    4. Type 2 diabetes mellitus with hyperglycemia, without long-term current use of insulin (CMS/[facility])  -     Microalbumin / Creatinine Urine Ratio - Urine, Clean Catch; Future  -     Comprehensive metabolic panel; Future  -     Hemoglobin A1c; Future    5. Family history of early CAD    6. Neck pain  -     XR Spine Cervical 2 or 3 View  -     XR Shoulder 1 View Left; Future    7. Chronic left shoulder pain  -     XR Spine Cervical 2 or 3 View  -     XR Shoulder 1 View Left; Future      Will obtain labs when she is fasting, will inform her of results. Consider changing or adding diabetic medication depending upon glucose and A1c results. We will also x-ray her  left shoulder and neck and inform her of these results via phone as well. She will follow up in 3 to 4 months for recheck or sooner if needed. All questions and concerns are addressed with understanding noted. The patient is in agreement to above plan.      I spent 33 minutes caring for Odessa on this date of service. This time includes time spent by me in the following activities:preparing for the visit, reviewing tests, performing a medically appropriate examination and/or evaluation , counseling and educating the patient/family/caregiver, ordering medications, tests, or procedures and documenting information in the medical record  Follow Up   Return in about 3 months (around 9/7/2021), or if symptoms worsen or fail to improve, for Recheck, or sooner as needed.  Patient was given instructions and counseling regarding her condition or for health maintenance advice. Please see specific information pulled into the AVS if appropriate.     Scribed for SIDDHARTH Jim by Barbi Maldonado.  06/07/21   16:24 CDT    I have personally performed the services described in this document as scribed by the above individual, and it is both accurate and complete.  SIDDHARTH Jim  6/7/2021  16:45 CDT

## 2021-06-11 ENCOUNTER — LAB (OUTPATIENT)
Dept: LAB | Facility: OTHER | Age: 66
End: 2021-06-11

## 2021-06-11 DIAGNOSIS — E11.65 TYPE 2 DIABETES MELLITUS WITH HYPERGLYCEMIA, WITHOUT LONG-TERM CURRENT USE OF INSULIN (HCC): Chronic | ICD-10-CM

## 2021-06-11 DIAGNOSIS — E11.9 TYPE 2 DIABETES MELLITUS WITHOUT COMPLICATION, WITHOUT LONG-TERM CURRENT USE OF INSULIN (HCC): Chronic | ICD-10-CM

## 2021-06-11 DIAGNOSIS — E78.2 MIXED HYPERLIPIDEMIA: Chronic | ICD-10-CM

## 2021-06-11 DIAGNOSIS — R79.9 ELEVATED BUN: ICD-10-CM

## 2021-06-11 DIAGNOSIS — I10 ESSENTIAL HYPERTENSION: Chronic | ICD-10-CM

## 2021-06-11 DIAGNOSIS — R79.89 ELEVATED SERUM CREATININE: ICD-10-CM

## 2021-06-11 LAB
ALBUMIN SERPL-MCNC: 4.1 G/DL (ref 3.5–5)
ALBUMIN UR-MCNC: <1.2 MG/DL
ALBUMIN/GLOB SERPL: 1.4 G/DL (ref 1.1–1.8)
ALP SERPL-CCNC: 54 U/L (ref 38–126)
ALT SERPL W P-5'-P-CCNC: 20 U/L
ANION GAP SERPL CALCULATED.3IONS-SCNC: 6 MMOL/L (ref 5–15)
AST SERPL-CCNC: 28 U/L (ref 14–36)
BILIRUB SERPL-MCNC: 0.5 MG/DL (ref 0.2–1.3)
BILIRUB UR QL STRIP: NEGATIVE
BUN SERPL-MCNC: 42 MG/DL (ref 7–23)
BUN/CREAT SERPL: 27.6 (ref 7–25)
CALCIUM SPEC-SCNC: 9.8 MG/DL (ref 8.4–10.2)
CHLORIDE SERPL-SCNC: 102 MMOL/L (ref 101–112)
CHOLEST SERPL-MCNC: 167 MG/DL (ref 150–200)
CLARITY UR: ABNORMAL
CO2 SERPL-SCNC: 27 MMOL/L (ref 22–30)
COLOR UR: YELLOW
CREAT SERPL-MCNC: 1.52 MG/DL (ref 0.52–1.04)
CREAT UR-MCNC: 56.5 MG/DL
GFR SERPL CREATININE-BSD FRML MDRD: 34 ML/MIN/1.73 (ref 45–104)
GLOBULIN UR ELPH-MCNC: 2.9 GM/DL (ref 2.3–3.5)
GLUCOSE SERPL-MCNC: 220 MG/DL (ref 70–99)
GLUCOSE UR STRIP-MCNC: ABNORMAL MG/DL
HBA1C MFR BLD: 10.06 % (ref 4.8–5.6)
HDLC SERPL-MCNC: 63 MG/DL (ref 40–59)
HGB UR QL STRIP.AUTO: NEGATIVE
KETONES UR QL STRIP: NEGATIVE
LDLC SERPL CALC-MCNC: 88 MG/DL
LDLC/HDLC SERPL: 1.37 {RATIO} (ref 0–3.22)
LEUKOCYTE ESTERASE UR QL STRIP.AUTO: NEGATIVE
MICROALBUMIN/CREAT UR: NORMAL MG/G{CREAT}
NITRITE UR QL STRIP: NEGATIVE
PH UR STRIP.AUTO: <=5 [PH] (ref 5.5–8)
POTASSIUM SERPL-SCNC: 4.8 MMOL/L (ref 3.4–5)
PROT SERPL-MCNC: 7 G/DL (ref 6.3–8.6)
PROT UR QL STRIP: NEGATIVE
SODIUM SERPL-SCNC: 135 MMOL/L (ref 137–145)
SP GR UR STRIP: 1.01 (ref 1–1.03)
TRIGL SERPL-MCNC: 89 MG/DL
UROBILINOGEN UR QL STRIP: ABNORMAL
VLDLC SERPL-MCNC: 16 MG/DL (ref 5–40)

## 2021-06-11 PROCEDURE — 80061 LIPID PANEL: CPT | Performed by: NURSE PRACTITIONER

## 2021-06-11 PROCEDURE — 83036 HEMOGLOBIN GLYCOSYLATED A1C: CPT | Performed by: NURSE PRACTITIONER

## 2021-06-11 PROCEDURE — 82043 UR ALBUMIN QUANTITATIVE: CPT | Performed by: NURSE PRACTITIONER

## 2021-06-11 PROCEDURE — 81003 URINALYSIS AUTO W/O SCOPE: CPT | Performed by: NURSE PRACTITIONER

## 2021-06-11 PROCEDURE — 36415 COLL VENOUS BLD VENIPUNCTURE: CPT | Performed by: NURSE PRACTITIONER

## 2021-06-11 PROCEDURE — 82570 ASSAY OF URINE CREATININE: CPT | Performed by: NURSE PRACTITIONER

## 2021-06-11 PROCEDURE — 80053 COMPREHEN METABOLIC PANEL: CPT | Performed by: NURSE PRACTITIONER

## 2021-06-14 DIAGNOSIS — M54.2 NECK PAIN: Primary | ICD-10-CM

## 2021-06-17 DIAGNOSIS — R79.89 ELEVATED SERUM CREATININE: ICD-10-CM

## 2021-06-17 DIAGNOSIS — R79.9 ELEVATED BUN: ICD-10-CM

## 2021-06-17 DIAGNOSIS — E11.65 TYPE 2 DIABETES MELLITUS WITH HYPERGLYCEMIA, WITHOUT LONG-TERM CURRENT USE OF INSULIN (HCC): Primary | ICD-10-CM

## 2021-06-22 ENCOUNTER — LAB (OUTPATIENT)
Dept: LAB | Facility: OTHER | Age: 66
End: 2021-06-22

## 2021-06-22 DIAGNOSIS — R79.9 ELEVATED BUN: ICD-10-CM

## 2021-06-22 DIAGNOSIS — E11.9 TYPE 2 DIABETES MELLITUS WITHOUT COMPLICATION, WITHOUT LONG-TERM CURRENT USE OF INSULIN (HCC): Chronic | ICD-10-CM

## 2021-06-22 DIAGNOSIS — R79.89 ELEVATED SERUM CREATININE: ICD-10-CM

## 2021-06-22 DIAGNOSIS — E11.65 TYPE 2 DIABETES MELLITUS WITH HYPERGLYCEMIA, WITHOUT LONG-TERM CURRENT USE OF INSULIN (HCC): Chronic | ICD-10-CM

## 2021-06-22 LAB
ALBUMIN SERPL-MCNC: 4 G/DL (ref 3.5–5)
ALBUMIN/GLOB SERPL: 1.4 G/DL (ref 1.1–1.8)
ALP SERPL-CCNC: 53 U/L (ref 38–126)
ALT SERPL W P-5'-P-CCNC: 21 U/L
ANION GAP SERPL CALCULATED.3IONS-SCNC: 3 MMOL/L (ref 5–15)
AST SERPL-CCNC: 27 U/L (ref 14–36)
BILIRUB SERPL-MCNC: 0.6 MG/DL (ref 0.2–1.3)
BUN SERPL-MCNC: 38 MG/DL (ref 7–23)
BUN/CREAT SERPL: 25 (ref 7–25)
CALCIUM SPEC-SCNC: 9.7 MG/DL (ref 8.4–10.2)
CHLORIDE SERPL-SCNC: 101 MMOL/L (ref 101–112)
CO2 SERPL-SCNC: 31 MMOL/L (ref 22–30)
CREAT SERPL-MCNC: 1.52 MG/DL (ref 0.52–1.04)
GFR SERPL CREATININE-BSD FRML MDRD: 34 ML/MIN/1.73 (ref 45–104)
GLOBULIN UR ELPH-MCNC: 2.8 GM/DL (ref 2.3–3.5)
GLUCOSE SERPL-MCNC: 293 MG/DL (ref 70–99)
POTASSIUM SERPL-SCNC: 4.8 MMOL/L (ref 3.4–5)
PROT SERPL-MCNC: 6.8 G/DL (ref 6.3–8.6)
SODIUM SERPL-SCNC: 135 MMOL/L (ref 137–145)

## 2021-06-22 PROCEDURE — 36415 COLL VENOUS BLD VENIPUNCTURE: CPT | Performed by: NURSE PRACTITIONER

## 2021-06-22 PROCEDURE — 83036 HEMOGLOBIN GLYCOSYLATED A1C: CPT | Performed by: NURSE PRACTITIONER

## 2021-06-22 PROCEDURE — 80053 COMPREHEN METABOLIC PANEL: CPT | Performed by: NURSE PRACTITIONER

## 2021-06-23 LAB — HBA1C MFR BLD: 10.1 % (ref 4.8–5.6)

## 2021-06-24 ENCOUNTER — OFFICE VISIT (OUTPATIENT)
Dept: FAMILY MEDICINE CLINIC | Facility: CLINIC | Age: 66
End: 2021-06-24

## 2021-06-24 VITALS — HEIGHT: 65 IN | WEIGHT: 116 LBS | BODY MASS INDEX: 19.33 KG/M2

## 2021-06-24 DIAGNOSIS — I10 ESSENTIAL HYPERTENSION: Chronic | ICD-10-CM

## 2021-06-24 DIAGNOSIS — E78.2 MIXED HYPERLIPIDEMIA: Chronic | ICD-10-CM

## 2021-06-24 DIAGNOSIS — E11.65 TYPE 2 DIABETES MELLITUS WITH HYPERGLYCEMIA, UNSPECIFIED WHETHER LONG TERM INSULIN USE (HCC): Primary | Chronic | ICD-10-CM

## 2021-06-24 PROCEDURE — G2025 DIS SITE TELE SVCS RHC/FQHC: HCPCS | Performed by: NURSE PRACTITIONER

## 2021-06-24 RX ORDER — INSULIN GLARGINE 100 [IU]/ML
10 INJECTION, SOLUTION SUBCUTANEOUS NIGHTLY
Qty: 3 ML | Refills: 2 | Status: SHIPPED | OUTPATIENT
Start: 2021-06-24 | End: 2021-12-21

## 2021-06-24 NOTE — PROGRESS NOTES
"This was an audio and video enabled telemedicine encounter.    Chief Complaint  Hypertension and Diabetes    Subjective          Odessa Elena Walker presents to [facility]  History of Present Illness     The patient presents today via telehealth for a recheck on her diabetes, hypertension, and chronic kidney disease. We will also discuss her most recent lab work. She has already stopped Jardiance and Amaryl.     The patient reports that she has been depressed lately but has been making an effort to clean in spite of this, as it makes her feel better.    She states that she was given samples of Basaglar while in the office and has been taking this 10 units nightly. Her fasting blood sugars have been 140 to 150 since starting this. She does report discontinuing the Victoza 1.8 units on her own.  FSBS improved since last visit.     The patient is scheduled to have an MRI of the neck and shoulder tomorrow at the hospital.     She inquires whether her kidney function has improved since her last set of labs, as she has been started on a new medication by her nephrologist for this. She does not remember the name of her nephrologist but notes that she sees 2 different providers in [place].     According to the patient, her blood pressure has been around 99 over 50. This morning it was 106 over 56. She continues taking half of her lisinopril daily.     Objective   Vital Signs:   Ht 165.1 cm (65\")   Wt 52.6 kg (116 lb)   BMI 19.30 kg/m²     Physical Exam   Deferred, telehealth visit  Result Review :     Common labs    Common Labsle 6/11/21 6/11/21 6/11/21 6/11/21 6/16/21 6/22/21 6/22/21    1015 1015 1020 1020  0950 0950   Glucose 220 (A)     293 (A)    BUN 42 (A)     38 (A)    Creatinine 1.52 (A)     1.52 (A)    eGFR Non  Am 34 (A)     34 (A)    Sodium 135 (A)     135 (A)    Potassium 4.8     4.8    Chloride 102     101    Calcium 9.8     9.7    Albumin 4.10     4.00    Total " Bilirubin 0.5     0.6    Alkaline Phosphatase 54     53    AST (SGOT) 28     27    ALT (SGPT) 20     21    Hemoglobin     12.4 (A)     Hematocrit     38.4     Total Cholesterol   167       Triglycerides   89       HDL Cholesterol   63 (A)       LDL Cholesterol    88       Hemoglobin A1C  10.06 (A)     10.10 (A)   Microalbumin, Urine    <1.2      (A) Abnormal value            CMP    CMP 10/27/20 6/11/21 6/22/21   Glucose 178 (A) 220 (A) 293 (A)   BUN 30 (A) 42 (A) 38 (A)   Creatinine 1.24 (A) 1.52 (A) 1.52 (A)   eGFR Non  Am 43 (A) 34 (A) 34 (A)   Sodium 141 135 (A) 135 (A)   Potassium 4.6 4.8 4.8   Chloride 102 102 101   Calcium 10.3 (A) 9.8 9.7   Albumin 4.50 4.10 4.00   Total Bilirubin 0.6 0.5 0.6   Alkaline Phosphatase 47 54 53   AST (SGOT) 30 28 27   ALT (SGPT) 21 20 21   (A) Abnormal value            CBC    CBC 10/27/20 6/16/21   WBC 5.85    RBC 3.71 (A)    Hemoglobin 11.5 (A) 12.4 (A)   Hematocrit 35.5 38.4   MCV 95.7    MCH 31.0    MCHC 32.4    RDW 12.5    Platelets 200    (A) Abnormal value            CBC w/diff    CBC w/Diff 10/27/20 6/16/21   WBC 5.85    RBC 3.71 (A)    Hemoglobin 11.5 (A) 12.4 (A)   Hematocrit 35.5 38.4   MCV 95.7    MCH 31.0    MCHC 32.4    RDW 12.5    Platelets 200    Neutrophil Rel % 61.4    Lymphocyte Rel % 29.2    Monocyte Rel % 8.0    Eosinophil Rel % 0.7    Basophil Rel % 0.7    (A) Abnormal value            Lipid Panel    Lipid Panel 10/27/20 6/11/21   Total Cholesterol 142 (A) 167   Triglycerides 65 89   HDL Cholesterol 71 (A) 63 (A)   VLDL Cholesterol 13 16   LDL Cholesterol  58 88   LDL/HDL Ratio 0.82 1.37   (A) Abnormal value                          Assessment and Plan    Diagnoses and all orders for this visit:    1. Type 2 diabetes mellitus with hyperglycemia, unspecified whether long term insulin use (CMS/[facility]) (Primary)  -     linagliptin (Tradjenta) 5 MG tablet tablet; Take 1 tablet by mouth Daily.  Dispense: 30 tablet; Refill: 2  -     Insulin Glargine (BASAGLAR  KWIKPEN) 100 UNIT/ML injection pen; Inject 10 Units under the skin into the appropriate area as directed Every Night for 30 days.  Dispense: 3 mL; Refill: 2  -     Comprehensive metabolic panel; Future  -     Hemoglobin A1c; Future    2. Essential hypertension    3. Mixed hyperlipidemia      She will continue off of Jardiance and amaryl. Will start Tradjenta, stop Victoza, and will continue on Basaglar and Tradjenta is prescribed today, Basaglar was refilled. She will continue monitoring her blood pressures and blood sugars closely. A return visit in 2 weeks, sooner if needed. She will need to have labs repeated including A1c and CMP In 1 month. Call back sooner than 2 weeks if necessary. She will continue on lisinopril 20 mg daily and if blood pressure continues to be low, will consider decreasing this to 10 mg or stopping it depending upon blood pressure.  All questions and concerns are addressed with understanding noted. The patient is in agreement to above plan.     I spent 33 minutes caring for Odessa on this date of service. This time includes time spent by me in the following activities:preparing for the visit, reviewing tests, counseling and educating the patient/family/caregiver, ordering medications, tests, or procedures and documenting information in the medical record  Follow Up   Return in about 2 weeks (around 7/8/2021) for Recheck, or sooner as needed.  Patient was given instructions and counseling regarding her condition or for health maintenance advice. Please see specific information pulled into the AVS if appropriate.     You have chosen to receive care through a telephone visit. Do you consent to use a telephone visit for your medical care today? Yes    This visit has been rescheduled as a phone visit to comply with patient safety concerns in accordance with CDC recommendations. Total time of discussion was 33 minutes.    Scribed for SIDDHARTH Jim by Barbi Maldonado.  06/24/21   14:56  CDT    I have personally performed the services described in this document as scribed by the above individual, and it is both accurate and complete.  Kendal Mccoy, APRN  6/24/2021  15:23 CDT

## 2021-06-28 ENCOUNTER — TELEPHONE (OUTPATIENT)
Dept: FAMILY MEDICINE CLINIC | Facility: CLINIC | Age: 66
End: 2021-06-28

## 2021-06-28 DIAGNOSIS — M54.2 CERVICAL SPINE PAIN: Primary | ICD-10-CM

## 2021-06-28 NOTE — TELEPHONE ENCOUNTER
I advised the patient of the results of her Cervial Spine MRI. I read Kendal Mccoy's message to the patient. She advised she was checking her sugar. Patient perfers closest neurosurgeon as possible.     ----- Message from SIDDHARTH Craven sent at 6/28/2021  2:13 PM CDT -----  Inform pt , refer to neurosurg of choice and make sure she is monitoring her FSBS closely, needs good control of diabetes

## 2021-06-29 ENCOUNTER — TELEPHONE (OUTPATIENT)
Dept: PODIATRY | Facility: CLINIC | Age: 66
End: 2021-06-29

## 2021-06-29 NOTE — TELEPHONE ENCOUNTER
[facility] CALLED RE SCRIPT DR ZIMMERMAN SENT FOR PT DIABETIC SHOES AND INSERTS IS NOT A SERVICE THEY PROVIDE.  ANY QUESTIONS, PLEASE CALL BACK  [phone].  THANK YOU.

## 2021-06-30 ENCOUNTER — TELEPHONE (OUTPATIENT)
Dept: PULMONOLOGY | Facility: CLINIC | Age: 66
End: 2021-06-30

## 2021-06-30 LAB
BH CV LOWER ARTERIAL LEFT ABI RATIO: 1.2
BH CV LOWER ARTERIAL LEFT DORSALIS PEDIS SYS MAX: 123 MMHG
BH CV LOWER ARTERIAL LEFT POST TIBIAL SYS MAX: 130 MMHG
BH CV LOWER ARTERIAL RIGHT ABI RATIO: 1.22
BH CV LOWER ARTERIAL RIGHT DORSALIS PEDIS SYS MAX: 132 MMHG
BH CV LOWER ARTERIAL RIGHT POST TIBIAL SYS MAX: 130 MMHG
MAXIMAL PREDICTED HEART RATE: 153 BPM
STRESS TARGET HR: 130 BPM
UPPER ARTERIAL LEFT ARM BRACHIAL SYS MAX: 105 MMHG
UPPER ARTERIAL RIGHT ARM BRACHIAL SYS MAX: 108 MMHG

## 2021-07-08 ENCOUNTER — OFFICE VISIT (OUTPATIENT)
Dept: FAMILY MEDICINE CLINIC | Facility: CLINIC | Age: 66
End: 2021-07-08

## 2021-07-08 VITALS — BODY MASS INDEX: 19.33 KG/M2 | HEIGHT: 65 IN | WEIGHT: 116 LBS

## 2021-07-08 DIAGNOSIS — Z78.0 POST-MENOPAUSAL: ICD-10-CM

## 2021-07-08 DIAGNOSIS — E11.65 TYPE 2 DIABETES MELLITUS WITH HYPERGLYCEMIA, UNSPECIFIED WHETHER LONG TERM INSULIN USE (HCC): Chronic | ICD-10-CM

## 2021-07-08 DIAGNOSIS — I10 ESSENTIAL HYPERTENSION: Primary | Chronic | ICD-10-CM

## 2021-07-08 PROCEDURE — G2025 DIS SITE TELE SVCS RHC/FQHC: HCPCS | Performed by: NURSE PRACTITIONER

## 2021-07-08 RX ORDER — PNEUMOCOCCAL VACCINE POLYVALENT 25; 25; 25; 25; 25; 25; 25; 25; 25; 25; 25; 25; 25; 25; 25; 25; 25; 25; 25; 25; 25; 25; 25 UG/.5ML; UG/.5ML; UG/.5ML; UG/.5ML; UG/.5ML; UG/.5ML; UG/.5ML; UG/.5ML; UG/.5ML; UG/.5ML; UG/.5ML; UG/.5ML; UG/.5ML; UG/.5ML; UG/.5ML; UG/.5ML; UG/.5ML; UG/.5ML; UG/.5ML; UG/.5ML; UG/.5ML; UG/.5ML; UG/.5ML
0.5 INJECTION, SOLUTION INTRAMUSCULAR; SUBCUTANEOUS ONCE
Qty: 0.5 ML | Refills: 0 | Status: SHIPPED | OUTPATIENT
Start: 2021-07-08 | End: 2021-07-08

## 2021-07-08 NOTE — PROGRESS NOTES
"This was an audio and video enabled telemedicine encounter.    Chief Complaint  Diabetes    Subjective          The patient presents today via telephone for a recheck of diabetes mellitus and hypertension. She reports blood glucose readings of 91, 134, and this morning she had a reading of 132. She reports blood pressure readings as follows; on 07/01/2021 with a reading of 98/60, 07/02/2021 with a reading of 111/60, and 07/03/2021 with a reading of 112/65, and 07/08/2021 with a reading of 96/59 at 5:50 AM. Pulse readings are as follows; on 07/08/2021 was 66, on 07/07/2021 was 68, 07/05/2021 was 63.     Additionally, she reports her energy level has improved, as well as increased appetite.     Odessa Walker presents to [facility]  History of Present Illness    Objective   Vital Signs:   Ht 165.1 cm (65\")   Wt 52.6 kg (116 lb) Comment: patient  BMI 19.30 kg/m²     Physical Exam  Constitutional:       Comments: Deferred, telephone visit.        Result Review :     Common labs    Common Labsle 6/11/21 6/11/21 6/11/21 6/11/21 6/16/21 6/22/21 6/22/21    1015 1015 1020 1020  0950 0950   Glucose 220 (A)     293 (A)    BUN 42 (A)     38 (A)    Creatinine 1.52 (A)     1.52 (A)    eGFR Non  Am 34 (A)     34 (A)    Sodium 135 (A)     135 (A)    Potassium 4.8     4.8    Chloride 102     101    Calcium 9.8     9.7    Albumin 4.10     4.00    Total Bilirubin 0.5     0.6    Alkaline Phosphatase 54     53    AST (SGOT) 28     27    ALT (SGPT) 20     21    Hemoglobin     12.4 (A)     Hematocrit     38.4     Total Cholesterol   167       Triglycerides   89       HDL Cholesterol   63 (A)       LDL Cholesterol    88       Hemoglobin A1C  10.06 (A)     10.10 (A)   Microalbumin, Urine    <1.2      (A) Abnormal value            CMP    CMP 10/27/20 6/11/21 6/22/21   Glucose 178 (A) 220 (A) 293 (A)   BUN 30 (A) 42 (A) 38 (A)   Creatinine 1.24 (A) 1.52 (A) 1.52 (A)   eGFR Non  Am 43 (A) 34 " (A) 34 (A)   Sodium 141 135 (A) 135 (A)   Potassium 4.6 4.8 4.8   Chloride 102 102 101   Calcium 10.3 (A) 9.8 9.7   Albumin 4.50 4.10 4.00   Total Bilirubin 0.6 0.5 0.6   Alkaline Phosphatase 47 54 53   AST (SGOT) 30 28 27   ALT (SGPT) 21 20 21   (A) Abnormal value            CBC    CBC 10/27/20 6/16/21   WBC 5.85    RBC 3.71 (A)    Hemoglobin 11.5 (A) 12.4 (A)   Hematocrit 35.5 38.4   MCV 95.7    MCH 31.0    MCHC 32.4    RDW 12.5    Platelets 200    (A) Abnormal value            CBC w/diff    CBC w/Diff 10/27/20 6/16/21   WBC 5.85    RBC 3.71 (A)    Hemoglobin 11.5 (A) 12.4 (A)   Hematocrit 35.5 38.4   MCV 95.7    MCH 31.0    MCHC 32.4    RDW 12.5    Platelets 200    Neutrophil Rel % 61.4    Lymphocyte Rel % 29.2    Monocyte Rel % 8.0    Eosinophil Rel % 0.7    Basophil Rel % 0.7    (A) Abnormal value            Lipid Panel    Lipid Panel 10/27/20 6/11/21   Total Cholesterol 142 (A) 167   Triglycerides 65 89   HDL Cholesterol 71 (A) 63 (A)   VLDL Cholesterol 13 16   LDL Cholesterol  58 88   LDL/HDL Ratio 0.82 1.37   (A) Abnormal value                A1C Last 3 Results    HGBA1C Last 3 Results 10/27/20 6/11/21 6/22/21   Hemoglobin A1C 7.12 (A) 10.06 (A) 10.10 (A)   (A) Abnormal value                      Assessment and Plan    Diagnoses and all orders for this visit:    1. Essential hypertension (Primary)    2. Type 2 diabetes mellitus with hyperglycemia, unspecified whether long term insulin use (CMS/[facility])  Comments:  improving      3. Post-menopausal  -     DEXA Bone Density Axial; Future    Other orders  -     Zoster Vac Recomb Adjuvanted 50 MCG/0.5ML reconstituted suspension; Inject 0.5 mL into the appropriate muscle as directed by prescriber 1 (One) Time for 1 dose.  Dispense: 1 each; Refill: 1  -     pneumococcal polysaccharide 23-valent (Pneumovax 23) 25 MCG/0.5ML vaccine; Inject 0.5 mL into the appropriate muscle as directed by prescriber 1 (One) Time for 1 dose.  Dispense: 0.5 mL; Refill: 0      She will  continue to monitor her blood sugar and blood pressures closely. She will be asked to return in the next 2 weeks for lab work. We will recheck BUN, creatinine, A1C, and glucose specifically and will inform her of results by telephone. A1C should be greatly improved as the blood sugars she reported today have significantly improved as well. She will continue on all medications as prescribed previously and no medication refills are needed today. She will follow-up in 3-months for recheck or sooner if needed.     Health maintenance is reviewed, Pneumovax 23 and Shingrix are ordered for her to have at the pharmacy per her request. Bone density scan is ordered and she can schedule this at her convenience. Her mammogram is up to date.    All questions and concerns are addressed with understanding noted. The patient is in agreement to above plan.    I spent 33 minutes caring for Odessa on this date of service. This time includes time spent by me in the following activities:preparing for the visit, reviewing tests, performing a medically appropriate examination and/or evaluation , counseling and educating the patient/family/caregiver, ordering medications, tests, or procedures and documenting information in the medical record  Follow Up   Return in about 3 months (around 10/8/2021), or if symptoms worsen or fail to improve, for Recheck, or sooner as needed.  Patient was given instructions and counseling regarding her condition or for health maintenance advice. Please see specific information pulled into the AVS if appropriate.     This visit has been rescheduled as a phone visit to comply with patient safety concerns in accordance with CDC recommendations. Total time of discussion was 33 minutes.    You have chosen to receive care through a telephone visit. Do you consent to use a telephone visit for your medical care today? yes    Scribed for SIDDHARTH Jim by Danielle Finnegan.  07/08/21   10:33 CDT    I have personally  performed the services described in this document as scribed by the above individual, and it is both accurate and complete.  Kendal Mccoy, SIDDHARTH  7/8/2021  10:49 CDT

## 2021-07-16 ENCOUNTER — LAB (OUTPATIENT)
Dept: LAB | Facility: OTHER | Age: 66
End: 2021-07-16

## 2021-07-16 DIAGNOSIS — E11.65 TYPE 2 DIABETES MELLITUS WITH HYPERGLYCEMIA, UNSPECIFIED WHETHER LONG TERM INSULIN USE (HCC): Chronic | ICD-10-CM

## 2021-07-16 LAB
ALBUMIN SERPL-MCNC: 4.1 G/DL (ref 3.5–5.2)
ALBUMIN/GLOB SERPL: 1.5 G/DL
ALP SERPL-CCNC: 57 U/L (ref 39–117)
ALT SERPL W P-5'-P-CCNC: 15 U/L (ref 1–33)
ANION GAP SERPL CALCULATED.3IONS-SCNC: 7 MMOL/L (ref 5–15)
AST SERPL-CCNC: 20 U/L (ref 1–32)
BILIRUB SERPL-MCNC: 0.5 MG/DL (ref 0–1.2)
BUN SERPL-MCNC: 33 MG/DL (ref 8–23)
BUN/CREAT SERPL: 21.2 (ref 7–25)
CALCIUM SPEC-SCNC: 9.6 MG/DL (ref 8.6–10.5)
CHLORIDE SERPL-SCNC: 96 MMOL/L (ref 98–107)
CO2 SERPL-SCNC: 28 MMOL/L (ref 22–29)
CREAT SERPL-MCNC: 1.56 MG/DL (ref 0.57–1)
GFR SERPL CREATININE-BSD FRML MDRD: 33 ML/MIN/1.73
GLOBULIN UR ELPH-MCNC: 2.7 GM/DL
GLUCOSE SERPL-MCNC: 138 MG/DL (ref 65–99)
POTASSIUM SERPL-SCNC: 4.8 MMOL/L (ref 3.5–5.2)
PROT SERPL-MCNC: 6.8 G/DL (ref 6–8.5)
SODIUM SERPL-SCNC: 131 MMOL/L (ref 136–145)

## 2021-07-16 PROCEDURE — 36415 COLL VENOUS BLD VENIPUNCTURE: CPT | Performed by: NURSE PRACTITIONER

## 2021-07-16 PROCEDURE — 80053 COMPREHEN METABOLIC PANEL: CPT | Performed by: NURSE PRACTITIONER

## 2021-07-16 PROCEDURE — 83036 HEMOGLOBIN GLYCOSYLATED A1C: CPT | Performed by: NURSE PRACTITIONER

## 2021-07-17 LAB — HBA1C MFR BLD: 10.49 % (ref 4.8–5.6)

## 2021-07-19 DIAGNOSIS — R79.9 ELEVATED BUN: Primary | ICD-10-CM

## 2021-07-19 DIAGNOSIS — R79.89 ELEVATED SERUM CREATININE: ICD-10-CM

## 2021-08-10 RX ORDER — LISINOPRIL 40 MG/1
TABLET ORAL
Qty: 15 TABLET | Refills: 5 | Status: SHIPPED | OUTPATIENT
Start: 2021-08-10 | End: 2022-01-25

## 2021-08-12 RX ORDER — RISEDRONATE SODIUM 35 MG/1
35 TABLET, FILM COATED ORAL
Qty: 4 TABLET | Refills: 11 | Status: SHIPPED | OUTPATIENT
Start: 2021-08-12 | End: 2021-11-15 | Stop reason: SINTOL

## 2021-08-19 ENCOUNTER — PRIOR AUTHORIZATION (OUTPATIENT)
Dept: FAMILY MEDICINE CLINIC | Facility: CLINIC | Age: 66
End: 2021-08-19

## 2021-08-19 RX ORDER — PEN NEEDLE, DIABETIC 31 GX5/16"
NEEDLE, DISPOSABLE MISCELLANEOUS
Qty: 100 EACH | Refills: 5 | Status: SHIPPED | OUTPATIENT
Start: 2021-08-19 | End: 2022-10-17

## 2021-08-19 NOTE — TELEPHONE ENCOUNTER
ZHENG DAVIS Key: FREDY MCKEON Case ID: [id number] - Rx #: [id number] help? Call us at [phone]  Outcome  Approvedon August 18 2021  Your request has been approved  Drug  Risedronate Sodium 35MG tablets  Form  [employer] Electronic [place] Form (2017 NCPDP

## 2021-08-30 RX ORDER — FLUTICASONE PROPIONATE 50 MCG
SPRAY, SUSPENSION (ML) NASAL
Qty: 16 G | Refills: 3 | Status: SHIPPED | OUTPATIENT
Start: 2021-08-30

## 2021-08-30 NOTE — TELEPHONE ENCOUNTER
Rx Refill Note  Requested Prescriptions     Pending Prescriptions Disp Refills   • fluticasone (FLONASE) 50 MCG/ACT nasal spray [Pharmacy Med Name: FLUTICASONE 50MCG SPRAY 50 ROBINA] 16 g 1     Sig: USE 2 SPRAYS IN EACH NOSTRIL DAILY      Last office visit with prescribing clinician: 7/8/2021    Next appt due around 10/08/2021.  Next office visit with prescribing clinician: Visit date not found            Melina Green LPN  08/30/21, 12:18 CDT

## 2021-09-01 ENCOUNTER — TELEPHONE (OUTPATIENT)
Dept: FAMILY MEDICINE CLINIC | Facility: CLINIC | Age: 66
End: 2021-09-01

## 2021-09-01 NOTE — TELEPHONE ENCOUNTER
Patient advised to stop the Actonel for 2 months and notify us if the pain is resolved. If the pain gets worse within the next 2 months the patient needs to call us right away. Understanding was voiced.       ----- Message from SIDDHARTH Craven sent at 9/1/2021  1:08 PM CDT -----  Please stop the medicine for 2 months and let me know at that time if the pain has completely resolved, if worse call back sooner   ----- Message -----  From: Gabby Pappas MA  Sent: 9/1/2021  12:55 PM CDT  To: SIDDHARTH Craven    Please advise.   Medication was just started.   ----- Message -----  From: Maryam Lindquist  Sent: 9/1/2021  11:02 AM CDT  To: Gabby Pappas MA    Odessa states, she has been taking her Bone medication (Actonel). Since she has been taking this medication she is having severe left pain and can't handy walk now.( x2-3 days).  Please advise

## 2021-09-02 DIAGNOSIS — E11.65 TYPE 2 DIABETES MELLITUS WITH HYPERGLYCEMIA, WITHOUT LONG-TERM CURRENT USE OF INSULIN (HCC): Chronic | ICD-10-CM

## 2021-09-02 RX ORDER — CALCIUM CITRATE/VITAMIN D3 200MG-6.25
TABLET ORAL
Qty: 100 EACH | Refills: 5 | Status: SHIPPED | OUTPATIENT
Start: 2021-09-02 | End: 2022-09-14

## 2021-09-25 DIAGNOSIS — E11.65 TYPE 2 DIABETES MELLITUS WITH HYPERGLYCEMIA, UNSPECIFIED WHETHER LONG TERM INSULIN USE (HCC): Chronic | ICD-10-CM

## 2021-09-27 RX ORDER — LINAGLIPTIN 5 MG/1
TABLET, FILM COATED ORAL
Qty: 30 TABLET | Refills: 2 | Status: SHIPPED | OUTPATIENT
Start: 2021-09-27 | End: 2021-11-22

## 2021-10-06 RX ORDER — ROSUVASTATIN CALCIUM 10 MG/1
TABLET, COATED ORAL
Qty: 30 TABLET | Refills: 0 | Status: SHIPPED | OUTPATIENT
Start: 2021-10-06 | End: 2021-11-01

## 2021-10-06 RX ORDER — RANOLAZINE 500 MG/1
500 TABLET, EXTENDED RELEASE ORAL 2 TIMES DAILY
Qty: 60 TABLET | Refills: 0 | Status: SHIPPED | OUTPATIENT
Start: 2021-10-06 | End: 2021-11-01

## 2021-11-01 RX ORDER — ROSUVASTATIN CALCIUM 10 MG/1
TABLET, COATED ORAL
Qty: 30 TABLET | Refills: 5 | Status: SHIPPED | OUTPATIENT
Start: 2021-11-01 | End: 2022-04-20

## 2021-11-01 RX ORDER — RANOLAZINE 500 MG/1
500 TABLET, EXTENDED RELEASE ORAL 2 TIMES DAILY
Qty: 60 TABLET | Refills: 5 | Status: SHIPPED | OUTPATIENT
Start: 2021-11-01 | End: 2022-04-20

## 2021-11-12 ENCOUNTER — OFFICE VISIT (OUTPATIENT)
Dept: CARDIOLOGY | Facility: CLINIC | Age: 66
End: 2021-11-12

## 2021-11-12 VITALS
HEIGHT: 65 IN | SYSTOLIC BLOOD PRESSURE: 104 MMHG | WEIGHT: 125.6 LBS | DIASTOLIC BLOOD PRESSURE: 80 MMHG | HEART RATE: 75 BPM | OXYGEN SATURATION: 95 % | BODY MASS INDEX: 20.93 KG/M2

## 2021-11-12 DIAGNOSIS — R00.2 PALPITATIONS: Primary | ICD-10-CM

## 2021-11-12 PROCEDURE — 99213 OFFICE O/P EST LOW 20 MIN: CPT | Performed by: INTERNAL MEDICINE

## 2021-11-12 PROCEDURE — 93000 ELECTROCARDIOGRAM COMPLETE: CPT | Performed by: INTERNAL MEDICINE

## 2021-11-12 RX ORDER — LIRAGLUTIDE 6 MG/ML
1.2 INJECTION SUBCUTANEOUS DAILY
COMMUNITY
Start: 2021-07-12 | End: 2021-11-22

## 2021-11-12 RX ORDER — GLIPIZIDE 10 MG/1
1 TABLET ORAL
COMMUNITY
Start: 2021-06-15 | End: 2021-11-15

## 2021-11-12 NOTE — PROGRESS NOTES
Odessa Walker  66 y.o. female    11/12/2021  Chief Complaint   Patient presents with   • Palpitations     Chief Complaint       History of Present Illness    Palpitations  -        SUBJECTIVE  Patient said her heartbeat was doing quite well.  She is not having any irregularity.  She has no chest pain or shortness of air.  She has not passed out or anything.  She says she feels very good.  Allergies   Allergen Reactions   • Penicillins Rash         Past Medical History:   Diagnosis Date   • Arthritis     BACK, KNEES AND SHOULDER   • Arthritis    • Back pain    • Benign essential hypertension    • Candidiasis of skin and nail     L foot   • Chest pain    • Chronic anemia    • Claudication (HCC)    • Corns and callosities    • Depressive disorder    • Dermatitis    • Disease of nail     other specified   • Disorder of peripheral nervous system    • Displaced fracture of third metatarsal bone, right foot, initial encounter for closed fracture    • Displaced fracture of third metatarsal bone, right foot, subsequent encounter for fracture with routine healing    • Diverticular disease of colon    • Dog bite of hand    • Epigastric pain    • Essential hypertension    • Foot pain    • GERD (gastroesophageal reflux disease)    • H/O screening mammography    • Hallux valgus    • Hammer toe    • Heartburn    • Hyperlipidemia    • Hypertension    • Joint pain    • Joint swelling    • Neurologic disorder associated with diabetes mellitus (HCC)     type 2   • Nondisplaced fracture of fourth metatarsal bone, right foot, initial encounter for closed fracture    • Nondisplaced fracture of fourth metatarsal bone, right foot, subsequent encounter for fracture with routine healing    • Osteoporosis    • Pain in right leg    • Superficial laceration of hand    • Type 2 diabetes mellitus (HCC)    • Urinary tract infectious disease    • Venous insufficiency (chronic) (peripheral)          Past Surgical History:   Procedure Laterality  Date   • BACK SURGERY  2007   • COLONOSCOPY  11/11/2015    Diverticulosis found in the sigmoid colon. Hemorrhoids found in the anus.   • HARDWARE REMOVAL Right 7/19/2019    Procedure: excision of loose body in right knee.;  Surgeon: Pradeep Jacobs MD;  Location: [facility];  Service: Orthopedics   • KNEE SURGERY Right 2005   • OOPHORECTOMY     • OTHER SURGICAL HISTORY  05/06/2015    DEBRIDE NAIL 6 OR MORE    • OTHER SURGICAL HISTORY  05/06/2015    PARING CORN/CALLUS    • OTHER SURGICAL HISTORY  10/25/2015    TREAT METATARSAL FRACTURE    • UPPER GASTROINTESTINAL ENDOSCOPY  11/11/2015    Esophagitis seen.Biopsy taken.A hiatus hernia was found in the esophagus.Gastritis found in the stomach.Biopsy taken   • US VENOUS EXTREMITY UNILATERAL  01/26/2016   • VAGINAL HYSTERECTOMY SALPINGO OOPHORECTOMY  1999         Family History   Problem Relation Age of Onset   • Heart disease Mother    • Diabetes Mother    • Hypertension Mother    • Osteoarthritis Father    • Diabetes Other    • Hypertension Other    • Kidney disease Other    • Ovarian cancer Sister    • Heart disease Sister    • Diabetes Sister    • Hypertension Sister    • Diabetes Brother    • Hypertension Brother          Social History     Socioeconomic History   • Marital status:    Tobacco Use   • Smoking status: Former Smoker     Packs/day: 0.50     Years: 8.00     Pack years: 4.00     Types: Cigarettes   • Smokeless tobacco: Never Used   • Tobacco comment: quit smoking >20 yr ago   Vaping Use   • Vaping Use: Never used   Substance and Sexual Activity   • Alcohol use: No   • Drug use: No   • Sexual activity: Defer         Prior to Admission medications    Medication Sig Start Date End Date Taking? Authorizing Provider   aspirin 81 MG chewable tablet Chew 81 mg Daily.   Yes ProviderDee MD   Calcium Carbonate (CALCIUM 600 PO) Take 1 tablet by mouth Daily.   Yes Dee Singleton MD   Flax Oil-Fish Oil-Borage Oil (FISH OIL-FLAX OIL-BORAGE  OIL PO) Take 1 tablet by mouth 2 (Two) Times a Day.   Yes Dee Singleton MD   fluticasone (FLONASE) 50 MCG/ACT nasal spray USE 2 SPRAYS IN EACH NOSTRIL DAILY 8/30/21  Yes Kendal Mccoy APRN   glipizide (GLUCOTROL) 10 MG tablet Take 1 tablet by mouth 2 (Two) Times a Day Before Meals. 6/15/21  Yes Dee Singleton MD   Glucosamine HCl (GLUCOSAMINE PO) Take 1,000 mg by mouth.   Yes Dee Singleton MD   glucose monitor monitoring kit 1 each Daily. DX:E11.9 1/24/19  Yes Kendal Mccoy APRN   glucose monitor monitoring kit 1 each Daily. DX E11.9 10/28/20  Yes Kenadl Mccoy APRN   hydrOXYzine (ATARAX) 25 MG tablet Take 1-2 po qhs prn sleep 3/12/20  Yes Kendal Mccoy APRN   Insulin Pen Needle (Easy Touch Pen Needles) 31G X 8 MM misc USE AS INSTRUCTED WITH VICTOZA 8/19/21  Yes Kendal Mccoy APRN   IRON PO Take 65 mg by mouth Daily.   Yes Dee Singleton MD   Lancets misc Once daily 1/24/19  Yes Kendal Mccoy APRN   Liraglutide (Victoza) 18 MG/3ML solution pen-injector injection Inject 1.2 mg under the skin into the appropriate area as directed Daily. 7/12/21  Yes Dee Singleton MD   lisinopril (PRINIVIL,ZESTRIL) 40 MG tablet TAKE 1/2 TABLET BY MOUTH DAILY 8/10/21  Yes Kendal Mccoy APRN   magnesium oxide (MAG-OX) 400 MG tablet Take 1 tablet by mouth 2 (Two) Times a Day. 5/12/21  Yes Dee Singleton MD   magnesium oxide (MAGOX) 400 (241.3 Mg) MG tablet tablet Take 400 mg by mouth 2 (Two) Times a Day.   Yes Dee Singleton MD   Multiple Vitamins-Minerals (ONE-A-DAY 50 PLUS PO) Take 1 tablet by mouth Daily.   Yes Dee Singleton MD   ONE TOUCH LANCETS misc 1 applicator Daily. 4/11/18  Yes Kendal Mccoy APRN   ranolazine (RANEXA) 500 MG 12 hr tablet TAKE 1 TABLET BY MOUTH 2 (TWO) TIMES A DAY. 11/1/21  Yes Leslie Kapadia MD   risedronate (Actonel) 35 MG tablet Take 1 tablet by mouth Every 7 (Seven) Days. with water on empty stomach, nothing by mouth or lie down  "for next 30 minutes. 8/12/21  Yes Kendal Mccoy APRN   rosuvastatin (CRESTOR) 10 MG tablet TAKE 1 TABLET BY MOUTH DAILY. **MD GUZMAN DRUG INTERACTION WITH RANEXA**10/27/20 HC 11/1/21  Yes Leslie Kapadia MD   sertraline (ZOLOFT) 50 MG tablet TAKE 1 TABLET BY MOUTH DAILY 3/16/21  Yes Kendal Mccoy APRN   sulindac (CLINORIL) 200 MG tablet Take 200 mg by mouth 2 (Two) Times a Day.   Yes ProviderDee MD   Tradjenta 5 MG tablet tablet TAKE 1 TABLET BY MOUTH DAILY. 9/27/21  Yes Kendal Mccoy APRN   True Metrix Blood Glucose Test test strip 1 EACH BY OTHER ROUTE DAILY. USE TO TEST BLOOD SUGAR ONCE DAILY 9/2/21  Yes Kendal Mccoy APRN   Insulin Glargine (BASAGLAR KWIKPEN) 100 UNIT/ML injection pen Inject 10 Units under the skin into the appropriate area as directed Every Night for 30 days. 6/24/21 7/24/21  Kendal Mccoy APRN         OBJECTIVE    /80 (BP Location: Left arm, Patient Position: Sitting, Cuff Size: Adult)   Pulse 75   Ht 165.1 cm (65\")   Wt 57 kg (125 lb 9.6 oz)   SpO2 95%   BMI 20.90 kg/m²         Review of Systems     Constitutional:  Denies recent weight loss, weight gain, fever or chills, no change in exercise tolerance     HENT:  Denies any hearing loss, epistaxis, hoarseness, or difficulty speaking.     Eyes: Wears eyeglasses or contact lenses     Respiratory:  Denies dyspnea with exertion,no cough, wheezing, or hemoptysis.     Cardiovascular: Negative for palpations, chest pain, orthopnea, PND, peripheral edema, syncope, or claudication.     Gastrointestinal:  Denies change in bowel habits, dyspepsia, ulcer disease, hematochezia, or melena.     Endocrine: Negative for cold intolerance, heat intolerance, polydipsia, polyphagia and polyuria. Denies any history of weight change, heat/cold intolerance, polydipsia, polyuria     Genitourinary: Negative.      Musculoskeletal: Denies any history of arthritic symptoms or back problems     Skin:  Denies any change in hair or nails, " rashes, or skin lesions.     Allergic/Immunologic: Negative.  Negative for environmental allergies, food allergies and immunocompromised state.     Neurological:  Denies any history of recurrent headaches, strokes, TIA, or seizure disorder.     Hematological: Denies any food allergies, seasonal allergies, bleeding disorders, or lymphadenopathy.     Psychiatric/Behavioral: Denies any history of depression, substance abuse, or change in cognitive function.         Physical Exam     Constitutional: Cooperative, alert and oriented, well-developed, well-nourished, in no acute distress.     HENT:   Head: Normocephalic, normal hair patterns, no masses or tenderness.  Ears, Nose, and Throat: No gross abnormalities. No pallor or cyanosis. Dentition good.   Eyes: EOMS intact, PERRL, conjunctivae and lids unremarkable. Fundoscopic exam and visual fields not performed.   Neck: No palpable masses or adenopathy, no thyromegaly, no JVD, carotid pulses are full and equal bilaterally and without  Bruits.     Cardiovascular: Regular rhythm, S1 and S2 normal, no S3 or S4. Apical impulse not displaced. No murmurs, gallops, or rubs detected.     Pulmonary/Chest: Chest: normal symmetry, no tenderness to palpation, normal respiratory excursion, no intercostal retraction, no use of accessory muscles.            Pulmonary: Normal breath sounds. No rales or ronchi.    Abdominal: Abdomen soft, bowel sounds normoactive, no masses, no hepatosplenomegaly, non-tender, no bruits.     Musculoskeletal: No deformities, clubbing, cyanosis, erythema, or edema observed. There are no spinal abnormalities noted. Normal muscle strength and tone. Pulses full and equal in all extremities, no bruits auscultated.     Neurological: No gross motor or sensory deficits noted, affect appropriate, oriented to time, person, place.     Skin: Warm and dry to the touch, no apparent skin lesions or masses noted.     Psychiatric: She has a normal mood and affect. Her  behavior is normal. Judgment and thought content normal.         Procedures      Lab Results   Component Value Date    WBC 5.85 10/27/2020    HGB 10.9 (L) 08/16/2021    HCT 33.0 (L) 08/16/2021    MCV 95.7 10/27/2020     10/27/2020     Lab Results   Component Value Date    GLUCOSE 138 (H) 07/16/2021    BUN 33 (H) 07/16/2021    CREATININE 1.56 (H) 07/16/2021    EGFRIFNONA 33 (L) 07/16/2021    BCR 21.2 07/16/2021    CO2 28.0 07/16/2021    CALCIUM 9.6 07/16/2021    ALBUMIN 4.10 07/16/2021    AST 20 07/16/2021    ALT 15 07/16/2021     Lab Results   Component Value Date    CHOL 167 06/11/2021    CHOL 142 (L) 10/27/2020    CHOL 135 (L) 03/03/2020     Lab Results   Component Value Date    TRIG 89 06/11/2021    TRIG 65 10/27/2020    TRIG 79 03/03/2020     Lab Results   Component Value Date    HDL 63 (H) 06/11/2021    HDL 71 (H) 10/27/2020    HDL 68 (H) 03/03/2020     No components found for: LDLCALC  Lab Results   Component Value Date    LDL 88 06/11/2021    LDL 58 10/27/2020    LDL 51 03/03/2020     No results found for: HDLLDLRATIO  No components found for: CHOLHDL  Lab Results   Component Value Date    HGBA1C 10.49 (H) 07/16/2021     Lab Results   Component Value Date    TSH 3.150 03/03/2020    I0OSBDG 117 05/31/2016    Q7JUBRH 8.89 03/03/2020           ASSESSMENT AND PLAN      Diagnoses and all orders for this visit:    1. Palpitations (Primary)  -     ECG 12 Lead    She underwent a event monitor which showed no significant abnormality.  She has had no significant palpitations since her last visit.  We will continue to have her monitor herself and her symptoms.    Patient's Body mass index is 20.9 kg/m². indicating that she is within normal range (BMI 18.5-24.9). No BMI management plan needed..                Leslie Kapadia MD  11/12/2021  11:21 CST

## 2021-11-15 ENCOUNTER — OFFICE VISIT (OUTPATIENT)
Dept: FAMILY MEDICINE CLINIC | Facility: CLINIC | Age: 66
End: 2021-11-15

## 2021-11-15 VITALS
DIASTOLIC BLOOD PRESSURE: 60 MMHG | HEIGHT: 65 IN | HEART RATE: 82 BPM | BODY MASS INDEX: 20.76 KG/M2 | SYSTOLIC BLOOD PRESSURE: 102 MMHG | OXYGEN SATURATION: 98 % | WEIGHT: 124.6 LBS

## 2021-11-15 DIAGNOSIS — R79.89 ELEVATED SERUM CREATININE: ICD-10-CM

## 2021-11-15 DIAGNOSIS — E78.2 MIXED HYPERLIPIDEMIA: ICD-10-CM

## 2021-11-15 DIAGNOSIS — Z23 NEED FOR PNEUMOCOCCAL VACCINE: ICD-10-CM

## 2021-11-15 DIAGNOSIS — Z23 FLU VACCINE NEED: Primary | ICD-10-CM

## 2021-11-15 DIAGNOSIS — E11.65 TYPE 2 DIABETES MELLITUS WITH HYPERGLYCEMIA, WITHOUT LONG-TERM CURRENT USE OF INSULIN (HCC): ICD-10-CM

## 2021-11-15 DIAGNOSIS — R79.9 ELEVATED BUN: ICD-10-CM

## 2021-11-15 DIAGNOSIS — Z23 NEED FOR VACCINATION: ICD-10-CM

## 2021-11-15 DIAGNOSIS — I10 ESSENTIAL HYPERTENSION: ICD-10-CM

## 2021-11-15 DIAGNOSIS — Z00.00 MEDICARE ANNUAL WELLNESS VISIT, SUBSEQUENT: Primary | ICD-10-CM

## 2021-11-15 DIAGNOSIS — M85.80 OSTEOPENIA, UNSPECIFIED LOCATION: ICD-10-CM

## 2021-11-15 PROCEDURE — 1159F MED LIST DOCD IN RCRD: CPT | Performed by: NURSE PRACTITIONER

## 2021-11-15 PROCEDURE — G0008 ADMIN INFLUENZA VIRUS VAC: HCPCS | Performed by: NURSE PRACTITIONER

## 2021-11-15 PROCEDURE — 1126F AMNT PAIN NOTED NONE PRSNT: CPT | Performed by: NURSE PRACTITIONER

## 2021-11-15 PROCEDURE — 90662 IIV NO PRSV INCREASED AG IM: CPT | Performed by: NURSE PRACTITIONER

## 2021-11-15 PROCEDURE — G0009 ADMIN PNEUMOCOCCAL VACCINE: HCPCS | Performed by: NURSE PRACTITIONER

## 2021-11-15 PROCEDURE — 99214 OFFICE O/P EST MOD 30 MIN: CPT | Performed by: NURSE PRACTITIONER

## 2021-11-15 PROCEDURE — G0439 PPPS, SUBSEQ VISIT: HCPCS | Performed by: NURSE PRACTITIONER

## 2021-11-15 PROCEDURE — 90732 PPSV23 VACC 2 YRS+ SUBQ/IM: CPT | Performed by: NURSE PRACTITIONER

## 2021-11-15 NOTE — PROGRESS NOTES
"Chief Complaint  Medicare Wellness-subsequent, Immunizations, and Ear Problem (Feels like her right ear is getting stopped up)    Subjective          The patient presents today for recheck of hyperlipidemia, hypertension, diabetes, GERD, anxiety, and depression. She is accompanied by her sister today. The patient reports she is still having problems with her blood sugar. She adds that her blood sugar has been ranging from \"200s, 300s, sometimes 400s\". The patient notes that she is taking glipizide in the morning and victoza at night-time. She notes she has not been seen in the office since 07/2021. The patient notes that she has concerns of seeing a specialist out of town due to insurance coverage concerns. She is actually unsure if she is taking glipizide.    The patient is unsure if she needs medication refills today.     Additionally, she reports that she is taking over-the-counter aspirin for arthritis. The patient notes she had \"problems\" and was unable to ambulate as a child. She reports pain that radiates from her patella inferiorly to her metatarsal and this happens intermittently every 10 to 15 years. The patient notes she is not falling as frequently due to using ambulatory assistive devices. The patient notes her neck is bothering her; however, she is confident this is due to her arthritis. She reports taking an over-the-counter aspirin. She reports she is unable to take bisphosphonate due to bilat leg pain, will need to change this to other osteopenia medication. Pt aware.     She reports difficulty hearing out of her ears; she is unsure if this is due to cerumen.    She reports her chest pain is unchanged and does not last for a long period of time; \"it is just for a few seconds and it is gone\". The patient denies difficulty breathing.    The patient has received her influenza vaccine, as well as her COVID-19 vaccination, Moderna. She has received a pneumonia vaccine in the past and she is due for this " "at present.    Odessa Walker presents to [facility]  History of Present Illness    Objective   Vital Signs:   /60   Pulse 82   Ht 165.1 cm (65\")   Wt 56.5 kg (124 lb 9.6 oz)   SpO2 98%   BMI 20.73 kg/m²     Physical Exam  Vitals and nursing note reviewed.   Constitutional:       General: She is not in acute distress.     Appearance: Normal appearance. She is normal weight. She is not ill-appearing, toxic-appearing or diaphoretic.      Comments: She has diabetic shoes on today.   HENT:      Head: Normocephalic and atraumatic.      Right Ear: External ear normal. There is impacted cerumen.      Left Ear: External ear normal. There is impacted cerumen.      Ears:      Comments: She has cerumen in the canals bilaterally, more so on the right than the left. The right TM is completely occluded and the left is partially occluded.  Neck:      Vascular: No carotid bruit.   Cardiovascular:      Rate and Rhythm: Normal rate and regular rhythm.      Pulses: Normal pulses.      Heart sounds: Normal heart sounds. No murmur heard.  No friction rub. No gallop.    Pulmonary:      Effort: Pulmonary effort is normal. No respiratory distress.      Breath sounds: Normal breath sounds. No stridor. No wheezing, rhonchi or rales.   Musculoskeletal:      Right lower leg: No edema.      Left lower leg: No edema.   Skin:     General: Skin is warm and dry.      Coloration: Skin is not jaundiced or pale.      Findings: No bruising, erythema, lesion or rash.   Neurological:      Mental Status: She is alert and oriented to person, place, and time.   Psychiatric:         Mood and Affect: Mood normal.         Behavior: Behavior normal.         Thought Content: Thought content normal.         Judgment: Judgment normal.        Result Review :     Common labs    Common Labsle 6/22/21 6/22/21 7/16/21 7/16/21 8/16/21    0950 0950 1041 1041    Glucose 293 (A)  138 (A)     BUN 38 (A)  33 " (A)     Creatinine 1.52 (A)  1.56 (A)     eGFR Non  Am 34 (A)  33 (A)     Sodium 135 (A)  131 (A)     Potassium 4.8  4.8     Chloride 101  96 (A)     Calcium 9.7  9.6     Albumin 4.00  4.10     Total Bilirubin 0.6  0.5     Alkaline Phosphatase 53  57     AST (SGOT) 27  20     ALT (SGPT) 21  15     Hemoglobin     10.9 (A)   Hematocrit     33.0 (A)   Hemoglobin A1C  10.10 (A)  10.49 (A)    (A) Abnormal value            CMP    CMP 6/11/21 6/22/21 7/16/21   Glucose 220 (A) 293 (A) 138 (A)   BUN 42 (A) 38 (A) 33 (A)   Creatinine 1.52 (A) 1.52 (A) 1.56 (A)   eGFR Non  Am 34 (A) 34 (A) 33 (A)   Sodium 135 (A) 135 (A) 131 (A)   Potassium 4.8 4.8 4.8   Chloride 102 101 96 (A)   Calcium 9.8 9.7 9.6   Albumin 4.10 4.00 4.10   Total Bilirubin 0.5 0.6 0.5   Alkaline Phosphatase 54 53 57   AST (SGOT) 28 27 20   ALT (SGPT) 20 21 15   (A) Abnormal value            CBC    CBC 6/16/21 8/16/21   Hemoglobin 12.4 (A) 10.9 (A)   Hematocrit 38.4 33.0 (A)   (A) Abnormal value            CBC w/diff    CBC w/Diff 6/16/21 8/16/21   Hemoglobin 12.4 (A) 10.9 (A)   Hematocrit 38.4 33.0 (A)   (A) Abnormal value            Lipid Panel    Lipid Panel 6/11/21   Total Cholesterol 167   Triglycerides 89   HDL Cholesterol 63 (A)   VLDL Cholesterol 16   LDL Cholesterol  88   LDL/HDL Ratio 1.37   (A) Abnormal value                          Assessment and Plan    Diagnoses and all orders for this visit:    1. Medicare annual wellness visit, subsequent (Primary)    2. Need for vaccination  -     Pneumococcal Polysaccharide Vaccine 23-Valent Greater Than or Equal To 3yo Subcutaneous / IM    3. Need for pneumococcal vaccine    4. Essential hypertension    5. Mixed hyperlipidemia    6. Elevated BUN    7. Elevated serum creatinine    8. Type 2 diabetes mellitus with hyperglycemia, without long-term current use of insulin (HCC)    9. Osteopenia, unspecified location    Other orders  -     denosumab (PROLIA) 60 MG/ML solution prefilled syringe  syringe; Inject 1 mL under the skin into the appropriate area as directed 1 (One) Time for 1 dose.  Dispense: 180 mL; Refill: 1      Medicare will not as completed. Immunizations updated. Health maintenance updated. She is given Pneumovax and flu shot in the office today and she tolerated this well.     It is very difficult to decide what medication she is taking and what she is not, but she needs a much better control of her diabetes and this is discussed with her today. She verbalizes understanding and is in agreement to this; however, she says that she can not get to [facility] or [place] to see any endocrinology to see an endocrinologist due to lack of transportation.     She is asked to come back in on Monday and bring all of her medications with her so that I can decipher for what medicines she is on for diabetes, what medicines she is not taking for diabetes, and adjust her medicine accordingly. She is doing as best she can with following the diabetic diet. No refills are needed today.    Continue to see nephrology, cardiology.     She is advised to use Debrox over-the-counter or sweet oil and if her complaints persist when she returns next week, we can consider irrigating her ears bilaterally if needed.     All questions and concerns are addressed with understanding noted. The patient is in agreement to above plan.    I spent 44 minutes caring for Odessa on this date of service. This time includes time spent by me in the following activities:preparing for the visit, reviewing tests, performing a medically appropriate examination and/or evaluation , counseling and educating the patient/family/caregiver, ordering medications, tests, or procedures and documenting information in the medical record  Follow Up   Return in about 1 week (around 11/22/2021), or if symptoms worsen or fail to improve, for Recheck, or sooner as needed.  Patient was given instructions and counseling regarding her condition or for  health maintenance advice. Please see specific information pulled into the AVS if appropriate.     Transcribed from ambient dictation for SIDDHARTH Jim by Danielle Finnegan.  11/15/21   17:38 CST    Patient verbalized consent to the visit recording.  I have personally performed the services described in this document as transcribed by the above individual, and it is both accurate and complete.  SIDDHARTH Jim  11/16/2021  09:18 CST

## 2021-11-15 NOTE — PROGRESS NOTES
The ABCs of the Annual Wellness Visit  Subsequent Medicare Wellness Visit    Chief Complaint   Patient presents with   • Medicare Wellness-subsequent   • Immunizations   • Ear Problem     Feels like her right ear is getting stopped up      Subjective    History of Present Illness:  Odessa Walker is a 66 y.o. female who presents for a Subsequent Medicare Wellness Visit.    The following portions of the patient's history were reviewed and   updated as appropriate: allergies, current medications, past family history, past medical history, past social history, past surgical history and problem list.    Compared to one year ago, the patient feels her physical   health is better.    Compared to one year ago, the patient feels her mental   health is better.    Recent Hospitalizations:  She was not admitted to the hospital during the last year.       Current Medical Providers:  Patient Care Team:  Kendal Mccoy APRN as PCP - Ge Caban DPM as Consulting Physician (Podiatry)    Outpatient Medications Prior to Visit   Medication Sig Dispense Refill   • aspirin 81 MG chewable tablet Chew 81 mg Daily.     • Calcium Carbonate (CALCIUM 600 PO) Take 1 tablet by mouth Daily.     • Flax Oil-Fish Oil-Borage Oil (FISH OIL-FLAX OIL-BORAGE OIL PO) Take 1 tablet by mouth 2 (Two) Times a Day.     • fluticasone (FLONASE) 50 MCG/ACT nasal spray USE 2 SPRAYS IN EACH NOSTRIL DAILY 16 g 3   • Glucosamine HCl (GLUCOSAMINE PO) Take 1,000 mg by mouth.     • glucose monitor monitoring kit 1 each Daily. DX:E11.9 1 each 0   • glucose monitor monitoring kit 1 each Daily. DX E11.9 1 each 0   • hydrOXYzine (ATARAX) 25 MG tablet Take 1-2 po qhs prn sleep 60 tablet 0   • Insulin Pen Needle (Easy Touch Pen Needles) 31G X 8 MM misc USE AS INSTRUCTED WITH VICTOZA 100 each 5   • IRON PO Take 65 mg by mouth Daily.     • Lancets misc Once daily 100 each 12   • Liraglutide (Victoza) 18 MG/3ML solution pen-injector injection Inject 1.2 mg  under the skin into the appropriate area as directed Daily.     • lisinopril (PRINIVIL,ZESTRIL) 40 MG tablet TAKE 1/2 TABLET BY MOUTH DAILY 15 tablet 5   • magnesium oxide (MAG-OX) 400 MG tablet Take 1 tablet by mouth 2 (Two) Times a Day.     • magnesium oxide (MAGOX) 400 (241.3 Mg) MG tablet tablet Take 400 mg by mouth 2 (Two) Times a Day.     • Multiple Vitamins-Minerals (ONE-A-DAY 50 PLUS PO) Take 1 tablet by mouth Daily.     • ONE TOUCH LANCETS misc 1 applicator Daily. 200 each 12   • ranolazine (RANEXA) 500 MG 12 hr tablet TAKE 1 TABLET BY MOUTH 2 (TWO) TIMES A DAY. 60 tablet 5   • rosuvastatin (CRESTOR) 10 MG tablet TAKE 1 TABLET BY MOUTH DAILY. **MD GUZMAN DRUG INTERACTION WITH RANEXA**10/27/20 HC 30 tablet 5   • sertraline (ZOLOFT) 50 MG tablet TAKE 1 TABLET BY MOUTH DAILY 60 tablet 5   • sulindac (CLINORIL) 200 MG tablet Take 200 mg by mouth 2 (Two) Times a Day.     • Tradjenta 5 MG tablet tablet TAKE 1 TABLET BY MOUTH DAILY. 30 tablet 2   • True Metrix Blood Glucose Test test strip 1 EACH BY OTHER ROUTE DAILY. USE TO TEST BLOOD SUGAR ONCE DAILY 100 each 5   • glipizide (GLUCOTROL) 10 MG tablet Take 1 tablet by mouth 2 (Two) Times a Day Before Meals.     • risedronate (Actonel) 35 MG tablet Take 1 tablet by mouth Every 7 (Seven) Days. with water on empty stomach, nothing by mouth or lie down for next 30 minutes. 4 tablet 11   • Insulin Glargine (BASAGLAR KWIKPEN) 100 UNIT/ML injection pen Inject 10 Units under the skin into the appropriate area as directed Every Night for 30 days. 3 mL 2     No facility-administered medications prior to visit.       No opioid medication identified on active medication list. I have reviewed chart for other potential  high risk medication/s and harmful drug interactions in the elderly.          Aspirin is on active medication list. Aspirin use is indicated based on review of current medical condition/s. Pros and cons of this therapy have been discussed today. Benefits of this  "medication outweigh potential harm.  Patient has been encouraged to continue taking this medication.  .      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  ACP discussion was held with the patient during this visit. Patient has an advance directive (not in EMR), copy requested.          Objective    Vitals:    11/15/21 1332   BP: 102/60   Pulse: 82   SpO2: 98%   Weight: 56.5 kg (124 lb 9.6 oz)   Height: 165.1 cm (65\")   PainSc:   4   PainLoc: Knee     BMI Readings from Last 1 Encounters:   11/15/21 20.73 kg/m²   BMI is within normal parameters. No follow-up required.    Does the patient have evidence of cognitive impairment? No    Physical Exam            HEALTH RISK ASSESSMENT    Smoking Status:  Social History     Tobacco Use   Smoking Status Former Smoker   • Packs/day: 0.50   • Years: 8.00   • Pack years: 4.00   • Types: Cigarettes   Smokeless Tobacco Never Used   Tobacco Comment    quit smoking >20 yr ago     Alcohol Consumption:  Social History     Substance and Sexual Activity   Alcohol Use No     Fall Risk Screen:    STEADI Fall Risk Assessment was completed, and patient is at HIGH risk for falls. Assessment completed on:11/15/2021    Depression Screening:  PHQ-2/PHQ-9 Depression Screening 11/15/2021   Little interest or pleasure in doing things 0   Feeling down, depressed, or hopeless 0   Total Score 0       Health Habits and Functional and Cognitive " Screening:  Functional & Cognitive Status 11/15/2021   Do you have difficulty preparing food and eating? No   Do you have difficulty bathing yourself, getting dressed or grooming yourself? No   Do you have difficulty using the toilet? No   Do you have difficulty moving around from place to place? No   Do you have trouble with steps or getting out of a bed or a chair? No   Current Diet Well Balanced Diet   Dental Exam Not up to date   Eye Exam Not up to date   Exercise (times per week) 2 times per week   Current Exercises Include Other   Current Exercise Activities Include -   Do you need help using the phone?  No   Are you deaf or do you have serious difficulty hearing?  Yes   Do you need help with transportation? No   Do you need help shopping? No   Do you need help preparing meals?  No   Do you need help with housework?  No   Do you need help with laundry? -   Do you need help taking your medications? No   Do you need help managing money? No   Do you ever drive or ride in a car without wearing a seat belt? No   Have you felt unusual stress, anger or loneliness in the last month? -   Who do you live with? -   If you need help, do you have trouble finding someone available to you? -   Have you been bothered in the last four weeks by sexual problems? -   Do you have difficulty concentrating, remembering or making decisions? No       Age-appropriate Screening Schedule:  Refer to the list below for future screening recommendations based on patient's age, sex and/or medical conditions. Orders for these recommended tests are listed in the plan section. The patient has been provided with a written plan.    Health Maintenance   Topic Date Due   • PAP SMEAR  Never done   • ZOSTER VACCINE (3 of 3) 05/17/2017   • DIABETIC EYE EXAM  12/13/2017   • DIABETIC FOOT EXAM  03/20/2018   • HEMOGLOBIN A1C  01/16/2022   • LIPID PANEL  06/11/2022   • URINE MICROALBUMIN  06/11/2022   • MAMMOGRAM  03/04/2023   • DXA SCAN  08/04/2023   •  TDAP/TD VACCINES (3 - Td or Tdap) 05/31/2026   • INFLUENZA VACCINE  Completed              Assessment/Plan   CMS Preventative Services Quick Reference  Risk Factors Identified During Encounter  Cardiovascular Disease  Chronic Pain   Dementia/Memory   Depression/Dysphoria  Fall Risk-High or Moderate  Glaucoma or Family History of Glaucoma  Hearing Problem  Immunizations Discussed/Encouraged (specific Immunizations; Influenza, Pneumococcal 23, Shingrix and COVID19  Inadequate Social Support, Isolation, Loneliness, Lack of Transportation, Financial Difficulties, or Caregiver Stress   Polypharmacy  The above risks/problems have been discussed with the patient.  Follow up actions/plans if indicated are seen below in the Assessment/Plan Section.  Pertinent information has been shared with the patient in the After Visit Summary.    Diagnoses and all orders for this visit:    1. Medicare annual wellness visit, subsequent (Primary)    2. Need for vaccination  -     Pneumococcal Polysaccharide Vaccine 23-Valent Greater Than or Equal To 1yo Subcutaneous / IM    3. Need for pneumococcal vaccine    4. Essential hypertension    5. Mixed hyperlipidemia    6. Elevated BUN    7. Elevated serum creatinine    8. Type 2 diabetes mellitus with hyperglycemia, without long-term current use of insulin (HCC)    9. Osteopenia, unspecified location    Other orders  -     denosumab (PROLIA) 60 MG/ML solution prefilled syringe syringe; Inject 1 mL under the skin into the appropriate area as directed 1 (One) Time for 1 dose.  Dispense: 180 mL; Refill: 1        Follow Up:   Return in about 1 week (around 11/22/2021), or if symptoms worsen or fail to improve, for Recheck, or sooner as needed.     An After Visit Summary and PPPS were made available to the patient.        I spent 44 minutes caring for Odessa on this date of service. This time includes time spent by me in the following activities:preparing for the visit, reviewing tests, performing a  medically appropriate examination and/or evaluation , counseling and educating the patient/family/caregiver, ordering medications, tests, or procedures and documenting information in the medical record

## 2021-11-18 LAB
QT INTERVAL: 396 MS
QTC INTERVAL: 442 MS

## 2021-11-22 ENCOUNTER — OFFICE VISIT (OUTPATIENT)
Dept: FAMILY MEDICINE CLINIC | Facility: CLINIC | Age: 66
End: 2021-11-22

## 2021-11-22 VITALS
HEART RATE: 73 BPM | OXYGEN SATURATION: 100 % | HEIGHT: 65 IN | SYSTOLIC BLOOD PRESSURE: 104 MMHG | BODY MASS INDEX: 20.66 KG/M2 | WEIGHT: 124 LBS | DIASTOLIC BLOOD PRESSURE: 60 MMHG

## 2021-11-22 DIAGNOSIS — E11.65 TYPE 2 DIABETES MELLITUS WITH HYPERGLYCEMIA, WITHOUT LONG-TERM CURRENT USE OF INSULIN (HCC): Primary | Chronic | ICD-10-CM

## 2021-11-22 PROCEDURE — 99214 OFFICE O/P EST MOD 30 MIN: CPT | Performed by: NURSE PRACTITIONER

## 2021-11-22 NOTE — PROGRESS NOTES
"Chief Complaint  Hypertension (1 week follow up)    Subjective          The patient presents today for a recheck on diabetes mellitus. She is accompanied by her sister today. I saw her last week; however, she did not bring her medication in with her and I was unsure what medicine she is or is not taking for diabetes. She needs much better control of her diabetes. Her last A1c was 10.4 percent in 07/2021 and on labs drawn on 11/15/2021, her BUN and creatinine were elevated, and her glucose was elevated as well at 339 mg/dL.    The patient reports that she takes one hemp tab for her joints, as well as using hemp oil topically. She just started using this. She states that she discontinued taking nsaids secondary to her kidney function being elevated. The patient reports that her a blood glucose level reading was 178 mg/dL this morning; however her blood glucose ranging from 200 mg/dL to 300 mg/dL in the evening. She adds she has had blood glucose readings of 400 if she ate foods she was not supposed to. The patient notes blood sugars of 129 or 150 on two separate occasions; she achieved this by being more mindful of her food choices. She takes Basaglar for her diabetes mellitus. The patient notes she has taken Trulicity, Amaryl, and metformin in the past; however, she states she was taken off of Trulicity and started on Victoza. She discontinued Amaryl and metformin secondary to her decreased kidney function. The patient denies continuing Victoza as it was not financially feasible for her. She denies trying Ozempic in the past.     Additionally, the patient that states that her sister and herself will be living together in the near future.    Odessa Walker presents to [facility]  History of Present Illness    Objective   Vital Signs:   /60   Pulse 73   Ht 165.1 cm (65\")   Wt 56.2 kg (124 lb)   SpO2 100%   BMI 20.63 kg/m²     Physical Exam  Vitals and " nursing note reviewed.   Constitutional:       General: She is not in acute distress.     Appearance: Normal appearance. She is normal weight. She is not ill-appearing, toxic-appearing or diaphoretic.   HENT:      Head: Normocephalic and atraumatic.   Cardiovascular:      Rate and Rhythm: Normal rate.   Pulmonary:      Effort: Pulmonary effort is normal. No respiratory distress.   Skin:     General: Skin is warm and dry.      Coloration: Skin is not jaundiced or pale.      Findings: No bruising, erythema, lesion or rash.   Neurological:      Mental Status: She is alert and oriented to person, place, and time.   Psychiatric:         Mood and Affect: Mood normal.         Behavior: Behavior normal.         Thought Content: Thought content normal.         Judgment: Judgment normal.        Result Review :{Labs  Result Review  Imaging  Med Tab  Media  Procedures :23}        CBC AND DIFFERENTIAL (11/15/2021 11:25 EST)  COMPREHENSIVE METABOLIC PANEL (11/15/2021 11:25 EST)              Assessment and Plan    Diagnoses and all orders for this visit:    1. Type 2 diabetes mellitus with hyperglycemia, without long-term current use of insulin (HCC) (Primary)  Comments:  uncontrolled  Orders:  -     Hemoglobin A1c; Future  -     Comprehensive metabolic panel; Future    Other orders  -     Semaglutide, 1 MG/DOSE, (OZEMPIC) 2 MG/1.5ML solution pen-injector; Inject 0.5 mg under the skin into the appropriate area as directed 1 (One) Time Per Week.  Dispense: 1.5 mL; Refill: 2      We have discussed better control of her diabetes at length today. She will stop Tradjenta and continue on Basaglar. Samples of Basaglar are given to her today. She is also going to start on Ozempic. Verbal, written, and demonstration information is given to her today. She will start Ozempic tomorrow at 0.25 mg weekly until she runs out of samples and then we will increase to 0.5 mg Ozempic and a prescription for .5 mg Ozempic is given to her today. She  will follow strict diabetic diet, monitor her weight, increase protein and vegetables, decrease carbs and sugars, increase water and exercise, and follow up in 1-month for recheck, sooner if needed. All questions and concerns are addressed with understanding noted. The patient and her sister are in agreement to above plan. We discussed referring her to endocrinology however pt declines due to inability to get to [place] or [place] to see them. May have to reconsider if her blood sugars do not improve.       I spent 33 minutes caring for Odessa on this date of service. This time includes time spent by me in the following activities:preparing for the visit, reviewing tests, performing a medically appropriate examination and/or evaluation , counseling and educating the patient/family/caregiver, ordering medications, tests, or procedures and documenting information in the medical record  Follow Up   Return in about 4 weeks (around 12/20/2021), or if symptoms worsen or fail to improve, for Recheck, or sooner as needed.  Patient was given instructions and counseling regarding her condition or for health maintenance advice. Please see specific information pulled into the AVS if appropriate.     Transcribed from ambient dictation for SIDDHARTH Jim by Danielle Finnegan.  11/22/21   17:33 CST    Patient verbalized consent to the visit recording.  I have personally performed the services described in this document as transcribed by the above individual, and it is both accurate and complete.  SIDDHARTH Jim  11/23/2021  08:15 CST

## 2021-12-08 ENCOUNTER — LAB (OUTPATIENT)
Dept: LAB | Facility: OTHER | Age: 66
End: 2021-12-08

## 2021-12-08 DIAGNOSIS — E11.65 TYPE 2 DIABETES MELLITUS WITH HYPERGLYCEMIA, WITHOUT LONG-TERM CURRENT USE OF INSULIN (HCC): Chronic | ICD-10-CM

## 2021-12-08 LAB
ALBUMIN SERPL-MCNC: 4.1 G/DL (ref 3.5–5)
ALBUMIN/GLOB SERPL: 1.3 G/DL (ref 1.1–1.8)
ALP SERPL-CCNC: 70 U/L (ref 38–126)
ALT SERPL W P-5'-P-CCNC: 21 U/L
ANION GAP SERPL CALCULATED.3IONS-SCNC: 5 MMOL/L (ref 5–15)
AST SERPL-CCNC: 27 U/L (ref 14–36)
BILIRUB SERPL-MCNC: 0.5 MG/DL (ref 0.2–1.3)
BUN SERPL-MCNC: 31 MG/DL (ref 7–23)
BUN/CREAT SERPL: 24.4 (ref 7–25)
CALCIUM SPEC-SCNC: 9.9 MG/DL (ref 8.4–10.2)
CHLORIDE SERPL-SCNC: 99 MMOL/L (ref 101–112)
CO2 SERPL-SCNC: 30 MMOL/L (ref 22–30)
CREAT SERPL-MCNC: 1.27 MG/DL (ref 0.52–1.04)
GFR SERPL CREATININE-BSD FRML MDRD: 42 ML/MIN/1.73 (ref 45–104)
GLOBULIN UR ELPH-MCNC: 3.1 GM/DL (ref 2.3–3.5)
GLUCOSE SERPL-MCNC: 230 MG/DL (ref 70–99)
POTASSIUM SERPL-SCNC: 4.6 MMOL/L (ref 3.4–5)
PROT SERPL-MCNC: 7.2 G/DL (ref 6.3–8.6)
SODIUM SERPL-SCNC: 134 MMOL/L (ref 137–145)

## 2021-12-08 PROCEDURE — 83036 HEMOGLOBIN GLYCOSYLATED A1C: CPT | Performed by: NURSE PRACTITIONER

## 2021-12-08 PROCEDURE — 80053 COMPREHEN METABOLIC PANEL: CPT | Performed by: NURSE PRACTITIONER

## 2021-12-08 PROCEDURE — 36415 COLL VENOUS BLD VENIPUNCTURE: CPT | Performed by: NURSE PRACTITIONER

## 2021-12-09 DIAGNOSIS — E11.65 TYPE 2 DIABETES MELLITUS WITH HYPERGLYCEMIA, UNSPECIFIED WHETHER LONG TERM INSULIN USE (HCC): Chronic | ICD-10-CM

## 2021-12-09 LAB — HBA1C MFR BLD: 13.2 % (ref 4.8–5.6)

## 2021-12-21 ENCOUNTER — OFFICE VISIT (OUTPATIENT)
Dept: FAMILY MEDICINE CLINIC | Facility: CLINIC | Age: 66
End: 2021-12-21

## 2021-12-21 VITALS
TEMPERATURE: 97.3 F | SYSTOLIC BLOOD PRESSURE: 110 MMHG | OXYGEN SATURATION: 96 % | WEIGHT: 123 LBS | HEIGHT: 65 IN | BODY MASS INDEX: 20.49 KG/M2 | DIASTOLIC BLOOD PRESSURE: 70 MMHG | HEART RATE: 93 BPM

## 2021-12-21 DIAGNOSIS — I10 ESSENTIAL HYPERTENSION: Chronic | ICD-10-CM

## 2021-12-21 DIAGNOSIS — Z79.4 TYPE 2 DIABETES MELLITUS WITH HYPERGLYCEMIA, WITH LONG-TERM CURRENT USE OF INSULIN (HCC): Primary | Chronic | ICD-10-CM

## 2021-12-21 DIAGNOSIS — I20.8 CHRONIC STABLE ANGINA (HCC): ICD-10-CM

## 2021-12-21 DIAGNOSIS — E78.2 MIXED HYPERLIPIDEMIA: Chronic | ICD-10-CM

## 2021-12-21 DIAGNOSIS — H61.21 IMPACTED CERUMEN, RIGHT EAR: ICD-10-CM

## 2021-12-21 DIAGNOSIS — G89.29 CHRONIC PAIN OF LEFT KNEE: ICD-10-CM

## 2021-12-21 DIAGNOSIS — N18.30 STAGE 3 CHRONIC KIDNEY DISEASE, UNSPECIFIED WHETHER STAGE 3A OR 3B CKD (HCC): ICD-10-CM

## 2021-12-21 DIAGNOSIS — E11.65 TYPE 2 DIABETES MELLITUS WITH HYPERGLYCEMIA, WITH LONG-TERM CURRENT USE OF INSULIN (HCC): Primary | Chronic | ICD-10-CM

## 2021-12-21 DIAGNOSIS — M25.562 CHRONIC PAIN OF LEFT KNEE: ICD-10-CM

## 2021-12-21 LAB — GLUCOSE BLDC GLUCOMTR-MCNC: 349 MG/DL (ref 70–130)

## 2021-12-21 PROCEDURE — 82962 GLUCOSE BLOOD TEST: CPT | Performed by: NURSE PRACTITIONER

## 2021-12-21 PROCEDURE — 99214 OFFICE O/P EST MOD 30 MIN: CPT | Performed by: NURSE PRACTITIONER

## 2021-12-21 RX ORDER — LINAGLIPTIN 5 MG/1
5 TABLET, FILM COATED ORAL DAILY
COMMUNITY
Start: 2021-11-29 | End: 2021-12-28

## 2021-12-21 RX ORDER — MAGNESIUM OXIDE 400 MG/1
400 TABLET ORAL
COMMUNITY
Start: 2021-12-20 | End: 2022-12-21

## 2021-12-21 RX ORDER — INSULIN DEGLUDEC INJECTION 100 U/ML
10 INJECTION, SOLUTION SUBCUTANEOUS DAILY
Qty: 3 ML | Refills: 1 | Status: SHIPPED | OUTPATIENT
Start: 2021-12-21 | End: 2022-04-25

## 2021-12-21 NOTE — PROGRESS NOTES
"Chief Complaint  Hypertension and Diabetes    Subjective          Odessa Walker presents to [facility] [place]  History of Present Illness  Patient here today with sister for recheck of diabetes.  She states that since she started taking Ozempic 1 mg along with Basaglar her blood sugars have still been elevated but better than they were.  She reports that they are running in the 200s.  As low as 160 when she skipped supper the night before.  She is currently taking Basaglar 15 units and has been on the higher dose of Ozempic for about 3 weeks.  Reports that she ate breakfast around 10 AM this morning.  At times complains of her head and hands shaking which started last month.  She states before starting on the higher dose of the Ozempic blood sugars were running 2 60-2 70 now they are in the low 200s.  Still complaining of left knee pain which is worsening.  Still complaining of some decreased hearing out of her right ear.  States that she did use Debrox ear irrigation kit from [facility] [employer] which helped but did not resolve her complaints.    Objective   Vital Signs:   /70 (BP Location: Left arm, Patient Position: Sitting, Cuff Size: Adult)   Pulse 93   Temp 97.3 °F (36.3 °C) (Tympanic)   Ht 165.1 cm (65\")   Wt 55.8 kg (123 lb)   SpO2 96%   BMI 20.47 kg/m²     Physical Exam  Vitals and nursing note reviewed.   Constitutional:       General: She is not in acute distress.     Appearance: Normal appearance. She is normal weight. She is not ill-appearing, toxic-appearing or diaphoretic.   HENT:      Head: Normocephalic and atraumatic.   Neck:      Vascular: No carotid bruit.   Cardiovascular:      Rate and Rhythm: Normal rate and regular rhythm.      Heart sounds: Normal heart sounds. No murmur heard.  No friction rub. No gallop.    Pulmonary:      Effort: Pulmonary effort is normal. No respiratory distress.      Breath sounds: Normal breath sounds. No stridor. No " wheezing, rhonchi or rales.   Musculoskeletal:         General: Tenderness present.      Left knee: Bony tenderness present. Decreased range of motion. Tenderness present.      Right lower leg: No edema.      Left lower leg: No edema.      Comments: She is ambulatory with assistance of cane.  Complaining of decreased range of motion and pain to left knee.  Previous x-rays are reviewed which did reveal severe osteoarthritic changes over 1 year ago   Skin:     General: Skin is warm and dry.      Coloration: Skin is not jaundiced or pale.      Findings: No bruising, erythema, lesion or rash.   Neurological:      Mental Status: She is alert and oriented to person, place, and time.   Psychiatric:         Mood and Affect: Mood normal.         Behavior: Behavior normal.         Thought Content: Thought content normal.         Judgment: Judgment normal.        Result Review :     Common labs    Common Labsle 7/16/21 7/16/21 8/16/21 12/8/21 12/8/21    1041 1041  0941 0959   Glucose 138 (A)   230 (A)    BUN 33 (A)   31 (A)    Creatinine 1.56 (A)   1.27 (A)    eGFR Non  Am 33 (A)   42 (A)    Sodium 131 (A)   134 (A)    Potassium 4.8   4.6    Chloride 96 (A)   99 (A)    Calcium 9.6   9.9    Albumin 4.10   4.10    Total Bilirubin 0.5   0.5    Alkaline Phosphatase 57   70    AST (SGOT) 20   27    ALT (SGPT) 15   21    Hemoglobin   10.9 (A)     Hematocrit   33.0 (A)     Hemoglobin A1C  10.49 (A)   13.20 (A)   (A) Abnormal value            CMP    CMP 6/22/21 7/16/21 12/8/21   Glucose 293 (A) 138 (A) 230 (A)   BUN 38 (A) 33 (A) 31 (A)   Creatinine 1.52 (A) 1.56 (A) 1.27 (A)   eGFR Non  Am 34 (A) 33 (A) 42 (A)   Sodium 135 (A) 131 (A) 134 (A)   Potassium 4.8 4.8 4.6   Chloride 101 96 (A) 99 (A)   Calcium 9.7 9.6 9.9   Albumin 4.00 4.10 4.10   Total Bilirubin 0.6 0.5 0.5   Alkaline Phosphatase 53 57 70   AST (SGOT) 27 20 27   ALT (SGPT) 21 15 21   (A) Abnormal value            CBC    CBC 6/16/21 8/16/21   Hemoglobin 12.4  (A) 10.9 (A)   Hematocrit 38.4 33.0 (A)   (A) Abnormal value            CBC w/diff    CBC w/Diff 6/16/21 8/16/21   Hemoglobin 12.4 (A) 10.9 (A)   Hematocrit 38.4 33.0 (A)   (A) Abnormal value            Lipid Panel    Lipid Panel 6/11/21   Total Cholesterol 167   Triglycerides 89   HDL Cholesterol 63 (A)   VLDL Cholesterol 16   LDL Cholesterol  88   LDL/HDL Ratio 1.37   (A) Abnormal value                A1C Last 3 Results    HGBA1C Last 3 Results 6/22/21 7/16/21 12/8/21   Hemoglobin A1C 10.10 (A) 10.49 (A) 13.20 (A)   (A) Abnormal value            Data reviewed: Radiologic studies X-rays from October 2020 of left knee reviewed today          Assessment and Plan    Diagnoses and all orders for this visit:    1. Type 2 diabetes mellitus with hyperglycemia, with long-term current use of insulin (HCC) (Primary)  Comments:  uncontrolled, fsbs in office 349, approx 2-3 hr ppd  Orders:  -     POCT Glucose  -     insulin degludec (Tresiba FlexTouch) 100 UNIT/ML solution pen-injector injection; Inject 10 Units under the skin into the appropriate area as directed Daily for 31 days.  Dispense: 3 mL; Refill: 1    2. Mixed hyperlipidemia    3. Essential hypertension    4. Chronic pain of left knee  -     XR Knee 1 or 2 View Left  -     Ambulatory Referral to Orthopedic Surgery    5. Impacted cerumen, right ear    6. Chronic stable angina (HCC)    7. Stage 3 chronic kidney disease, unspecified whether stage 3a or 3b CKD ([facility])    Labs are reviewed with patient and sister today.  Fingerstick blood sugar in office is 349.  X-ray of left knee obtained and she will be informed of results via phone.  Will refer her onto orthopedic surgery for further evaluation however patient is advised that he will likely not consider any surgery until her blood sugars well controlled or at least greatly improved.  She is encouraged to continue following a diabetic diet we will continue with Ozempic 1 mg weekly and will switch from Basaglar to  Tresiba as above.  She will follow up in 1 week for recheck sooner if needed.  May need to increase dose or add another agent if blood sugars are not improving at that time.  They are aware and in agreement to above plan.  All questions and concerns addressed with understanding verbalized.    I spent 44 minutes caring for Odessa on this date of service. This time includes time spent by me in the following activities:preparing for the visit, reviewing tests, performing a medically appropriate examination and/or evaluation , counseling and educating the patient/family/caregiver, ordering medications, tests, or procedures, referring and communicating with other health care professionals  and documenting information in the medical record  Follow Up   Return in about 1 week (around 12/28/2021), or if symptoms worsen or fail to improve, for Recheck, or sooner as needed.  Patient was given instructions and counseling regarding her condition or for health maintenance advice. Please see specific information pulled into the AVS if appropriate.

## 2021-12-28 ENCOUNTER — OFFICE VISIT (OUTPATIENT)
Dept: FAMILY MEDICINE CLINIC | Facility: CLINIC | Age: 66
End: 2021-12-28

## 2021-12-28 VITALS
WEIGHT: 124.4 LBS | SYSTOLIC BLOOD PRESSURE: 120 MMHG | BODY MASS INDEX: 20.73 KG/M2 | DIASTOLIC BLOOD PRESSURE: 70 MMHG | OXYGEN SATURATION: 98 % | HEIGHT: 65 IN | HEART RATE: 70 BPM

## 2021-12-28 DIAGNOSIS — Z79.4 TYPE 2 DIABETES MELLITUS WITH HYPERGLYCEMIA, WITH LONG-TERM CURRENT USE OF INSULIN (HCC): Primary | Chronic | ICD-10-CM

## 2021-12-28 DIAGNOSIS — E11.65 TYPE 2 DIABETES MELLITUS WITH HYPERGLYCEMIA, WITH LONG-TERM CURRENT USE OF INSULIN (HCC): Primary | Chronic | ICD-10-CM

## 2021-12-28 PROCEDURE — 99213 OFFICE O/P EST LOW 20 MIN: CPT | Performed by: NURSE PRACTITIONER

## 2021-12-28 RX ORDER — LINAGLIPTIN 5 MG/1
TABLET, FILM COATED ORAL
Qty: 30 TABLET | Refills: 5 | Status: SHIPPED | OUTPATIENT
Start: 2021-12-28 | End: 2022-10-31 | Stop reason: SDUPTHER

## 2021-12-29 NOTE — PROGRESS NOTES
"Chief Complaint  Diabetes (1 week follow up)    Subjective          Odessa Walker presents to [facility] [place]  History of Present Illness     The patient presents today for follow-up evaluation of diabetes. She is accompanied by her sister today. She reports that her blood glucose levels have improved since her last visit; 77 today and 99 on 12/27/2021. The patient adds that she has been mindful of her diet. She is currently taking Tresiba, and Ozempic, 1 mg once weekly. The patient denies issues with her feet or vision. She reports that her energy level has improved.    The following portions of the patient's history were reviewed and updated as appropriate: allergies, current medications, past family history, past medical history, past social history, past surgical history and problem list.    Objective   Vital Signs:   /70   Pulse 70   Ht 165.1 cm (65\")   Wt 56.4 kg (124 lb 6.4 oz)   SpO2 98%   BMI 20.70 kg/m²     Physical Exam  Vitals and nursing note reviewed.   Constitutional:       General: She is not in acute distress.     Appearance: Normal appearance. She is normal weight. She is not ill-appearing, toxic-appearing or diaphoretic.   HENT:      Head: Normocephalic and atraumatic.   Cardiovascular:      Rate and Rhythm: Normal rate. Rhythm irregular.      Pulses: Normal pulses.      Heart sounds: Normal heart sounds. No murmur heard.  No friction rub. No gallop.    Pulmonary:      Effort: Pulmonary effort is normal. No respiratory distress.      Breath sounds: Normal breath sounds. No stridor. No wheezing, rhonchi or rales.   Abdominal:      Palpations: Abdomen is soft.      Tenderness: There is no abdominal tenderness.   Musculoskeletal:         General: Normal range of motion.      Cervical back: Normal range of motion and neck supple.   Skin:     General: Skin is warm and dry.      Capillary Refill: Capillary refill takes less than 2 seconds.    "   Coloration: Skin is not jaundiced or pale.      Findings: No bruising, erythema, lesion or rash.   Neurological:      Mental Status: She is alert and oriented to person, place, and time.   Psychiatric:         Mood and Affect: Mood normal.         Behavior: Behavior normal.         Thought Content: Thought content normal.         Judgment: Judgment normal.        Result Review :     Common labs    Common Labsle 7/16/21 7/16/21 8/16/21 12/8/21 12/8/21    1041 1041  0941 0959   Glucose 138 (A)   230 (A)    BUN 33 (A)   31 (A)    Creatinine 1.56 (A)   1.27 (A)    eGFR Non  Am 33 (A)   42 (A)    Sodium 131 (A)   134 (A)    Potassium 4.8   4.6    Chloride 96 (A)   99 (A)    Calcium 9.6   9.9    Albumin 4.10   4.10    Total Bilirubin 0.5   0.5    Alkaline Phosphatase 57   70    AST (SGOT) 20   27    ALT (SGPT) 15   21    Hemoglobin   10.9 (A)     Hematocrit   33.0 (A)     Hemoglobin A1C  10.49 (A)   13.20 (A)   (A) Abnormal value            CMP    CMP 6/22/21 7/16/21 12/8/21   Glucose 293 (A) 138 (A) 230 (A)   BUN 38 (A) 33 (A) 31 (A)   Creatinine 1.52 (A) 1.56 (A) 1.27 (A)   eGFR Non  Am 34 (A) 33 (A) 42 (A)   Sodium 135 (A) 131 (A) 134 (A)   Potassium 4.8 4.8 4.6   Chloride 101 96 (A) 99 (A)   Calcium 9.7 9.6 9.9   Albumin 4.00 4.10 4.10   Total Bilirubin 0.6 0.5 0.5   Alkaline Phosphatase 53 57 70   AST (SGOT) 27 20 27   ALT (SGPT) 21 15 21   (A) Abnormal value            CBC    CBC 6/16/21 8/16/21   Hemoglobin 12.4 (A) 10.9 (A)   Hematocrit 38.4 33.0 (A)   (A) Abnormal value            CBC w/diff    CBC w/Diff 6/16/21 8/16/21   Hemoglobin 12.4 (A) 10.9 (A)   Hematocrit 38.4 33.0 (A)   (A) Abnormal value            Lipid Panel    Lipid Panel 6/11/21   Total Cholesterol 167   Triglycerides 89   HDL Cholesterol 63 (A)   VLDL Cholesterol 16   LDL Cholesterol  88   LDL/HDL Ratio 1.37   (A) Abnormal value                      Assessment and Plan    Diagnoses and all orders for this visit:    1. Type 2  diabetes mellitus with hyperglycemia, with long-term current use of insulin (HCC) (Primary)  Comments:  improving      She will continue with her current medications. She will follow up in 1 month for recheck, sooner if needed, and continue to monitor blood glucose closely. We will need to obtain labs at next visit as well, including CMP and HbA1c. She is congratulated that her numbers are much improved from her past numbers and is encouraged to continue with diet and exercise program as well. All questions and concerns are addressed with understanding noted. The patient is in agreement to above plan.    I spent 22 minutes caring for Odessa on this date of service. This time includes time spent by me in the following activities:preparing for the visit, performing a medically appropriate examination and/or evaluation , counseling and educating the patient/family/caregiver and documenting information in the medical record  Follow Up   Return if symptoms worsen or fail to improve, for Recheck, or sooner as needed.  Patient was given instructions and counseling regarding her condition or for health maintenance advice. Please see specific information pulled into the AVS if appropriate.     Transcribed from ambient dictation for SIDDHARTH Jim by Linnea Sanderson.  12/29/21   08:19 CST    Patient verbalized consent to the visit recording.  I have personally performed the services described in this document as transcribed by the above individual, and it is both accurate and complete.  SIDDHARTH Jim  12/29/2021  16:32 CST

## 2022-01-12 ENCOUNTER — OFFICE VISIT (OUTPATIENT)
Dept: ORTHOPEDIC SURGERY | Facility: CLINIC | Age: 67
End: 2022-01-12

## 2022-01-12 ENCOUNTER — CLINICAL SUPPORT (OUTPATIENT)
Dept: FAMILY MEDICINE CLINIC | Facility: CLINIC | Age: 67
End: 2022-01-12

## 2022-01-12 VITALS — WEIGHT: 125 LBS | HEIGHT: 65 IN | BODY MASS INDEX: 20.83 KG/M2

## 2022-01-12 DIAGNOSIS — G89.29 CHRONIC PAIN OF LEFT KNEE: Primary | ICD-10-CM

## 2022-01-12 DIAGNOSIS — M81.0 AGE-RELATED OSTEOPOROSIS WITHOUT CURRENT PATHOLOGICAL FRACTURE: Primary | ICD-10-CM

## 2022-01-12 DIAGNOSIS — M25.562 CHRONIC PAIN OF LEFT KNEE: Primary | ICD-10-CM

## 2022-01-12 DIAGNOSIS — M17.12 PRIMARY OSTEOARTHRITIS OF LEFT KNEE: ICD-10-CM

## 2022-01-12 PROCEDURE — 96372 THER/PROPH/DIAG INJ SC/IM: CPT | Performed by: NURSE PRACTITIONER

## 2022-01-12 PROCEDURE — 20610 DRAIN/INJ JOINT/BURSA W/O US: CPT | Performed by: NURSE PRACTITIONER

## 2022-01-12 PROCEDURE — 99214 OFFICE O/P EST MOD 30 MIN: CPT | Performed by: NURSE PRACTITIONER

## 2022-01-12 RX ADMIN — LIDOCAINE HYDROCHLORIDE 2 ML: 10 INJECTION, SOLUTION EPIDURAL; INFILTRATION; INTRACAUDAL; PERINEURAL at 08:53

## 2022-01-12 RX ADMIN — TRIAMCINOLONE ACETONIDE 40 MG: 40 INJECTION, SUSPENSION INTRA-ARTICULAR; INTRAMUSCULAR at 08:53

## 2022-01-12 NOTE — PROGRESS NOTES
"Odessa Walker is a 66 y.o. female returns for     Chief Complaint   Patient presents with   • Left Knee - Follow-up, Pain       HISTORY OF PRESENT ILLNESS: Patient presents to office for follow-up of chronic left knee pain due to known and advanced osteoarthritic changes.  Patient has experienced ongoing and progressively worsening left knee pain for several years that has progressively worsened especially in the past 2 to 3 years.  The patient was last seen by orthopedics on 12/4/2020 per Dr. Davies.  Previous conservative care has included use of a cane for modified weightbearing and bracing.  Patient has previously underwent right knee replacement several years ago.  Patient has not had any intra-articular injections of steroid or viscosupplementation in the left knee previously.  Patient complains of aching and grinding pain in the left knee that worsens with weightbearing activity.  Patient complains of instability symptoms and intermittent buckling of the left knee.  Patient complains of joint stiffness and intermittent joint swelling of the left knee.  Patient had recent x-rays performed on 12/21/2021 as ordered by her PCP.  Pain scale today is 5/10.     CONCURRENT MEDICAL HISTORY:    The following portions of the patient's history were reviewed and updated as appropriate: allergies, current medications, past family history, past medical history, past social history, past surgical history and problem list.     ROS  No fevers or chills.  No chest pain or shortness of air.  No GI or  disturbances. Left knee pain.     PHYSICAL EXAMINATION:       Ht 165.1 cm (65\")   Wt 56.7 kg (125 lb)   BMI 20.80 kg/m²     Physical Exam  Vitals reviewed.   Constitutional:       General: She is not in acute distress.     Appearance: She is well-developed. She is not ill-appearing.   HENT:      Head: Normocephalic.   Pulmonary:      Effort: Pulmonary effort is normal. No respiratory distress.   Abdominal:      General: " There is no distension.      Palpations: Abdomen is soft.   Musculoskeletal:         General: Swelling (Mild, left knee) and tenderness (Left knee) present. No signs of injury.      Left knee: No effusion.   Skin:     General: Skin is warm and dry.      Capillary Refill: Capillary refill takes less than 2 seconds.      Findings: No erythema.   Neurological:      Mental Status: She is alert and oriented to person, place, and time.      GCS: GCS eye subscore is 4. GCS verbal subscore is 5. GCS motor subscore is 6.   Psychiatric:         Speech: Speech normal.         Behavior: Behavior normal.         Thought Content: Thought content normal.         Judgment: Judgment normal.         GAIT:     []  Normal  [x]  Antalgic    Assistive device: []  None  []  Walker     []  Crutches  [x]  Cane     []  Wheelchair  []  Stretcher    Left Knee Exam     Tenderness   The patient is experiencing tenderness in the lateral joint line (Mild, diffuse).    Range of Motion   Extension: 5   Flexion: 100     Tests   Varus: negative Valgus: negative    Other   Erythema: absent  Sensation: normal  Pulse: present  Swelling: mild  Effusion: no effusion present            XR Knee 1 or 2 View Left    Result Date: 12/22/2021  Narrative: EXAM: XR KNEE 1-2 VIEWS COMPARISON: Radiograph 10/27/2020 INDICATION: left knee pain, M25.562 Pain in left knee G89.29 Other chronic pain FINDINGS: Two-view left knee. No acute fracture or dislocation. No suspicious osseous lesion. Complete loss of the lateral compartment joint space. Large marginal osteophytes of the femoral condyles and tibial plateaus. Prominent patellar osteophytes. Curvilinear calcific density seen within the medial compartment. Probable small joint effusion.     Impression: No acute osseous abnormality. Moderate to severe tricompartmental osteoarthritis. Medial compartment chondrocalcinosis. Electronically signed by:  Konstantin Cook MD  12/22/2021 3:49 PM CST Workstation:  [id number]    Contains abnormal data Hemoglobin A1c  Order: [id number]   Status: Final result     Visible to patient: No (not released)     Next appt: 01/27/2022 at 01:30 PM in Family Medicine (Kendal Mccoy, SIDDHARTH)     Dx: Type 2 diabetes mellitus with hypergl...    Specimen Information: Blood         4 Result Notes     1 Follow-up Encounter     1  Topic    Component   Ref Range & Units 1 mo ago   (12/8/21) 6 mo ago   (7/16/21) 6 mo ago   (6/22/21) 7 mo ago   (6/11/21) 1 yr ago   (10/27/20) 1 yr ago   (6/15/20) 1 yr ago   (3/3/20)   Hemoglobin A1C   4.80 - 5.60 % 13.20 High   10.49 High   10.10 High   10.06 High   7.12 High   9.05 High   8.85 High     Resulting Agency  [facility]  [facility]  [facility]  [facility]  [facility]  [facility]             Narrative  Performed by:  [facility]  Hemoglobin A1C Ranges:     Increased Risk for Diabetes  5.7% to 6.4%   Diabetes                     >= 6.5%   Diabetic Goal                < 7.0%      Specimen Collected: 12/08/21 09:59 Last Resulted: 12/09/21 03:22             Large Joint Arthrocentesis: L knee  Date/Time: 1/12/2022 8:53 AM  Consent given by: patient  Timeout: Immediately prior to procedure a time out was called to verify the correct patient, procedure, equipment, support staff and site/side marked as required   Supporting Documentation  Indications: pain, joint swelling and diagnostic evaluation   Procedure Details  Location: knee - L knee  Preparation: Patient was prepped and draped in the usual sterile fashion  Needle size: 22 G  Approach: anterolateral  Medications administered: 40 mg triamcinolone acetonide 40 MG/ML; 2 mL lidocaine PF 1% 1 %  Patient tolerance: patient tolerated the procedure well with no immediate complications      ASSESSMENT:    Diagnoses and all orders for this visit:    Chronic pain of left knee  -     Large Joint Arthrocentesis: L knee    Primary osteoarthritis of left knee  -     Large Joint Arthrocentesis: L  knee    PLAN    X-rays of the left knee performed previously on 12/21/2021 are independently reviewed by myself today with no acute findings noted.  Patient has severe end-stage osteoarthritic changes in the left knee joint with complete collapse of the lateral compartment and valgus positioning/deformity.  Patient has experienced ongoing and progressively worsening left knee pain for several years.  She has been treated per Dr. Davies in the past but has not been seen by orthopedics in over 1 year.  She has not had any prior injections in the left knee.  We discussed continued conservative treatment efforts versus surgical consult for left total knee arthroplasty.  Patient does have a history of previous right knee replacement several years ago.  At this time, patient wants to continue with conservative care.  The patient would not be a surgical candidate for elective knee arthroplasty at this time due to her uncontrolled diabetes mellitus.    We discussed a trial of an intra-articular injection of steroid.  However, it is notable that the patient has uncontrolled diabetes mellitus-type II.  I have reviewed her most recent labs from 12/8/2021 and she is noted to have had a hemoglobin A1c of 13.2 at that time.  The patient states that her PCP has made changes to her diabetic medications and that her blood sugar has been much better recently.  We discussed the risk of worsening hyperglycemia with use of steroids, although we did discuss that it is a small dose and it is primarily localized in the joint space but it can still be absorbed systemically and increase her blood sugar readings.  We discussed that if it does increase her blood sugar, it should be transient.  The patient wants to proceed with the injection and she is encouraged/instructed to monitor her blood sugar closely, follow her diabetic diet and take her diabetic medications as prescribed.  Recommend proceeding today with an intra-articular  injection of steroid to the left knee for management of joint pain/inflammation/swelling.    Recommend the following:    -Rest and activity modification as tolerated and based on pain.  -Modified weightbearing of the affected extremity with use of a cane or walker as needed.  -Gradual progression of weightbearing and activity as pain and swelling allow.  -Conditioning and strengthening exercises of the bilateral knees/legs.  -Elevation and ice therapy to the affected knee to minimize pain/swelling/inflammation.   -Tylenol as needed for pain/discomfort.  Patient cannot take oral NSAIDs due to her history of chronic kidney disease.    Follow-up in 6 weeks for recheck as needed for any new, worsening or persistent symptoms.  We discussed that if she gets good pain improvement with the injection, she can follow-up on an as-needed basis.  Consider viscosupplementation as a potential treatment option for her osteoarthritic pain.  Consider physical therapy to help with conditioning and strengthening exercises of her left knee/leg.     Time spent of a minimum of 30 minutes including the face to face evaluation, reviewing of medical history and prior medial records, reviewing of diagnostic studies, documentation, patient education and coordination of care.     EMR Dragon/Transciption Disclaimer: Some of this note may be an electronic transcription/translation of spoken language to printed text.  The electronic translation of spoken language may permit erroneous, or at times, nonsensical words or phrases to be inadvertently transcribed. Although I have reviewed the note for such errors, some may still exist.     Return in about 6 weeks (around 2/23/2022), or if symptoms worsen or fail to improve, for Recheck.        This document has been electronically signed by SIDDHARTH Alamo on January 16, 2022 16:02 CST      SIDDHARTH Alamo

## 2022-01-16 RX ORDER — LIDOCAINE HYDROCHLORIDE 10 MG/ML
2 INJECTION, SOLUTION EPIDURAL; INFILTRATION; INTRACAUDAL; PERINEURAL
Status: COMPLETED | OUTPATIENT
Start: 2022-01-12 | End: 2022-01-12

## 2022-01-16 RX ORDER — TRIAMCINOLONE ACETONIDE 40 MG/ML
40 INJECTION, SUSPENSION INTRA-ARTICULAR; INTRAMUSCULAR
Status: COMPLETED | OUTPATIENT
Start: 2022-01-12 | End: 2022-01-12

## 2022-01-25 ENCOUNTER — LAB (OUTPATIENT)
Dept: LAB | Facility: OTHER | Age: 67
End: 2022-01-25

## 2022-01-25 DIAGNOSIS — E11.65 TYPE 2 DIABETES MELLITUS WITH HYPERGLYCEMIA, WITH LONG-TERM CURRENT USE OF INSULIN: ICD-10-CM

## 2022-01-25 DIAGNOSIS — Z79.4 TYPE 2 DIABETES MELLITUS WITH HYPERGLYCEMIA, WITH LONG-TERM CURRENT USE OF INSULIN: ICD-10-CM

## 2022-01-25 DIAGNOSIS — I10 ESSENTIAL HYPERTENSION: ICD-10-CM

## 2022-01-25 DIAGNOSIS — E11.65 TYPE 2 DIABETES MELLITUS WITH HYPERGLYCEMIA, WITH LONG-TERM CURRENT USE OF INSULIN: Primary | ICD-10-CM

## 2022-01-25 DIAGNOSIS — Z79.4 TYPE 2 DIABETES MELLITUS WITH HYPERGLYCEMIA, WITH LONG-TERM CURRENT USE OF INSULIN: Primary | ICD-10-CM

## 2022-01-25 LAB
ALBUMIN SERPL-MCNC: 4.1 G/DL (ref 3.5–5)
ALBUMIN/GLOB SERPL: 1.6 G/DL (ref 1.1–1.8)
ALP SERPL-CCNC: 65 U/L (ref 38–126)
ALT SERPL W P-5'-P-CCNC: 17 U/L
ANION GAP SERPL CALCULATED.3IONS-SCNC: 6 MMOL/L (ref 5–15)
AST SERPL-CCNC: 24 U/L (ref 14–36)
BACTERIA UR QL AUTO: ABNORMAL /HPF
BASOPHILS # BLD AUTO: 0.03 10*3/MM3 (ref 0–0.2)
BASOPHILS NFR BLD AUTO: 0.5 % (ref 0–1.5)
BILIRUB SERPL-MCNC: 0.8 MG/DL (ref 0.2–1.3)
BILIRUB UR QL STRIP: NEGATIVE
BUN SERPL-MCNC: 33 MG/DL (ref 7–23)
BUN/CREAT SERPL: 26.4 (ref 7–25)
CALCIUM SPEC-SCNC: 9.2 MG/DL (ref 8.4–10.2)
CHLORIDE SERPL-SCNC: 102 MMOL/L (ref 101–112)
CHOLEST SERPL-MCNC: 175 MG/DL (ref 150–200)
CLARITY UR: ABNORMAL
CO2 SERPL-SCNC: 28 MMOL/L (ref 22–30)
COLOR UR: ABNORMAL
CREAT SERPL-MCNC: 1.25 MG/DL (ref 0.52–1.04)
DEPRECATED RDW RBC AUTO: 44 FL (ref 37–54)
EOSINOPHIL # BLD AUTO: 0.14 10*3/MM3 (ref 0–0.4)
EOSINOPHIL NFR BLD AUTO: 2.4 % (ref 0.3–6.2)
ERYTHROCYTE [DISTWIDTH] IN BLOOD BY AUTOMATED COUNT: 13.2 % (ref 12.3–15.4)
GFR SERPL CREATININE-BSD FRML MDRD: 43 ML/MIN/1.73 (ref 45–104)
GLOBULIN UR ELPH-MCNC: 2.6 GM/DL (ref 2.3–3.5)
GLUCOSE SERPL-MCNC: 142 MG/DL (ref 70–99)
GLUCOSE UR STRIP-MCNC: NEGATIVE MG/DL
HCT VFR BLD AUTO: 36.3 % (ref 34–46.6)
HDLC SERPL-MCNC: 69 MG/DL (ref 40–59)
HGB BLD-MCNC: 11.9 G/DL (ref 12–15.9)
HGB UR QL STRIP.AUTO: ABNORMAL
HYALINE CASTS UR QL AUTO: ABNORMAL /LPF
KETONES UR QL STRIP: NEGATIVE
LDLC SERPL CALC-MCNC: 92 MG/DL
LDLC/HDLC SERPL: 1.32 {RATIO} (ref 0–3.22)
LEUKOCYTE ESTERASE UR QL STRIP.AUTO: ABNORMAL
LYMPHOCYTES # BLD AUTO: 1.61 10*3/MM3 (ref 0.7–3.1)
LYMPHOCYTES NFR BLD AUTO: 27.3 % (ref 19.6–45.3)
MCH RBC QN AUTO: 30.5 PG (ref 26.6–33)
MCHC RBC AUTO-ENTMCNC: 32.8 G/DL (ref 31.5–35.7)
MCV RBC AUTO: 93.1 FL (ref 79–97)
MONOCYTES # BLD AUTO: 0.63 10*3/MM3 (ref 0.1–0.9)
MONOCYTES NFR BLD AUTO: 10.7 % (ref 5–12)
NEUTROPHILS NFR BLD AUTO: 3.48 10*3/MM3 (ref 1.7–7)
NEUTROPHILS NFR BLD AUTO: 59.1 % (ref 42.7–76)
NITRITE UR QL STRIP: NEGATIVE
PH UR STRIP.AUTO: 6 [PH] (ref 5.5–8)
PLATELET # BLD AUTO: 208 10*3/MM3 (ref 140–450)
PMV BLD AUTO: 9.1 FL (ref 6–12)
POTASSIUM SERPL-SCNC: 5.4 MMOL/L (ref 3.4–5)
PROT SERPL-MCNC: 6.7 G/DL (ref 6.3–8.6)
PROT UR QL STRIP: ABNORMAL
RBC # BLD AUTO: 3.9 10*6/MM3 (ref 3.77–5.28)
RBC # UR STRIP: ABNORMAL /HPF
REF LAB TEST METHOD: ABNORMAL
SODIUM SERPL-SCNC: 136 MMOL/L (ref 137–145)
SP GR UR STRIP: 1.02 (ref 1–1.03)
SQUAMOUS #/AREA URNS HPF: ABNORMAL /HPF
TRIGL SERPL-MCNC: 76 MG/DL
UROBILINOGEN UR QL STRIP: ABNORMAL
VLDLC SERPL-MCNC: 14 MG/DL (ref 5–40)
WBC # UR STRIP: ABNORMAL /HPF
WBC NRBC COR # BLD: 5.89 10*3/MM3 (ref 3.4–10.8)

## 2022-01-25 PROCEDURE — 87186 SC STD MICRODIL/AGAR DIL: CPT | Performed by: NURSE PRACTITIONER

## 2022-01-25 PROCEDURE — 84481 FREE ASSAY (FT-3): CPT | Performed by: NURSE PRACTITIONER

## 2022-01-25 PROCEDURE — 80053 COMPREHEN METABOLIC PANEL: CPT | Performed by: NURSE PRACTITIONER

## 2022-01-25 PROCEDURE — 87086 URINE CULTURE/COLONY COUNT: CPT | Performed by: NURSE PRACTITIONER

## 2022-01-25 PROCEDURE — 81001 URINALYSIS AUTO W/SCOPE: CPT | Performed by: NURSE PRACTITIONER

## 2022-01-25 PROCEDURE — 80061 LIPID PANEL: CPT | Performed by: NURSE PRACTITIONER

## 2022-01-25 PROCEDURE — 85025 COMPLETE CBC W/AUTO DIFF WBC: CPT | Performed by: NURSE PRACTITIONER

## 2022-01-25 PROCEDURE — 84443 ASSAY THYROID STIM HORMONE: CPT | Performed by: NURSE PRACTITIONER

## 2022-01-25 PROCEDURE — 83036 HEMOGLOBIN GLYCOSYLATED A1C: CPT | Performed by: NURSE PRACTITIONER

## 2022-01-25 PROCEDURE — 84436 ASSAY OF TOTAL THYROXINE: CPT | Performed by: NURSE PRACTITIONER

## 2022-01-25 PROCEDURE — 36415 COLL VENOUS BLD VENIPUNCTURE: CPT | Performed by: NURSE PRACTITIONER

## 2022-01-25 PROCEDURE — 87088 URINE BACTERIA CULTURE: CPT | Performed by: NURSE PRACTITIONER

## 2022-01-25 RX ORDER — LISINOPRIL 40 MG/1
TABLET ORAL
Qty: 15 TABLET | Refills: 5 | Status: SHIPPED | OUTPATIENT
Start: 2022-01-25 | End: 2022-07-13

## 2022-01-26 LAB
HBA1C MFR BLD: 11.98 % (ref 4.8–5.6)
T3FREE SERPL-MCNC: 3.31 PG/ML (ref 2–4.4)
T4 SERPL-MCNC: 8.57 MCG/DL (ref 4.5–11.7)
TSH SERPL DL<=0.05 MIU/L-ACNC: 2.26 UIU/ML (ref 0.27–4.2)

## 2022-01-27 ENCOUNTER — OFFICE VISIT (OUTPATIENT)
Dept: FAMILY MEDICINE CLINIC | Facility: CLINIC | Age: 67
End: 2022-01-27

## 2022-01-27 VITALS
HEIGHT: 65 IN | HEART RATE: 77 BPM | DIASTOLIC BLOOD PRESSURE: 62 MMHG | SYSTOLIC BLOOD PRESSURE: 118 MMHG | OXYGEN SATURATION: 99 % | BODY MASS INDEX: 20.43 KG/M2 | WEIGHT: 122.6 LBS

## 2022-01-27 DIAGNOSIS — N30.00 ACUTE CYSTITIS WITHOUT HEMATURIA: ICD-10-CM

## 2022-01-27 DIAGNOSIS — E78.2 MIXED HYPERLIPIDEMIA: Chronic | ICD-10-CM

## 2022-01-27 DIAGNOSIS — E11.65 TYPE 2 DIABETES MELLITUS WITH HYPERGLYCEMIA, WITH LONG-TERM CURRENT USE OF INSULIN: Primary | Chronic | ICD-10-CM

## 2022-01-27 DIAGNOSIS — Z79.4 TYPE 2 DIABETES MELLITUS WITH HYPERGLYCEMIA, WITH LONG-TERM CURRENT USE OF INSULIN: Primary | Chronic | ICD-10-CM

## 2022-01-27 DIAGNOSIS — I10 ESSENTIAL HYPERTENSION: Chronic | ICD-10-CM

## 2022-01-27 LAB — BACTERIA SPEC AEROBE CULT: ABNORMAL

## 2022-01-27 PROCEDURE — 99214 OFFICE O/P EST MOD 30 MIN: CPT | Performed by: NURSE PRACTITIONER

## 2022-01-27 RX ORDER — SULFAMETHOXAZOLE AND TRIMETHOPRIM 800; 160 MG/1; MG/1
1 TABLET ORAL 2 TIMES DAILY
Qty: 14 TABLET | Refills: 0 | Status: SHIPPED | OUTPATIENT
Start: 2022-01-27 | End: 2022-02-03

## 2022-01-28 NOTE — PROGRESS NOTES
"Subjective   Odessa Walker is a 66 y.o. female. Patient here today with complaints of Diabetes (1 month follow up, discuss labs)    The patient presents today for a recheck of diabetes. She had labs recently and is here to review results.    She reports to be doing better overall. She states that her fasting blood glucose levels have been less than 100 in the morning. She relays that her blood glucose levels have been around 120 up to 130. She reports that she is following a diabetic diet and no longer drinking soda. She is taking Ozempic 2 MG and Lantus 10 units at night and feels her medication is effective. Her hemoglobin A1c is improved from 13.2 to 11.9. Of note, the patient reports experiencing elevated glucose levels associated with her knee injection therapy and notes levels up to 300, which stabilized the following day. She has not seen her ophthalmologist in approximately 3 years.    The patient complains of intermittent dysuria, which began last week.     Vitals:    01/27/22 1312   BP: 118/62   Pulse: 77   SpO2: 99%   Weight: 55.6 kg (122 lb 9.6 oz)   Height: 165.1 cm (65\")   PainSc: 0-No pain     Body mass index is 20.4 kg/m².  Past Medical History:   Diagnosis Date   • Arthritis     BACK, KNEES AND SHOULDER   • Arthritis    • Back pain    • Benign essential hypertension    • Candidiasis of skin and nail     L foot   • Chest pain    • Chronic anemia    • Claudication (HCC)    • Corns and callosities    • Depressive disorder    • Dermatitis    • Disease of nail     other specified   • Disorder of peripheral nervous system    • Displaced fracture of third metatarsal bone, right foot, initial encounter for closed fracture    • Displaced fracture of third metatarsal bone, right foot, subsequent encounter for fracture with routine healing    • Diverticular disease of colon    • Dog bite of hand    • Epigastric pain    • Essential hypertension    • Foot pain    • GERD (gastroesophageal reflux disease)    • " H/O screening mammography    • Hallux valgus    • Hammer toe    • Heartburn    • Hyperlipidemia    • Hypertension    • Joint pain    • Joint swelling    • Neurologic disorder associated with diabetes mellitus (HCC)     type 2   • Nondisplaced fracture of fourth metatarsal bone, right foot, initial encounter for closed fracture    • Nondisplaced fracture of fourth metatarsal bone, right foot, subsequent encounter for fracture with routine healing    • Osteoporosis    • Pain in right leg    • Superficial laceration of hand    • Type 2 diabetes mellitus (HCC)    • Urinary tract infectious disease    • Venous insufficiency (chronic) (peripheral)      History of Present Illness     The following portions of the patient's history were reviewed and updated as appropriate: allergies, current medications, past family history, past medical history, past social history, past surgical history and problem list.    Objective   Physical Exam  Vitals and nursing note reviewed.   Constitutional:       General: She is not in acute distress.     Appearance: Normal appearance. She is normal weight. She is not ill-appearing, toxic-appearing or diaphoretic.   HENT:      Head: Normocephalic and atraumatic.   Neck:      Vascular: No carotid bruit.   Cardiovascular:      Rate and Rhythm: Normal rate and regular rhythm.      Heart sounds: Normal heart sounds. No murmur heard.  No friction rub. No gallop.    Pulmonary:      Effort: Pulmonary effort is normal. No respiratory distress.      Breath sounds: Normal breath sounds. No stridor. No wheezing, rhonchi or rales.   Skin:     General: Skin is warm and dry.      Coloration: Skin is not jaundiced or pale.      Findings: No bruising, erythema, lesion or rash.   Neurological:      Mental Status: She is alert and oriented to person, place, and time.   Psychiatric:         Mood and Affect: Mood normal.         Behavior: Behavior normal.         Thought Content: Thought content normal.          Judgment: Judgment normal.        Heart rate regular, no murmurs auscultated.  Lung fields clear, without wheezes, rhonchi, or rales auscultated.  She is ambulatory with assistive cane. Gait is guarded, but steady.    Brief Urine Lab Results  (Last result in the past 365 days)      Color   Clarity   Blood   Leuk Est   Nitrite   Protein   CREAT   Urine HCG        01/25/22 1059 Emy   Cloudy   Trace   Small (1+)   Negative   Trace                   Lab 01/25/22  1059   HEMOGLOBIN A1C 11.98*     TSH Results:      Lab 01/25/22  1059   TSH 2.260     Lipid Panel    Lipid Panel 6/11/21 1/25/22   Total Cholesterol 167 175   Triglycerides 89 76   HDL Cholesterol 63 (A) 69 (A)   VLDL Cholesterol 16 14   LDL Cholesterol  88 92   LDL/HDL Ratio 1.37 1.32   (A) Abnormal value            Lab Results   Component Value Date    GLUCOSE 142 (H) 01/25/2022    BUN 33 (H) 01/25/2022    CREATININE 1.25 (H) 01/25/2022    EGFRIFNONA 43 (L) 01/25/2022    BCR 26.4 (H) 01/25/2022    K 5.4 (H) 01/25/2022    CO2 28.0 01/25/2022    CALCIUM 9.2 01/25/2022    ALBUMIN 4.10 01/25/2022    AST 24 01/25/2022    ALT 17 01/25/2022     WBC   Date Value Ref Range Status   01/25/2022 5.89 3.40 - 10.80 10*3/mm3 Final     RBC   Date Value Ref Range Status   01/25/2022 3.90 3.77 - 5.28 10*6/mm3 Final     Hemoglobin   Date Value Ref Range Status   01/25/2022 11.9 (L) 12.0 - 15.9 g/dL Final   08/16/2021 10.9 (L) 12.7 - 14.7 g/dL Final     Hematocrit   Date Value Ref Range Status   01/25/2022 36.3 34.0 - 46.6 % Final   08/16/2021 33.0 (L) 37.9 - 43.9 % Final     MCV   Date Value Ref Range Status   01/25/2022 93.1 79.0 - 97.0 fL Final     MCH   Date Value Ref Range Status   01/25/2022 30.5 26.6 - 33.0 pg Final     MCHC   Date Value Ref Range Status   01/25/2022 32.8 31.5 - 35.7 g/dL Final     RDW   Date Value Ref Range Status   01/25/2022 13.2 12.3 - 15.4 % Final     RDW-SD   Date Value Ref Range Status   01/25/2022 44.0 37.0 - 54.0 fl Final     MPV   Date Value  Ref Range Status   01/25/2022 9.1 6.0 - 12.0 fL Final     Platelets   Date Value Ref Range Status   01/25/2022 208 140 - 450 10*3/mm3 Final     Neutrophil %   Date Value Ref Range Status   01/25/2022 59.1 42.7 - 76.0 % Final     Lymphocyte %   Date Value Ref Range Status   01/25/2022 27.3 19.6 - 45.3 % Final     Monocyte %   Date Value Ref Range Status   01/25/2022 10.7 5.0 - 12.0 % Final     Eosinophil %   Date Value Ref Range Status   01/25/2022 2.4 0.3 - 6.2 % Final     Basophil %   Date Value Ref Range Status   01/25/2022 0.5 0.0 - 1.5 % Final     Neutrophils, Absolute   Date Value Ref Range Status   01/25/2022 3.48 1.70 - 7.00 10*3/mm3 Final     Lymphocytes, Absolute   Date Value Ref Range Status   01/25/2022 1.61 0.70 - 3.10 10*3/mm3 Final     Monocytes, Absolute   Date Value Ref Range Status   01/25/2022 0.63 0.10 - 0.90 10*3/mm3 Final     Eosinophils, Absolute   Date Value Ref Range Status   01/25/2022 0.14 0.00 - 0.40 10*3/mm3 Final     Basophils, Absolute   Date Value Ref Range Status   01/25/2022 0.03 0.00 - 0.20 10*3/mm3 Final     nRBC   Date Value Ref Range Status   12/07/2016 0  Final   12/07/2016 0  Final           Assessment/Plan   Diagnoses and all orders for this visit:    1. Type 2 diabetes mellitus with hyperglycemia, with long-term current use of insulin (HCC) (Primary)    2. Acute cystitis without hematuria    3. Essential hypertension    4. Mixed hyperlipidemia    Other orders  -     sulfamethoxazole-trimethoprim (Bactrim DS) 800-160 MG per tablet; Take 1 tablet by mouth 2 (Two) Times a Day for 7 days.  Dispense: 14 tablet; Refill: 0    I will treat her with Bactrim DS twice a day for 7 days. I encouraged her to push clear fluids for UTI and encouraged her to follow a better diabetic diet. She will follow up in 3 months for recheck, sooner if needed. Hemoglobin A1c and glucose, as well as finger stick blood sugars are improving greatly. She will continue on same medicine as prescribed  previously for diabetes. All questions and concerns are addressed with understanding noted. The patient is in agreement to above plan.        Transcribed from ambient dictation for SIDDHARTH Jim by Kayla Juarez.  01/28/22   14:37 CST    Patient verbalized consent to the visit recording.  I have personally performed the services described in this document as transcribed by the above individual, and it is both accurate and complete.  SIDDHARTH Jim  1/31/2022  12:31 CST

## 2022-04-11 RX ORDER — SEMAGLUTIDE 1.34 MG/ML
INJECTION, SOLUTION SUBCUTANEOUS
Qty: 1.5 ML | Refills: 2 | Status: SHIPPED | OUTPATIENT
Start: 2022-04-11 | End: 2022-07-06

## 2022-04-20 DIAGNOSIS — E11.65 TYPE 2 DIABETES MELLITUS WITH HYPERGLYCEMIA, WITH LONG-TERM CURRENT USE OF INSULIN: Primary | ICD-10-CM

## 2022-04-20 DIAGNOSIS — Z79.4 TYPE 2 DIABETES MELLITUS WITH HYPERGLYCEMIA, WITH LONG-TERM CURRENT USE OF INSULIN: Primary | ICD-10-CM

## 2022-04-20 RX ORDER — RANOLAZINE 500 MG/1
500 TABLET, EXTENDED RELEASE ORAL 2 TIMES DAILY
Qty: 60 TABLET | Refills: 5 | Status: SHIPPED | OUTPATIENT
Start: 2022-04-20 | End: 2023-01-31 | Stop reason: SDUPTHER

## 2022-04-20 RX ORDER — ROSUVASTATIN CALCIUM 10 MG/1
TABLET, COATED ORAL
Qty: 30 TABLET | Refills: 5 | Status: SHIPPED | OUTPATIENT
Start: 2022-04-20 | End: 2023-01-31 | Stop reason: SDUPTHER

## 2022-04-22 ENCOUNTER — LAB (OUTPATIENT)
Dept: LAB | Facility: OTHER | Age: 67
End: 2022-04-22

## 2022-04-22 DIAGNOSIS — Z79.4 TYPE 2 DIABETES MELLITUS WITH HYPERGLYCEMIA, WITH LONG-TERM CURRENT USE OF INSULIN: ICD-10-CM

## 2022-04-22 DIAGNOSIS — E11.65 TYPE 2 DIABETES MELLITUS WITH HYPERGLYCEMIA, WITH LONG-TERM CURRENT USE OF INSULIN: ICD-10-CM

## 2022-04-22 LAB
ALBUMIN SERPL-MCNC: 4.1 G/DL (ref 3.5–5)
ALBUMIN/GLOB SERPL: 1.4 G/DL (ref 1.1–1.8)
ALP SERPL-CCNC: 44 U/L (ref 38–126)
ALT SERPL W P-5'-P-CCNC: 20 U/L
ANION GAP SERPL CALCULATED.3IONS-SCNC: 7 MMOL/L (ref 5–15)
AST SERPL-CCNC: 28 U/L (ref 14–36)
BILIRUB SERPL-MCNC: 0.6 MG/DL (ref 0.2–1.3)
BUN SERPL-MCNC: 30 MG/DL (ref 7–23)
BUN/CREAT SERPL: 24 (ref 7–25)
CALCIUM SPEC-SCNC: 10.3 MG/DL (ref 8.4–10.2)
CHLORIDE SERPL-SCNC: 102 MMOL/L (ref 101–112)
CO2 SERPL-SCNC: 28 MMOL/L (ref 22–30)
CREAT SERPL-MCNC: 1.25 MG/DL (ref 0.52–1.04)
EGFRCR SERPLBLD CKD-EPI 2021: 47 ML/MIN/1.73
GLOBULIN UR ELPH-MCNC: 3 GM/DL (ref 2.3–3.5)
GLUCOSE SERPL-MCNC: 192 MG/DL (ref 70–99)
HBA1C MFR BLD: 7.9 % (ref 4.8–5.6)
POTASSIUM SERPL-SCNC: 5.6 MMOL/L (ref 3.4–5)
PROT SERPL-MCNC: 7.1 G/DL (ref 6.3–8.6)
SODIUM SERPL-SCNC: 137 MMOL/L (ref 137–145)

## 2022-04-22 PROCEDURE — 83036 HEMOGLOBIN GLYCOSYLATED A1C: CPT | Performed by: NURSE PRACTITIONER

## 2022-04-22 PROCEDURE — 80053 COMPREHEN METABOLIC PANEL: CPT | Performed by: NURSE PRACTITIONER

## 2022-04-22 PROCEDURE — 36415 COLL VENOUS BLD VENIPUNCTURE: CPT | Performed by: NURSE PRACTITIONER

## 2022-04-23 DIAGNOSIS — E11.65 TYPE 2 DIABETES MELLITUS WITH HYPERGLYCEMIA, WITH LONG-TERM CURRENT USE OF INSULIN: Chronic | ICD-10-CM

## 2022-04-23 DIAGNOSIS — Z79.4 TYPE 2 DIABETES MELLITUS WITH HYPERGLYCEMIA, WITH LONG-TERM CURRENT USE OF INSULIN: Chronic | ICD-10-CM

## 2022-04-25 RX ORDER — INSULIN DEGLUDEC INJECTION 100 U/ML
10 INJECTION, SOLUTION SUBCUTANEOUS DAILY
Qty: 15 ML | Refills: 1 | Status: SHIPPED | OUTPATIENT
Start: 2022-04-25 | End: 2022-04-27 | Stop reason: SDUPTHER

## 2022-04-27 ENCOUNTER — OFFICE VISIT (OUTPATIENT)
Dept: FAMILY MEDICINE CLINIC | Facility: CLINIC | Age: 67
End: 2022-04-27

## 2022-04-27 VITALS
HEIGHT: 65 IN | SYSTOLIC BLOOD PRESSURE: 118 MMHG | HEART RATE: 89 BPM | WEIGHT: 120.4 LBS | DIASTOLIC BLOOD PRESSURE: 64 MMHG | BODY MASS INDEX: 20.06 KG/M2 | OXYGEN SATURATION: 99 %

## 2022-04-27 DIAGNOSIS — Z79.4 TYPE 2 DIABETES MELLITUS WITH HYPERGLYCEMIA, WITH LONG-TERM CURRENT USE OF INSULIN: Chronic | ICD-10-CM

## 2022-04-27 DIAGNOSIS — E78.2 MIXED HYPERLIPIDEMIA: Chronic | ICD-10-CM

## 2022-04-27 DIAGNOSIS — E11.65 TYPE 2 DIABETES MELLITUS WITH HYPERGLYCEMIA, WITH LONG-TERM CURRENT USE OF INSULIN: Chronic | ICD-10-CM

## 2022-04-27 DIAGNOSIS — I10 ESSENTIAL HYPERTENSION: Primary | Chronic | ICD-10-CM

## 2022-04-27 PROCEDURE — 99214 OFFICE O/P EST MOD 30 MIN: CPT | Performed by: NURSE PRACTITIONER

## 2022-04-27 RX ORDER — INSULIN DEGLUDEC INJECTION 100 U/ML
10 INJECTION, SOLUTION SUBCUTANEOUS DAILY
Qty: 15 ML | Refills: 5 | Status: SHIPPED | OUTPATIENT
Start: 2022-04-27 | End: 2022-05-28

## 2022-04-27 NOTE — PROGRESS NOTES
"Chief Complaint  Diabetes (3 month follow up)    Subjective          Odessa Walker presents to [facility]  History of Present Illness     The patient presents today with her sister for a recheck of diabetes. She had labs recently and is here to review results.  She reports blood sugars have been improving overall. They have been elevated in the last week as she has run out of Tresiba. She forgot to bring her FSBS logs with her. Reports mostly adhering to a diabetic diet.   She would like referrals to podiatry and opthalmology for diabetic eye and foot exams. She has not seen her nephrologist lately.     Objective   Vital Signs:   /64   Pulse 89   Ht 165.1 cm (65\")   Wt 54.6 kg (120 lb 6.4 oz)   SpO2 99%   BMI 20.04 kg/m²     Physical Exam  Vitals and nursing note reviewed.   Constitutional:       General: She is not in acute distress.     Appearance: Normal appearance. She is not ill-appearing, toxic-appearing or diaphoretic.   HENT:      Head: Normocephalic and atraumatic.   Neck:      Vascular: No carotid bruit.   Cardiovascular:      Rate and Rhythm: Normal rate and regular rhythm.      Pulses: Normal pulses.      Heart sounds: Normal heart sounds. No murmur heard.    No friction rub. No gallop.   Pulmonary:      Effort: Pulmonary effort is normal. No respiratory distress.      Breath sounds: Normal breath sounds. No stridor. No wheezing, rhonchi or rales.   Chest:      Chest wall: No tenderness.   Abdominal:      General: Abdomen is flat. Bowel sounds are normal. There is no distension.      Palpations: Abdomen is soft.      Tenderness: There is no abdominal tenderness.   Musculoskeletal:         General: Normal range of motion.      Cervical back: Normal range of motion and neck supple.      Right lower leg: No edema.      Left lower leg: No edema.      Comments: Uses cane   Skin:     General: Skin is warm and dry.      Coloration: Skin is not " jaundiced or pale.      Findings: No bruising, erythema, lesion or rash.   Neurological:      Mental Status: She is alert and oriented to person, place, and time. Mental status is at baseline.      Cranial Nerves: No cranial nerve deficit.      Motor: No weakness.      Coordination: Coordination normal.      Gait: Gait normal.   Psychiatric:         Mood and Affect: Mood normal.         Behavior: Behavior normal.         Thought Content: Thought content normal.         Judgment: Judgment normal.        Result Review :     CMP    CMP 12/8/21 1/25/22 4/22/22   Glucose 230 (A) 142 (A) 192 (A)   BUN 31 (A) 33 (A) 30 (A)   Creatinine 1.27 (A) 1.25 (A) 1.25 (A)   eGFR Non  Am 42 (A) 43 (A)    Sodium 134 (A) 136 (A) 137   Potassium 4.6 5.4 (A) 5.6 (A)   Chloride 99 (A) 102 102   Calcium 9.9 9.2 10.3 (A)   Albumin 4.10 4.10 4.10   Total Bilirubin 0.5 0.8 0.6   Alkaline Phosphatase 70 65 44   AST (SGOT) 27 24 28   ALT (SGPT) 21 17 20   (A) Abnormal value            CBC    CBC 6/16/21 8/16/21 1/25/22   WBC   5.89   RBC   3.90   Hemoglobin 12.4 (A) 10.9 (A) 11.9 (A)   Hematocrit 38.4 33.0 (A) 36.3   MCV   93.1   MCH   30.5   MCHC   32.8   RDW   13.2   Platelets   208   (A) Abnormal value            Most Recent A1C    HGBA1C Most Recent 4/22/22   Hemoglobin A1C 7.90 (A)   (A) Abnormal value                      Assessment and Plan    Diagnoses and all orders for this visit:    1. Essential hypertension (Primary)    2. Type 2 diabetes mellitus with hyperglycemia, with long-term current use of insulin (HCC)  Comments:  improved  Orders:  -     insulin degludec (Tresiba FlexTouch) 100 UNIT/ML solution pen-injector injection; Inject 10 Units under the skin into the appropriate area as directed Daily for 31 days.  Dispense: 15 mL; Refill: 5  -     Ambulatory Referral to Podiatry  -     Ambulatory Referral for Diabetic Eye Exam-Ophthalmology    3. Mixed hyperlipidemia    A1c is down to 7.9 from 11.98. Refill of Tresiba sent to  pharmacy. Instructed pt to call should she ever run out of medication again. Advised her to call and make an appointment with nephrologist. Referrals sent to ophthalmology and podiatry. She denies questions or concerns and is in agreement with the plan.   BMI is within normal parameters. Follow up in 3 months for recheck or sooner prn. Encouraged to continue with diabetic diet and exercise.        I spent 33 minutes caring for Odessa on this date of service. This time includes time spent by me in the following activities:preparing for the visit, reviewing tests, performing a medically appropriate examination and/or evaluation , counseling and educating the patient/family/caregiver, ordering medications, tests, or procedures and documenting information in the medical record  Follow Up   Return in about 3 months (around 7/27/2022), or if symptoms worsen or fail to improve, for Recheck, or sooner as needed.  Patient was given instructions and counseling regarding her condition or for health maintenance advice. Please see specific information pulled into the AVS if appropriate.

## 2022-05-17 ENCOUNTER — OFFICE VISIT (OUTPATIENT)
Dept: PODIATRY | Facility: CLINIC | Age: 67
End: 2022-05-17

## 2022-05-17 VITALS — OXYGEN SATURATION: 96 % | HEART RATE: 86 BPM | HEIGHT: 65 IN | WEIGHT: 120 LBS | BODY MASS INDEX: 19.99 KG/M2

## 2022-05-17 DIAGNOSIS — M20.41 HAMMER TOES OF BOTH FEET: ICD-10-CM

## 2022-05-17 DIAGNOSIS — M20.11 VALGUS DEFORMITY OF BOTH GREAT TOES: Primary | ICD-10-CM

## 2022-05-17 DIAGNOSIS — L84 PRE-ULCERATIVE CALLUSES: ICD-10-CM

## 2022-05-17 DIAGNOSIS — L60.2 ONYCHOGRYPHOSIS: ICD-10-CM

## 2022-05-17 DIAGNOSIS — B35.1 ONYCHOMYCOSIS: ICD-10-CM

## 2022-05-17 DIAGNOSIS — E11.9 TYPE 2 DIABETES MELLITUS WITHOUT COMPLICATION, WITHOUT LONG-TERM CURRENT USE OF INSULIN: ICD-10-CM

## 2022-05-17 DIAGNOSIS — M20.12 VALGUS DEFORMITY OF BOTH GREAT TOES: Primary | ICD-10-CM

## 2022-05-17 DIAGNOSIS — M20.42 HAMMER TOES OF BOTH FEET: ICD-10-CM

## 2022-05-17 PROCEDURE — 99213 OFFICE O/P EST LOW 20 MIN: CPT | Performed by: PODIATRIST

## 2022-05-17 PROCEDURE — 11721 DEBRIDE NAIL 6 OR MORE: CPT | Performed by: PODIATRIST

## 2022-05-17 PROCEDURE — 11056 PARNG/CUTG B9 HYPRKR LES 2-4: CPT | Performed by: PODIATRIST

## 2022-05-17 NOTE — PROGRESS NOTES
Odessa Parker Denise  1955  67 y.o. female   PCP: LEXY MESSINA 04/27/2022  BS-90 this morning per patient.         05/19/22    Chief Complaint   Patient presents with   • Left Foot - Follow-up     Diabetic foot exam and care   • Right Foot - Follow-up     Diabetic foot exam and care       History of Present Illness    Pleasant 66-year-old female presents to clinic today for a diabetic foot exam and diabetic foot care.    Past Medical History:   Diagnosis Date   • Arthritis     BACK, KNEES AND SHOULDER   • Arthritis    • Back pain    • Benign essential hypertension    • Candidiasis of skin and nail     L foot   • Chest pain    • Chronic anemia    • Claudication (HCC)    • Corns and callosities    • Depressive disorder    • Dermatitis    • Disease of nail     other specified   • Disorder of peripheral nervous system    • Displaced fracture of third metatarsal bone, right foot, initial encounter for closed fracture    • Displaced fracture of third metatarsal bone, right foot, subsequent encounter for fracture with routine healing    • Diverticular disease of colon    • Dog bite of hand    • Epigastric pain    • Essential hypertension    • Foot pain    • GERD (gastroesophageal reflux disease)    • H/O screening mammography    • Hallux valgus    • Hammer toe    • Heartburn    • Hyperlipidemia    • Hypertension    • Joint pain    • Joint swelling    • Neurologic disorder associated with diabetes mellitus (HCC)     type 2   • Nondisplaced fracture of fourth metatarsal bone, right foot, initial encounter for closed fracture    • Nondisplaced fracture of fourth metatarsal bone, right foot, subsequent encounter for fracture with routine healing    • Osteoporosis    • Pain in right leg    • Superficial laceration of hand    • Type 2 diabetes mellitus (HCC)    • Urinary tract infectious disease    • Venous insufficiency (chronic) (peripheral)          Past Surgical History:   Procedure Laterality Date   • BACK SURGERY  2007    • COLONOSCOPY  11/11/2015    Diverticulosis found in the sigmoid colon. Hemorrhoids found in the anus.   • HARDWARE REMOVAL Right 7/19/2019    Procedure: excision of loose body in right knee.;  Surgeon: Pradeep Jacobs MD;  Location: [facility];  Service: Orthopedics   • KNEE SURGERY Right 2005   • OOPHORECTOMY     • OTHER SURGICAL HISTORY  05/06/2015    DEBRIDE NAIL 6 OR MORE    • OTHER SURGICAL HISTORY  05/06/2015    PARING CORN/CALLUS    • OTHER SURGICAL HISTORY  10/25/2015    TREAT METATARSAL FRACTURE    • UPPER GASTROINTESTINAL ENDOSCOPY  11/11/2015    Esophagitis seen.Biopsy taken.A hiatus hernia was found in the esophagus.Gastritis found in the stomach.Biopsy taken   • US VENOUS EXTREMITY UNILATERAL  01/26/2016   • VAGINAL HYSTERECTOMY SALPINGO OOPHORECTOMY  1999         Family History   Problem Relation Age of Onset   • Heart disease Mother    • Diabetes Mother    • Hypertension Mother    • Osteoarthritis Father    • Diabetes Other    • Hypertension Other    • Kidney disease Other    • Ovarian cancer Sister    • Heart disease Sister    • Diabetes Sister    • Hypertension Sister    • Diabetes Brother    • Hypertension Brother          Social History     Socioeconomic History   • Marital status:    Tobacco Use   • Smoking status: Former Smoker     Packs/day: 0.50     Years: 8.00     Pack years: 4.00     Types: Cigarettes   • Smokeless tobacco: Never Used   • Tobacco comment: quit smoking >20 yr ago   Vaping Use   • Vaping Use: Never used   Substance and Sexual Activity   • Alcohol use: No   • Drug use: No   • Sexual activity: Defer         Current Outpatient Medications   Medication Sig Dispense Refill   • aspirin 81 MG chewable tablet Chew 81 mg Daily.     • Calcium Carbonate (CALCIUM 600 PO) Take 1 tablet by mouth Daily.     • CBD oil (cannabidiol) capsule Take  by mouth Daily.     • Flax Oil-Fish Oil-Borage Oil (FISH OIL-FLAX OIL-BORAGE OIL PO) Take 1 tablet by mouth 2 (Two) Times a Day.      • fluticasone (FLONASE) 50 MCG/ACT nasal spray USE 2 SPRAYS IN EACH NOSTRIL DAILY 16 g 3   • Glucosamine HCl (GLUCOSAMINE PO) Take 1,000 mg by mouth.     • glucose monitor monitoring kit 1 each Daily. DX:E11.9 1 each 0   • glucose monitor monitoring kit 1 each Daily. DX E11.9 1 each 0   • insulin degludec (Tresiba FlexTouch) 100 UNIT/ML solution pen-injector injection Inject 10 Units under the skin into the appropriate area as directed Daily for 31 days. 15 mL 5   • Insulin Pen Needle (Easy Touch Pen Needles) 31G X 8 MM misc USE AS INSTRUCTED WITH VICTOZA 100 each 5   • IRON PO Take 65 mg by mouth Daily.     • Lancets misc Once daily 100 each 12   • lisinopril (PRINIVIL,ZESTRIL) 40 MG tablet TAKE 1/2 TABLET BY MOUTH DAILY 15 tablet 5   • magnesium oxide (MAG-OX) 400 MG tablet Take 400 mg by mouth.     • magnesium oxide (MAGOX) 400 (241.3 Mg) MG tablet tablet Take 400 mg by mouth 2 (Two) Times a Day.     • Multiple Vitamins-Minerals (ONE-A-DAY 50 PLUS PO) Take 1 tablet by mouth Daily.     • ONE TOUCH LANCETS misc 1 applicator Daily. 200 each 12   • Ozempic, 0.25 or 0.5 MG/DOSE, 2 MG/1.5ML solution pen-injector INJECT 0.5 MG UNDER THE SKIN INTO THE APPROPRIATE AREA AS DIRECTED 1 (ONE) TIME PER WEEK. *VERIFIED 0.5MG DOSE PER VENKATA AT [facility] 11/23/21* 1.5 mL 2   • ranolazine (RANEXA) 500 MG 12 hr tablet TAKE 1 TABLET BY MOUTH 2 (TWO) TIMES A DAY. 60 tablet 5   • rosuvastatin (CRESTOR) 10 MG tablet TAKE 1 TABLET BY MOUTH DAILY. **MD BALDERRAMA'D DRUG INTERACTION WITH RANEXA**10/27/20 HC 30 tablet 5   • sertraline (ZOLOFT) 50 MG tablet TAKE 1 TABLET BY MOUTH DAILY 30 tablet 3   • Tradjenta 5 MG tablet tablet TAKE 1 TABLET BY MOUTH DAILY. 30 tablet 5   • True Metrix Blood Glucose Test test strip 1 EACH BY OTHER ROUTE DAILY. USE TO TEST BLOOD SUGAR ONCE DAILY 100 each 5   • denosumab (PROLIA) 60 MG/ML solution prefilled syringe syringe Inject 1 mL under the skin into the appropriate area as directed 1 (One) Time for 1  "dose. 180 mL 1     No current facility-administered medications for this visit.     Review of Systems   Constitutional: Negative.    HENT: Negative.    Eyes: Negative.    Respiratory: Negative.    Cardiovascular: Negative.    Gastrointestinal: Negative.    Musculoskeletal:        Toe pain and deformities   Skin:        Callus right foot   Psychiatric/Behavioral: Negative.          OBJECTIVE    Pulse 86   Ht 165.1 cm (65\")   Wt 54.4 kg (120 lb)   SpO2 96%   BMI 19.97 kg/m²      Physical Exam  Vitals reviewed.   Constitutional:       General: She is not in acute distress.     Appearance: She is well-developed.   HENT:      Head: Normocephalic and atraumatic.      Nose: Nose normal.   Eyes:      Conjunctiva/sclera: Conjunctivae normal.      Pupils: Pupils are equal, round, and reactive to light.   Neck:      Trachea: No tracheal deviation.   Pulmonary:      Effort: Pulmonary effort is normal. No respiratory distress.      Breath sounds: No wheezing.   Musculoskeletal:         General: Tenderness and deformity present. Normal range of motion.      Cervical back: Normal range of motion.   Skin:     General: Skin is warm and dry.      Capillary Refill: Capillary refill takes less than 2 seconds.   Neurological:      Mental Status: She is alert and oriented to person, place, and time.      Deep Tendon Reflexes: Reflexes normal.   Psychiatric:         Behavior: Behavior normal.         Thought Content: Thought content normal.       Lower Extremity:     Cardiovascular:    DP/PT pulses faintly palpable    CFT brisk  to all digits  Skin temp is warm to warm from proximal tibia to distal digits  Pedal hair growth absent.   Musculoskeletal:  Muscle strength is 5/5 for all muscle groups tested   Moderate to severe hallux valgus noted right, mild to moderate hallux valgus left  Rigidly contracted second digit on the right, reducibly contracted digits 3,4 and 5 right  Reducibly contracted digits 2 through 5 left  Diminished fat " pad bilateral  Pain with first MPJ range of motion bilateral  Dermatological:   Nails 1-5 are thickened and discolored, elongated with subungual.  Pain on palpation to the nail plates.  Skin is warm, dry and intact    Webspaces 1-4 bilateral are clean, dry and intact.   No subcutaneous nodules or masses noted    No open wounds noted   Hyperkeratotic lesion noted to plantar second metatarsal head bilateral.  Pain on direct palpation.  Neurological:   Protective sensation diminished  Sensation intact to light touch        Procedures        ASSESSMENT AND PLAN    Diagnoses and all orders for this visit:    1. Valgus deformity of both great toes (Primary)  -     XR foot 3+ vw bilateral; Future    2. Hammer toes of both feet  -     XR foot 3+ vw bilateral; Future    3. Onychomycosis    4. Onychogryphosis    5. Type 2 diabetes mellitus without complication, without long-term current use of insulin (HCC)    6. Pre-ulcerative calluses        -Discussed potential surgical treatment options for patient's digital deformities.  Radiographs will be obtained on the patient's way out and reviewed on her next appointment.  - Trimmed hyperkeratotic lesions noted in objective with a #15 blade without incident.  ABN signed by the patient prior to nail and callus debridement  - Nails 1-5 bilateral were debrided in length and thickness with nail nipper and electric  to decrease fungal load and risk of infection.   - All the patients questions were answered.  - RTC 3 months or sooner if needed.         This document has been electronically signed by Ge Elizondo DPM on May 19, 2022 14:53 CDT     5/19/2022  14:53 CDT

## 2022-06-24 ENCOUNTER — OFFICE VISIT (OUTPATIENT)
Dept: CARDIOLOGY | Facility: CLINIC | Age: 67
End: 2022-06-24

## 2022-06-24 VITALS
DIASTOLIC BLOOD PRESSURE: 70 MMHG | OXYGEN SATURATION: 99 % | SYSTOLIC BLOOD PRESSURE: 118 MMHG | WEIGHT: 119 LBS | HEART RATE: 81 BPM | BODY MASS INDEX: 19.83 KG/M2 | TEMPERATURE: 96.9 F | HEIGHT: 65 IN

## 2022-06-24 DIAGNOSIS — I10 ESSENTIAL HYPERTENSION: ICD-10-CM

## 2022-06-24 DIAGNOSIS — R00.2 PALPITATIONS: Primary | ICD-10-CM

## 2022-06-24 PROCEDURE — 99213 OFFICE O/P EST LOW 20 MIN: CPT | Performed by: INTERNAL MEDICINE

## 2022-06-24 NOTE — PROGRESS NOTES
Odessa Walker  67 y.o. female    06/24/2022  Chief Complaint   Patient presents with   • Palpitations       History of Present Illness    History of palpitations  -        SUBJECTIVE  Patient is feeling well.  She is having no palpitations.  She denies any chest pain.  She is having no shortness of air or syncope.  Her blood pressures under good control.  Allergies   Allergen Reactions   • Penicillins Rash         Past Medical History:   Diagnosis Date   • Arthritis     BACK, KNEES AND SHOULDER   • Arthritis    • Back pain    • Benign essential hypertension    • Candidiasis of skin and nail     L foot   • Chest pain    • Chronic anemia    • Claudication (HCC)    • Corns and callosities    • Depressive disorder    • Dermatitis    • Disease of nail     other specified   • Disorder of peripheral nervous system    • Displaced fracture of third metatarsal bone, right foot, initial encounter for closed fracture    • Displaced fracture of third metatarsal bone, right foot, subsequent encounter for fracture with routine healing    • Diverticular disease of colon    • Dog bite of hand    • Epigastric pain    • Essential hypertension    • Foot pain    • GERD (gastroesophageal reflux disease)    • H/O screening mammography    • Hallux valgus    • Hammer toe    • Heartburn    • Hyperlipidemia    • Hypertension    • Joint pain    • Joint swelling    • Neurologic disorder associated with diabetes mellitus (HCC)     type 2   • Nondisplaced fracture of fourth metatarsal bone, right foot, initial encounter for closed fracture    • Nondisplaced fracture of fourth metatarsal bone, right foot, subsequent encounter for fracture with routine healing    • Osteoporosis    • Pain in right leg    • Superficial laceration of hand    • Type 2 diabetes mellitus (HCC)    • Urinary tract infectious disease    • Venous insufficiency (chronic) (peripheral)          Past Surgical History:   Procedure Laterality Date   • BACK SURGERY  2007   •  COLONOSCOPY  11/11/2015    Diverticulosis found in the sigmoid colon. Hemorrhoids found in the anus.   • HARDWARE REMOVAL Right 7/19/2019    Procedure: excision of loose body in right knee.;  Surgeon: Pradeep Jacobs MD;  Location: [facility];  Service: Orthopedics   • KNEE SURGERY Right 2005   • OOPHORECTOMY     • OTHER SURGICAL HISTORY  05/06/2015    DEBRIDE NAIL 6 OR MORE    • OTHER SURGICAL HISTORY  05/06/2015    PARING CORN/CALLUS    • OTHER SURGICAL HISTORY  10/25/2015    TREAT METATARSAL FRACTURE    • UPPER GASTROINTESTINAL ENDOSCOPY  11/11/2015    Esophagitis seen.Biopsy taken.A hiatus hernia was found in the esophagus.Gastritis found in the stomach.Biopsy taken   • US VENOUS EXTREMITY UNILATERAL  01/26/2016   • VAGINAL HYSTERECTOMY SALPINGO OOPHORECTOMY  1999         Family History   Problem Relation Age of Onset   • Heart disease Mother    • Diabetes Mother    • Hypertension Mother    • Osteoarthritis Father    • Diabetes Other    • Hypertension Other    • Kidney disease Other    • Ovarian cancer Sister    • Heart disease Sister    • Diabetes Sister    • Hypertension Sister    • Diabetes Brother    • Hypertension Brother          Social History     Socioeconomic History   • Marital status:    Tobacco Use   • Smoking status: Former Smoker     Packs/day: 0.50     Years: 8.00     Pack years: 4.00     Types: Cigarettes   • Smokeless tobacco: Never Used   • Tobacco comment: quit smoking >20 yr ago   Vaping Use   • Vaping Use: Never used   Substance and Sexual Activity   • Alcohol use: No   • Drug use: No   • Sexual activity: Defer         Prior to Admission medications    Medication Sig Start Date End Date Taking? Authorizing Provider   aspirin 81 MG chewable tablet Chew 81 mg Daily.   Yes Dee Singleton MD   Calcium Carbonate (CALCIUM 600 PO) Take 1 tablet by mouth Daily.   Yes Dee Singleton MD   CBD oil (cannabidiol) capsule Take  by mouth Daily.   Yes Dee Singleton MD    Flax Oil-Fish Oil-Borage Oil (FISH OIL-FLAX OIL-BORAGE OIL PO) Take 1 tablet by mouth 2 (Two) Times a Day.   Yes Dee Singleton MD   fluticasone (FLONASE) 50 MCG/ACT nasal spray USE 2 SPRAYS IN EACH NOSTRIL DAILY 8/30/21  Yes Kendal Mccoy APRN   Glucosamine HCl (GLUCOSAMINE PO) Take 1,000 mg by mouth.   Yes Dee Singleton MD   glucose monitor monitoring kit 1 each Daily. DX:E11.9 1/24/19  Yes Kendal Mccoy APRN   glucose monitor monitoring kit 1 each Daily. DX E11.9 10/28/20  Yes Kendal Mccoy APRN   Insulin Pen Needle (Easy Touch Pen Needles) 31G X 8 MM misc USE AS INSTRUCTED WITH VICTOZA 8/19/21  Yes Kendal Mccoy APRN   IRON PO Take 65 mg by mouth Daily.   Yes Dee Singleton MD   Lancets misc Once daily 1/24/19  Yes Kendal Mccoy APRN   lisinopril (PRINIVIL,ZESTRIL) 40 MG tablet TAKE 1/2 TABLET BY MOUTH DAILY 1/25/22  Yes Kendal Mccoy APRN   magnesium oxide (MAG-OX) 400 MG tablet Take 400 mg by mouth. 12/20/21 12/21/22 Yes Dee Singleton MD   magnesium oxide (MAGOX) 400 (241.3 Mg) MG tablet tablet Take 400 mg by mouth 2 (Two) Times a Day.   Yes Dee Singleton MD   Multiple Vitamins-Minerals (ONE-A-DAY 50 PLUS PO) Take 1 tablet by mouth Daily.   Yes Dee Singleton MD   ONE TOUCH LANCETS misc 1 applicator Daily. 4/11/18  Yes Kendal Mccoy APRN   Ozempic, 0.25 or 0.5 MG/DOSE, 2 MG/1.5ML solution pen-injector INJECT 0.5 MG UNDER THE SKIN INTO THE APPROPRIATE AREA AS DIRECTED 1 (ONE) TIME PER WEEK. *VERIFIED 0.5MG DOSE PER VENKATA AT [facility] [place] OFFICE 11/23/21* 4/11/22  Yes Kendal Mccoy APRN   ranolazine (RANEXA) 500 MG 12 hr tablet TAKE 1 TABLET BY MOUTH 2 (TWO) TIMES A DAY. 4/20/22  Yes Leslie Kapadia MD   rosuvastatin (CRESTOR) 10 MG tablet TAKE 1 TABLET BY MOUTH DAILY. **MD [facility] DRUG INTERACTION WITH RANEXA**10/27/20 [facility] 4/20/22  Yes Leslie Kapadia MD   sertraline (ZOLOFT) 50 MG tablet TAKE 1 TABLET BY MOUTH DAILY 6/16/22  Yes Kendal Mccoy,  "SIDDHARTH   Tradjenta 5 MG tablet tablet TAKE 1 TABLET BY MOUTH DAILY. 12/28/21  Yes Kendal Mccoy APRN   True Metrix Blood Glucose Test test strip 1 EACH BY OTHER ROUTE DAILY. USE TO TEST BLOOD SUGAR ONCE DAILY 9/2/21  Yes Kendal Mccoy APRN   denosumab (PROLIA) 60 MG/ML solution prefilled syringe syringe Inject 1 mL under the skin into the appropriate area as directed 1 (One) Time for 1 dose. 1/12/22 1/12/22  Kendal Mccoy APRN         OBJECTIVE    /70 (BP Location: Left arm, Patient Position: Sitting, Cuff Size: Adult)   Pulse 81   Temp 96.9 °F (36.1 °C)   Ht 165.1 cm (65\")   Wt 54 kg (119 lb)   SpO2 99%   BMI 19.80 kg/m²         Review of Systems     Constitutional:  Denies recent weight loss, weight gain, fever or chills, no change in exercise tolerance     HENT:  Denies any hearing loss, epistaxis, hoarseness, or difficulty speaking.     Eyes: Wears eyeglasses or contact lenses     Respiratory:  Denies dyspnea with exertion,no cough, wheezing, or hemoptysis.     Cardiovascular: Negative for palpations, chest pain, orthopnea, PND, peripheral edema, syncope, or claudication.     Gastrointestinal:  Denies change in bowel habits, dyspepsia, ulcer disease, hematochezia, or melena.     Endocrine: Negative for cold intolerance, heat intolerance, polydipsia, polyphagia and polyuria. Denies any history of weight change, heat/cold intolerance, polydipsia, polyuria     Genitourinary: Negative.      Musculoskeletal: Denies any history of arthritic symptoms or back problems     Skin:  Denies any change in hair or nails, rashes, or skin lesions.     Allergic/Immunologic: Negative.  Negative for environmental allergies, food allergies and immunocompromised state.     Neurological:  Denies any history of recurrent headaches, strokes, TIA, or seizure disorder.     Hematological: Denies any food allergies, seasonal allergies, bleeding disorders, or lymphadenopathy.     Psychiatric/Behavioral: Denies any history of " depression, substance abuse, or change in cognitive function.         Physical Exam     Constitutional: Cooperative, alert and oriented, well-developed, well-nourished, in no acute distress.     HENT:   Head: Normocephalic, normal hair patterns, no masses or tenderness.  Ears, Nose, and Throat: No gross abnormalities. No pallor or cyanosis. Dentition good.   Eyes: EOMS intact, PERRL, conjunctivae and lids unremarkable. Fundoscopic exam and visual fields not performed.   Neck: No palpable masses or adenopathy, no thyromegaly, no JVD, carotid pulses are full and equal bilaterally and without  Bruits.     Cardiovascular: Regular rhythm, S1 and S2 normal, no S3 or S4. Apical impulse not displaced. No murmurs, gallops, or rubs detected.     Pulmonary/Chest: Chest: normal symmetry, no tenderness to palpation, normal respiratory excursion, no intercostal retraction, no use of accessory muscles.            Pulmonary: Normal breath sounds. No rales or ronchi.    Abdominal: Abdomen soft, bowel sounds normoactive, no masses, no hepatosplenomegaly, non-tender, no bruits.     Musculoskeletal: No deformities, clubbing, cyanosis, erythema, or edema observed. There are no spinal abnormalities noted. Normal muscle strength and tone. Pulses full and equal in all extremities, no bruits auscultated.     Neurological: No gross motor or sensory deficits noted, affect appropriate, oriented to time, person, place.     Skin: Warm and dry to the touch, no apparent skin lesions or masses noted.     Psychiatric: She has a normal mood and affect. Her behavior is normal. Judgment and thought content normal.         Procedures      Lab Results   Component Value Date    WBC 5.89 01/25/2022    HGB 11.9 (L) 01/25/2022    HCT 36.3 01/25/2022    MCV 93.1 01/25/2022     01/25/2022     Lab Results   Component Value Date    GLUCOSE 192 (H) 04/22/2022    BUN 30 (H) 04/22/2022    CREATININE 1.25 (H) 04/22/2022    EGFRIFNONA 43 (L) 01/25/2022    BCR  24.0 04/22/2022    CO2 28.0 04/22/2022    CALCIUM 10.3 (H) 04/22/2022    ALBUMIN 4.10 04/22/2022    AST 28 04/22/2022    ALT 20 04/22/2022     Lab Results   Component Value Date    CHOL 175 01/25/2022    CHOL 167 06/11/2021    CHOL 142 (L) 10/27/2020     Lab Results   Component Value Date    TRIG 76 01/25/2022    TRIG 89 06/11/2021    TRIG 65 10/27/2020     Lab Results   Component Value Date    HDL 69 (H) 01/25/2022    HDL 63 (H) 06/11/2021    HDL 71 (H) 10/27/2020     No components found for: LDLCALC  Lab Results   Component Value Date    LDL 92 01/25/2022    LDL 88 06/11/2021    LDL 58 10/27/2020     No results found for: HDLLDLRATIO  No components found for: CHOLHDL  Lab Results   Component Value Date    HGBA1C 7.90 (H) 04/22/2022     Lab Results   Component Value Date    TSH 2.260 01/25/2022    Z3EHJZQ 117 05/31/2016    R9KHLJZ 8.57 01/25/2022           ASSESSMENT AND PLAN      Diagnoses and all orders for this visit:    1. Palpitations (Primary)    2. Essential hypertension    The patient is doing quite well.  At this point there will be no change in her medicines.  She is tolerating the Ranexa well and is having no issue with it.  Her cholesterol is under good control.    BMI is within normal parameters. No other follow-up for BMI required.        She will be follow-up in a year.        Leslie Kapadia MD  6/24/2022  11:27 CDT

## 2022-07-06 RX ORDER — SEMAGLUTIDE 1.34 MG/ML
INJECTION, SOLUTION SUBCUTANEOUS
Qty: 1.5 ML | Refills: 5 | Status: SHIPPED | OUTPATIENT
Start: 2022-07-06 | End: 2022-12-19

## 2022-07-13 RX ORDER — LISINOPRIL 40 MG/1
TABLET ORAL
Qty: 15 TABLET | Refills: 5 | Status: SHIPPED | OUTPATIENT
Start: 2022-07-13 | End: 2023-01-05

## 2022-07-19 DIAGNOSIS — Z79.4 TYPE 2 DIABETES MELLITUS WITH HYPERGLYCEMIA, WITH LONG-TERM CURRENT USE OF INSULIN: Primary | ICD-10-CM

## 2022-07-19 DIAGNOSIS — E11.65 TYPE 2 DIABETES MELLITUS WITH HYPERGLYCEMIA, WITH LONG-TERM CURRENT USE OF INSULIN: Primary | ICD-10-CM

## 2022-07-21 ENCOUNTER — LAB (OUTPATIENT)
Dept: LAB | Facility: OTHER | Age: 67
End: 2022-07-21

## 2022-07-21 DIAGNOSIS — E11.65 TYPE 2 DIABETES MELLITUS WITH HYPERGLYCEMIA, WITH LONG-TERM CURRENT USE OF INSULIN: ICD-10-CM

## 2022-07-21 DIAGNOSIS — Z79.4 TYPE 2 DIABETES MELLITUS WITH HYPERGLYCEMIA, WITH LONG-TERM CURRENT USE OF INSULIN: ICD-10-CM

## 2022-07-21 LAB
ALBUMIN SERPL-MCNC: 4.1 G/DL (ref 3.5–5)
ALBUMIN/GLOB SERPL: 1.4 G/DL (ref 1.1–1.8)
ALP SERPL-CCNC: 45 U/L (ref 38–126)
ALT SERPL W P-5'-P-CCNC: 17 U/L
ANION GAP SERPL CALCULATED.3IONS-SCNC: 8 MMOL/L (ref 5–15)
AST SERPL-CCNC: 26 U/L (ref 14–36)
BILIRUB SERPL-MCNC: 0.6 MG/DL (ref 0.2–1.3)
BUN SERPL-MCNC: 32 MG/DL (ref 7–23)
BUN/CREAT SERPL: 23.9 (ref 7–25)
CALCIUM SPEC-SCNC: 9.6 MG/DL (ref 8.4–10.2)
CHLORIDE SERPL-SCNC: 104 MMOL/L (ref 101–112)
CO2 SERPL-SCNC: 28 MMOL/L (ref 22–30)
CREAT SERPL-MCNC: 1.34 MG/DL (ref 0.52–1.04)
EGFRCR SERPLBLD CKD-EPI 2021: 43.3 ML/MIN/1.73
GLOBULIN UR ELPH-MCNC: 2.9 GM/DL (ref 2.3–3.5)
GLUCOSE SERPL-MCNC: 128 MG/DL (ref 70–99)
POTASSIUM SERPL-SCNC: 5 MMOL/L (ref 3.4–5)
PROT SERPL-MCNC: 7 G/DL (ref 6.3–8.6)
SODIUM SERPL-SCNC: 140 MMOL/L (ref 137–145)

## 2022-07-21 PROCEDURE — 83036 HEMOGLOBIN GLYCOSYLATED A1C: CPT | Performed by: NURSE PRACTITIONER

## 2022-07-21 PROCEDURE — 36415 COLL VENOUS BLD VENIPUNCTURE: CPT | Performed by: NURSE PRACTITIONER

## 2022-07-21 PROCEDURE — 80053 COMPREHEN METABOLIC PANEL: CPT | Performed by: NURSE PRACTITIONER

## 2022-07-22 LAB — HBA1C MFR BLD: 7.5 % (ref 4.8–5.6)

## 2022-07-27 ENCOUNTER — OFFICE VISIT (OUTPATIENT)
Dept: FAMILY MEDICINE CLINIC | Facility: CLINIC | Age: 67
End: 2022-07-27

## 2022-07-27 VITALS
SYSTOLIC BLOOD PRESSURE: 98 MMHG | OXYGEN SATURATION: 90 % | HEIGHT: 65 IN | WEIGHT: 117 LBS | HEART RATE: 77 BPM | BODY MASS INDEX: 19.49 KG/M2 | DIASTOLIC BLOOD PRESSURE: 62 MMHG

## 2022-07-27 DIAGNOSIS — E11.65 TYPE 2 DIABETES MELLITUS WITH HYPERGLYCEMIA, WITH LONG-TERM CURRENT USE OF INSULIN: Primary | Chronic | ICD-10-CM

## 2022-07-27 DIAGNOSIS — E78.2 MIXED HYPERLIPIDEMIA: Chronic | ICD-10-CM

## 2022-07-27 DIAGNOSIS — I10 ESSENTIAL HYPERTENSION: Chronic | ICD-10-CM

## 2022-07-27 DIAGNOSIS — Z79.4 TYPE 2 DIABETES MELLITUS WITH HYPERGLYCEMIA, WITH LONG-TERM CURRENT USE OF INSULIN: Primary | Chronic | ICD-10-CM

## 2022-07-27 DIAGNOSIS — L60.3 BRITTLE NAILS: ICD-10-CM

## 2022-07-27 PROCEDURE — 99214 OFFICE O/P EST MOD 30 MIN: CPT | Performed by: NURSE PRACTITIONER

## 2022-08-02 NOTE — PROGRESS NOTES
"Chief Complaint  Diabetes (3 month follow up with labs. Patient has been taking tresiba but its been took off medicine list. Wanted to know if she still needs to take it.//Also wants to ask about fingernails. States they are falling off/)    Subjective        Odessa Elena Walker presents to [facility]  History of Present Illness  Patient here today with sister to go over her labs.  She is here for recheck of hypertension hyperlipidemia and diabetes.  She states that there is some question regarding her insulin.  She was taking Tresiba however a sister of hers inadvertently took that out of her fridge and she no longer has any Tresiba.  She states that she has switched back on her own to using Basaglar instead but says that her blood sugars are not well controlled on this.  She does continue on Ozempic.  She had a diabetic foot exam with Dr. Elizondo years ago and follows Dr. Mary, nephrology.  States that she will call back when she needs refills.  Is complaining of brittle nails which have been going on now for the last month.  And is requesting shingles vaccine be sent to [facility].  Reports her blood sugar this morning was 98 at home.  No complaints of chest pain headache shortness of breath.    Objective   Vital Signs:  BP 98/62   Pulse 77   Ht 165.1 cm (65\")   Wt 53.1 kg (117 lb)   SpO2 90%   BMI 19.47 kg/m²   Estimated body mass index is 19.47 kg/m² as calculated from the following:    Height as of this encounter: 165.1 cm (65\").    Weight as of this encounter: 53.1 kg (117 lb).    BMI is within normal parameters. No other follow-up for BMI required.      Physical Exam  Vitals and nursing note reviewed.   Constitutional:       General: She is not in acute distress.     Appearance: Normal appearance. She is not ill-appearing, toxic-appearing or diaphoretic.   HENT:      Head: Normocephalic and atraumatic.   Neck:      Vascular: No carotid bruit. "   Cardiovascular:      Rate and Rhythm: Normal rate and regular rhythm.      Heart sounds: Normal heart sounds. No murmur heard.    No friction rub. No gallop.   Pulmonary:      Effort: Pulmonary effort is normal. No respiratory distress.      Breath sounds: Normal breath sounds. No stridor. No wheezing, rhonchi or rales.   Musculoskeletal:      Right lower leg: No edema.      Left lower leg: No edema.      Comments: Ambulatory with assist, gait guarded, slowed , but steady   Skin:     General: Skin is warm and dry.      Coloration: Skin is not jaundiced or pale.      Findings: No bruising, erythema, lesion or rash.   Neurological:      Mental Status: She is alert and oriented to person, place, and time.   Psychiatric:         Mood and Affect: Mood normal.         Speech: Speech is rapid and pressured.         Behavior: Behavior normal.         Thought Content: Thought content normal.         Judgment: Judgment normal.        Result Review :    Common labs    Common Labsle 1/25/22 1/25/22 1/25/22 1/25/22 4/22/22 4/22/22 7/21/22 7/21/22    1059 1059 1059 1059 [id number] 1019 1019   Glucose  142 (A)   192 (A)  128 (A)    BUN  33 (A)   30 (A)  32 (A)    Creatinine  1.25 (A)   1.25 (A)  1.34 (A)    eGFR Non  Am  43 (A)         Sodium  136 (A)   137  140    Potassium  5.4 (A)   5.6 (A)  5.0    Chloride  102   102  104    Calcium  9.2   10.3 (A)  9.6    Albumin  4.10   4.10  4.10    Total Bilirubin  0.8   0.6  0.6    Alkaline Phosphatase  65   44  45    AST (SGOT)  24   28  26    ALT (SGPT)  17   20  17    WBC 5.89          Hemoglobin 11.9 (A)          Hematocrit 36.3          Platelets 208          Total Cholesterol   175        Triglycerides   76        HDL Cholesterol   69 (A)        LDL Cholesterol    92        Hemoglobin A1C    11.98 (A)  7.90 (A)  7.50 (A)   (A) Abnormal value            CMP    CMP 1/25/22 4/22/22 7/21/22   Glucose 142 (A) 192 (A) 128 (A)   BUN 33 (A) 30 (A) 32 (A)   Creatinine 1.25 (A) 1.25  (A) 1.34 (A)   eGFR Non African Am 43 (A)     Sodium 136 (A) 137 140   Potassium 5.4 (A) 5.6 (A) 5.0   Chloride 102 102 104   Calcium 9.2 10.3 (A) 9.6   Albumin 4.10 4.10 4.10   Total Bilirubin 0.8 0.6 0.6   Alkaline Phosphatase 65 44 45   AST (SGOT) 24 28 26   ALT (SGPT) 17 20 17   (A) Abnormal value            CBC    CBC 8/16/21 1/25/22   WBC  5.89   RBC  3.90   Hemoglobin 10.9 (A) 11.9 (A)   Hematocrit 33.0 (A) 36.3   MCV  93.1   MCH  30.5   MCHC  32.8   RDW  13.2   Platelets  208   (A) Abnormal value            CBC w/diff    CBC w/Diff 8/16/21 1/25/22   WBC  5.89   RBC  3.90   Hemoglobin 10.9 (A) 11.9 (A)   Hematocrit 33.0 (A) 36.3   MCV  93.1   MCH  30.5   MCHC  32.8   RDW  13.2   Platelets  208   Neutrophil Rel %  59.1   Lymphocyte Rel %  27.3   Monocyte Rel %  10.7   Eosinophil Rel %  2.4   Basophil Rel %  0.5   (A) Abnormal value            Lipid Panel    Lipid Panel 1/25/22   Total Cholesterol 175   Triglycerides 76   HDL Cholesterol 69 (A)   VLDL Cholesterol 14   LDL Cholesterol  92   LDL/HDL Ratio 1.32   (A) Abnormal value            TSH    TSH 1/25/22   TSH 2.260           A1C Last 3 Results    HGBA1C Last 3 Results 1/25/22 4/22/22 7/21/22   Hemoglobin A1C 11.98 (A) 7.90 (A) 7.50 (A)   (A) Abnormal value                      Assessment and Plan   Diagnoses and all orders for this visit:    1. Type 2 diabetes mellitus with hyperglycemia, with long-term current use of insulin (HCC) (Primary)  -     Microalbumin / Creatinine Urine Ratio - Urine, Clean Catch    2. Essential hypertension    3. Mixed hyperlipidemia  -     Lipid panel; Future    4. Brittle nails  -     Vitamin D 25 hydroxy; Future  -     Vitamin B12; Future    Other orders  -     Zoster Vac Recomb Adjuvanted 50 MCG/0.5ML reconstituted suspension; Inject 0.5 mL into the appropriate muscle as directed by prescriber 1 (One) Time for 1 dose.  Dispense: 1 each; Refill: 1    I will obtain labs as above and inform her of results via phone.  These will  be drawn when fasting.  Shingles vaccine is prescribed and she can receive this from [facility].  She will follow-up here in 3 months for recheck or sooner if needed.  I have had a LONG discussion with patient and sister in the office with her today about taking her medication as prescribed.  She needs to get back on Tresiba and is given a sample of this in office today we will continue on Ozempic we will stop Basaglar.  We will monitor blood sugars closely and follow-up as above or sooner if needed.  She is encouraged to follow a diabetic diet.  Encouraged to continue with nephrology.  Needs to follow-up with podiatry for diabetic foot exam as well.  All questions and concerns addressed with understanding verbalized.  Patient aware and in agreement to this plan as is her sister who is with her in the office today, Aranaz.       I spent 33 minutes caring for Odessa on this date of service. This time includes time spent by me in the following activities:preparing for the visit, reviewing tests, performing a medically appropriate examination and/or evaluation , counseling and educating the patient/family/caregiver, ordering medications, tests, or procedures and documenting information in the medical record  Follow Up   Return in about 3 months (around 10/27/2022), or if symptoms worsen or fail to improve, for Recheck, or sooner as needed.  Patient was given instructions and counseling regarding her condition or for health maintenance advice. Please see specific information pulled into the AVS if appropriate.

## 2022-08-05 ENCOUNTER — LAB (OUTPATIENT)
Dept: LAB | Facility: OTHER | Age: 67
End: 2022-08-05

## 2022-08-05 DIAGNOSIS — E78.2 MIXED HYPERLIPIDEMIA: Chronic | ICD-10-CM

## 2022-08-05 DIAGNOSIS — L60.3 BRITTLE NAILS: ICD-10-CM

## 2022-08-05 LAB
CHOLEST SERPL-MCNC: 178 MG/DL (ref 150–200)
HDLC SERPL-MCNC: 59 MG/DL (ref 40–59)
LDLC SERPL CALC-MCNC: 103 MG/DL
LDLC/HDLC SERPL: 1.71 {RATIO} (ref 0–3.22)
TRIGL SERPL-MCNC: 90 MG/DL
VLDLC SERPL-MCNC: 16 MG/DL (ref 5–40)

## 2022-08-05 PROCEDURE — 82607 VITAMIN B-12: CPT | Performed by: NURSE PRACTITIONER

## 2022-08-05 PROCEDURE — 36415 COLL VENOUS BLD VENIPUNCTURE: CPT | Performed by: NURSE PRACTITIONER

## 2022-08-05 PROCEDURE — 80061 LIPID PANEL: CPT | Performed by: NURSE PRACTITIONER

## 2022-08-05 PROCEDURE — 82043 UR ALBUMIN QUANTITATIVE: CPT | Performed by: NURSE PRACTITIONER

## 2022-08-05 PROCEDURE — 82306 VITAMIN D 25 HYDROXY: CPT | Performed by: NURSE PRACTITIONER

## 2022-08-05 PROCEDURE — 82570 ASSAY OF URINE CREATININE: CPT | Performed by: NURSE PRACTITIONER

## 2022-08-06 LAB
25(OH)D3 SERPL-MCNC: >200 NG/ML (ref 30–100)
ALBUMIN UR-MCNC: <1.2 MG/DL
CREAT UR-MCNC: 67.7 MG/DL
MICROALBUMIN/CREAT UR: NORMAL MG/G{CREAT}
VIT B12 BLD-MCNC: >2000 PG/ML (ref 211–946)

## 2022-08-09 DIAGNOSIS — E67.3 HIGH VITAMIN D LEVEL: ICD-10-CM

## 2022-08-09 DIAGNOSIS — E11.65 TYPE 2 DIABETES MELLITUS WITH HYPERGLYCEMIA, WITH LONG-TERM CURRENT USE OF INSULIN: Primary | ICD-10-CM

## 2022-08-09 DIAGNOSIS — Z79.4 TYPE 2 DIABETES MELLITUS WITH HYPERGLYCEMIA, WITH LONG-TERM CURRENT USE OF INSULIN: Primary | ICD-10-CM

## 2022-08-09 DIAGNOSIS — R79.89 HIGH SERUM VITAMIN B12: ICD-10-CM

## 2022-09-14 DIAGNOSIS — E11.65 TYPE 2 DIABETES MELLITUS WITH HYPERGLYCEMIA, WITHOUT LONG-TERM CURRENT USE OF INSULIN: Chronic | ICD-10-CM

## 2022-09-14 RX ORDER — CALCIUM CITRATE/VITAMIN D3 200MG-6.25
TABLET ORAL
Qty: 100 EACH | Refills: 5 | Status: SHIPPED | OUTPATIENT
Start: 2022-09-14

## 2022-10-17 RX ORDER — PEN NEEDLE, DIABETIC 31 GX5/16"
NEEDLE, DISPOSABLE MISCELLANEOUS
Qty: 100 EACH | Refills: 4 | Status: SHIPPED | OUTPATIENT
Start: 2022-10-17

## 2022-10-27 DIAGNOSIS — E11.65 TYPE 2 DIABETES MELLITUS WITH HYPERGLYCEMIA, WITHOUT LONG-TERM CURRENT USE OF INSULIN: Primary | ICD-10-CM

## 2022-10-28 ENCOUNTER — LAB (OUTPATIENT)
Dept: LAB | Facility: OTHER | Age: 67
End: 2022-10-28

## 2022-10-28 DIAGNOSIS — E67.3 HIGH VITAMIN D LEVEL: ICD-10-CM

## 2022-10-28 DIAGNOSIS — E11.65 TYPE 2 DIABETES MELLITUS WITH HYPERGLYCEMIA, WITHOUT LONG-TERM CURRENT USE OF INSULIN: ICD-10-CM

## 2022-10-28 DIAGNOSIS — Z79.4 TYPE 2 DIABETES MELLITUS WITH HYPERGLYCEMIA, WITH LONG-TERM CURRENT USE OF INSULIN: ICD-10-CM

## 2022-10-28 DIAGNOSIS — E11.65 TYPE 2 DIABETES MELLITUS WITH HYPERGLYCEMIA, WITH LONG-TERM CURRENT USE OF INSULIN: ICD-10-CM

## 2022-10-28 DIAGNOSIS — R79.89 HIGH SERUM VITAMIN B12: ICD-10-CM

## 2022-10-28 LAB
ALBUMIN SERPL-MCNC: 4.1 G/DL (ref 3.5–5)
ALBUMIN/GLOB SERPL: 1.4 G/DL (ref 1.1–1.8)
ALP SERPL-CCNC: 58 U/L (ref 38–126)
ALT SERPL W P-5'-P-CCNC: 19 U/L
ANION GAP SERPL CALCULATED.3IONS-SCNC: 5 MMOL/L (ref 5–15)
AST SERPL-CCNC: 28 U/L (ref 14–36)
BACTERIA UR QL AUTO: ABNORMAL /HPF
BASOPHILS # BLD AUTO: 0.02 10*3/MM3 (ref 0–0.2)
BASOPHILS NFR BLD AUTO: 0.3 % (ref 0–1.5)
BILIRUB SERPL-MCNC: 0.7 MG/DL (ref 0.2–1.3)
BILIRUB UR QL STRIP: NEGATIVE
BUN SERPL-MCNC: 30 MG/DL (ref 7–23)
BUN/CREAT SERPL: 21.9 (ref 7–25)
CALCIUM SPEC-SCNC: 9.9 MG/DL (ref 8.4–10.2)
CHLORIDE SERPL-SCNC: 103 MMOL/L (ref 101–112)
CLARITY UR: ABNORMAL
CO2 SERPL-SCNC: 28 MMOL/L (ref 22–30)
COLOR UR: ABNORMAL
CREAT SERPL-MCNC: 1.37 MG/DL (ref 0.52–1.04)
DEPRECATED RDW RBC AUTO: 41.9 FL (ref 37–54)
EGFRCR SERPLBLD CKD-EPI 2021: 42.1 ML/MIN/1.73
EOSINOPHIL # BLD AUTO: 0.11 10*3/MM3 (ref 0–0.4)
EOSINOPHIL NFR BLD AUTO: 1.7 % (ref 0.3–6.2)
ERYTHROCYTE [DISTWIDTH] IN BLOOD BY AUTOMATED COUNT: 12.7 % (ref 12.3–15.4)
GLOBULIN UR ELPH-MCNC: 2.9 GM/DL (ref 2.3–3.5)
GLUCOSE SERPL-MCNC: 122 MG/DL (ref 70–99)
GLUCOSE UR STRIP-MCNC: NEGATIVE MG/DL
GRAN CASTS URNS QL MICRO: ABNORMAL /LPF
HCT VFR BLD AUTO: 35.7 % (ref 34–46.6)
HGB BLD-MCNC: 11.4 G/DL (ref 12–15.9)
HGB UR QL STRIP.AUTO: ABNORMAL
HYALINE CASTS UR QL AUTO: ABNORMAL /LPF
KETONES UR QL STRIP: NEGATIVE
LEUKOCYTE ESTERASE UR QL STRIP.AUTO: ABNORMAL
LYMPHOCYTES # BLD AUTO: 2 10*3/MM3 (ref 0.7–3.1)
LYMPHOCYTES NFR BLD AUTO: 31.4 % (ref 19.6–45.3)
MCH RBC QN AUTO: 29.7 PG (ref 26.6–33)
MCHC RBC AUTO-ENTMCNC: 31.9 G/DL (ref 31.5–35.7)
MCV RBC AUTO: 93 FL (ref 79–97)
MONOCYTES # BLD AUTO: 0.55 10*3/MM3 (ref 0.1–0.9)
MONOCYTES NFR BLD AUTO: 8.6 % (ref 5–12)
NEUTROPHILS NFR BLD AUTO: 3.68 10*3/MM3 (ref 1.7–7)
NEUTROPHILS NFR BLD AUTO: 58 % (ref 42.7–76)
NITRITE UR QL STRIP: NEGATIVE
PH UR STRIP.AUTO: 5.5 [PH] (ref 5.5–8)
PLATELET # BLD AUTO: 240 10*3/MM3 (ref 140–450)
PMV BLD AUTO: 9.2 FL (ref 6–12)
POTASSIUM SERPL-SCNC: 4.7 MMOL/L (ref 3.4–5)
PROT SERPL-MCNC: 7 G/DL (ref 6.3–8.6)
PROT UR QL STRIP: NEGATIVE
RBC # BLD AUTO: 3.84 10*6/MM3 (ref 3.77–5.28)
RBC # UR STRIP: ABNORMAL /HPF
REF LAB TEST METHOD: ABNORMAL
SODIUM SERPL-SCNC: 136 MMOL/L (ref 137–145)
SP GR UR STRIP: 1.01 (ref 1–1.03)
SQUAMOUS #/AREA URNS HPF: ABNORMAL /HPF
UROBILINOGEN UR QL STRIP: ABNORMAL
WBC # UR STRIP: ABNORMAL /HPF
WBC NRBC COR # BLD: 6.36 10*3/MM3 (ref 3.4–10.8)

## 2022-10-28 PROCEDURE — 82607 VITAMIN B-12: CPT | Performed by: NURSE PRACTITIONER

## 2022-10-28 PROCEDURE — 36415 COLL VENOUS BLD VENIPUNCTURE: CPT | Performed by: NURSE PRACTITIONER

## 2022-10-28 PROCEDURE — 82306 VITAMIN D 25 HYDROXY: CPT | Performed by: NURSE PRACTITIONER

## 2022-10-28 PROCEDURE — 85025 COMPLETE CBC W/AUTO DIFF WBC: CPT | Performed by: NURSE PRACTITIONER

## 2022-10-28 PROCEDURE — 80053 COMPREHEN METABOLIC PANEL: CPT | Performed by: NURSE PRACTITIONER

## 2022-10-28 PROCEDURE — 81001 URINALYSIS AUTO W/SCOPE: CPT | Performed by: NURSE PRACTITIONER

## 2022-10-28 PROCEDURE — 83036 HEMOGLOBIN GLYCOSYLATED A1C: CPT | Performed by: NURSE PRACTITIONER

## 2022-10-28 PROCEDURE — 87147 CULTURE TYPE IMMUNOLOGIC: CPT | Performed by: NURSE PRACTITIONER

## 2022-10-28 PROCEDURE — 87086 URINE CULTURE/COLONY COUNT: CPT | Performed by: NURSE PRACTITIONER

## 2022-10-29 LAB
25(OH)D3 SERPL-MCNC: 45.4 NG/ML (ref 30–100)
BACTERIA SPEC AEROBE CULT: ABNORMAL
HBA1C MFR BLD: 8.2 % (ref 4.8–5.6)
VIT B12 BLD-MCNC: 649 PG/ML (ref 211–946)

## 2022-10-31 ENCOUNTER — OFFICE VISIT (OUTPATIENT)
Dept: FAMILY MEDICINE CLINIC | Facility: CLINIC | Age: 67
End: 2022-10-31

## 2022-10-31 VITALS
TEMPERATURE: 98.5 F | HEART RATE: 71 BPM | OXYGEN SATURATION: 97 % | DIASTOLIC BLOOD PRESSURE: 68 MMHG | SYSTOLIC BLOOD PRESSURE: 118 MMHG | BODY MASS INDEX: 20.26 KG/M2 | HEIGHT: 65 IN | WEIGHT: 121.6 LBS

## 2022-10-31 DIAGNOSIS — M81.0 AGE RELATED OSTEOPOROSIS, UNSPECIFIED PATHOLOGICAL FRACTURE PRESENCE: Chronic | ICD-10-CM

## 2022-10-31 DIAGNOSIS — F41.9 ANXIETY: Chronic | ICD-10-CM

## 2022-10-31 DIAGNOSIS — M19.90 ARTHRITIS: Chronic | ICD-10-CM

## 2022-10-31 DIAGNOSIS — E78.2 MIXED HYPERLIPIDEMIA: Chronic | ICD-10-CM

## 2022-10-31 DIAGNOSIS — E11.65 TYPE 2 DIABETES MELLITUS WITH HYPERGLYCEMIA, WITHOUT LONG-TERM CURRENT USE OF INSULIN: Primary | Chronic | ICD-10-CM

## 2022-10-31 DIAGNOSIS — I10 ESSENTIAL HYPERTENSION: Chronic | ICD-10-CM

## 2022-10-31 DIAGNOSIS — F32.A DEPRESSION, UNSPECIFIED DEPRESSION TYPE: Chronic | ICD-10-CM

## 2022-10-31 PROCEDURE — 99214 OFFICE O/P EST MOD 30 MIN: CPT | Performed by: NURSE PRACTITIONER

## 2022-10-31 RX ORDER — INSULIN DEGLUDEC INJECTION 100 U/ML
INJECTION, SOLUTION SUBCUTANEOUS
COMMUNITY
Start: 2022-08-01 | End: 2023-01-09 | Stop reason: SDUPTHER

## 2022-10-31 NOTE — PROGRESS NOTES
Chief Complaint  Diabetes (3 month follow up) and Osteoporosis (Question about prolia)    Subjective        Odessa Walker presents to [facility]     History of Present Illness     The patient presents today for recheck of hypertension, hyperlipidemia, diabetes mellitus, anxiety, and depression. She is accompanied by her sister today. She had labs recently dated 10/28/2022 and is here for those results. She is needing refills on Tradjenta today.    The patient reports she is still checking her blood glucose once daily. She adds notes that her blood glucose the morning of 10/31/2022 was 112 mg/dL; however, it was 99 mg/dL on 10/29/2022 and 98 mg/dL on 10/30/2022. She reports that her blood glucoses have been in the 90s mg/dL for the past 7 days. She mentions that she has been completely off of her vitamin B12 and vitamin D for the past 3 months. She confirms that her last visit with Dr. Kapadia was in 09/2022 or in 08/2022. She confirms that she has been following up with him every 3 months. She admits that her increased hemoglobin A1c is due to her diet she believes, eating more than she probably should.    The patient confirms that she needed a refill on her Tradjenta. She states that she is turning her Ozempic pen all the way around. She notes that she does the Tresiba at 10 units every night.    The patient inquires regarding her bone injection as she has not received it yet. According to her sister, the patient is overdue her Prolia injection. The patient reports that the pharmacy has not sent it to her yet.    The patient further confirms that she is doing Tresiba nightly and Ozempic weekly.    The patient adds that her energy level has improved better than what it used to be.    The patient reports that she has been forgetting to take her Tradjenta tablets. She adds that she has been taking half a tablet of the Tradjenta and it made her feel better.    The  "patient reports that she already had her influenza injection and mentions that she had a sore arm from it.    The patient reports that is trying to eat well; however, she states that watching what is eats is another thing.    The patient adds that she has been sleeping better at night. She mentions that she changed something on her medications. She reports that she is taking the generic brand of Tylenol PM, which helps her sleep better at night and helps with her pain as well. She states that her left leg bothers her more especially her left knee and that she has just been putting it off; however, she states that she desires her blood glucoses to be under control first.    The patient confirms that her mammogram and colonoscopy are both up-to-date.    The patient notes that she that she woke up with an upset stomach the night of 10/30/2022 after having the Ozempic injection and took the half tablet of Tradjenta, which she states made her feel better.    The following portions of the patient's history were reviewed and updated as appropriate: allergies, current medications, past family history, past medical history, past social history, past surgical history and problem list.    Objective   Vital Signs:  /68   Pulse 71   Temp 98.5 °F (36.9 °C)   Ht 165.1 cm (65\")   Wt 55.2 kg (121 lb 9.6 oz)   SpO2 97%   BMI 20.24 kg/m²   Estimated body mass index is 20.24 kg/m² as calculated from the following:    Height as of this encounter: 165.1 cm (65\").    Weight as of this encounter: 55.2 kg (121 lb 9.6 oz).    BMI is within normal parameters. No other follow-up for BMI required.    Physical Exam  Vitals and nursing note reviewed.   Constitutional:       General: She is not in acute distress.     Appearance: Normal appearance. She is not ill-appearing, toxic-appearing or diaphoretic.   HENT:      Head: Normocephalic and atraumatic.   Cardiovascular:      Rate and Rhythm: Normal rate and regular rhythm.      Heart " sounds: Normal heart sounds. No murmur heard.    No friction rub. No gallop.   Pulmonary:      Effort: Pulmonary effort is normal. No respiratory distress.      Breath sounds: Normal breath sounds. No stridor. No wheezing, rhonchi or rales.   Musculoskeletal:      Right lower leg: No edema.      Left lower leg: No edema.      Comments: Lower extremities with PT pulses palpable and no edema. JACKI hoses are on bilaterally.   Skin:     General: Skin is warm and dry.      Coloration: Skin is not jaundiced or pale.      Findings: No bruising, erythema, lesion or rash.   Neurological:      Mental Status: She is alert and oriented to person, place, and time.      Cranial Nerves: No cranial nerve deficit.      Motor: No weakness.      Coordination: Coordination normal.      Gait: Gait normal.   Psychiatric:         Mood and Affect: Mood normal.         Behavior: Behavior normal.         Thought Content: Thought content normal.         Judgment: Judgment normal.          Result Review :       Latest Reference Range & Units 10/28/22 10:30   Glucose 70 - 99 mg/dL 122 (H)   Sodium 137 - 145 mmol/L 136 (L)   Potassium 3.4 - 5.0 mmol/L 4.7   CO2 22.0 - 30.0 mmol/L 28.0   Chloride 101 - 112 mmol/L 103   Anion Gap 5.0 - 15.0 mmol/L 5.0   Creatinine 0.52 - 1.04 mg/dL 1.37 (H)   BUN 7 - 23 mg/dL 30 (H)   BUN/Creatinine Ratio 7.0 - 25.0  21.9   Calcium 8.4 - 10.2 mg/dL 9.9   eGFR >60.0 mL/min/1.73 42.1 (L)   Alkaline Phosphatase 38 - 126 U/L 58   Total Protein 6.3 - 8.6 g/dL 7.0   ALT (SGPT) <=35 U/L 19   AST (SGOT) 14 - 36 U/L 28   Total Bilirubin 0.2 - 1.3 mg/dL 0.7   Albumin 3.50 - 5.00 g/dL 4.10   Globulin 2.3 - 3.5 gm/dL 2.9   A/G Ratio 1.1 - 1.8 g/dL 1.4   Hemoglobin A1C 4.80 - 5.60 % 8.20 (H)   Vitamin B-12 211 - 946 pg/mL 649   25 Hydroxy, Vitamin D 30.0 - 100.0 ng/ml 45.4   WBC 3.40 - 10.80 10*3/mm3 6.36   RBC 3.77 - 5.28 10*6/mm3 3.84   Hemoglobin 12.0 - 15.9 g/dL 11.4 (L)   Hematocrit 34.0 - 46.6 % 35.7   RDW 12.3 - 15.4 %  12.7   MCV 79.0 - 97.0 fL 93.0   MCH 26.6 - 33.0 pg 29.7   MCHC 31.5 - 35.7 g/dL 31.9   MPV 6.0 - 12.0 fL 9.2   Platelets 140 - 450 10*3/mm3 240   RDW-SD 37.0 - 54.0 fl 41.9   Neutrophil Rel % 42.7 - 76.0 % 58.0   Lymphocyte Rel % 19.6 - 45.3 % 31.4   Monocyte Rel % 5.0 - 12.0 % 8.6   Eosinophil Rel % 0.3 - 6.2 % 1.7   Basophil Rel % 0.0 - 1.5 % 0.3   Neutrophils Absolute 1.70 - 7.00 10*3/mm3 3.68   Lymphocytes Absolute 0.70 - 3.10 10*3/mm3 2.00   Monocytes Absolute 0.10 - 0.90 10*3/mm3 0.55   Eosinophils Absolute 0.00 - 0.40 10*3/mm3 0.11   Basophils Absolute 0.00 - 0.20 10*3/mm3 0.02   Color, UA Yellow, Straw  Dark Yellow !   Appearance, UA Clear  Cloudy !   Specific Gravity, UA 1.005 - 1.030  1.015   pH, UA 5.5 - 8.0  5.5   Glucose Negative  Negative   Ketones, UA Negative  Negative   Blood, UA Negative  Trace !   Nitrite, UA Negative  Negative   Leukocytes, UA Negative  Small (1+) !   Protein, UA Negative  Negative   Bilirubin, UA Negative  Negative   Urobilinogen, UA 0.2 - 1.0 E.U./dL  0.2 E.U./dL   RBC, UA None Seen /HPF 3-5 !   WBC, UA None Seen /HPF 6-12 !   Bacteria, UA None Seen /HPF Trace !   Squamous Epithelial Cells, UA None Seen, 0-2 /HPF 3-6 !   Hyaline Casts, UA None Seen /LPF 3-6   Granular Casts, UA None Seen /LPF 0-2   Methodology:  Manual Light Microscopy   URINE CULTURE  Rpt !   (H): Data is abnormally high  (L): Data is abnormally low  !: Data is abnormal  Rpt: View report in Results Review for more information              Assessment and Plan   Diagnoses and all orders for this visit:    1. Type 2 diabetes mellitus with hyperglycemia, without long-term current use of insulin (HCC) (Primary)  -     linagliptin (Tradjenta) 5 MG tablet tablet; Take 1 tablet by mouth Daily.  Dispense: 30 tablet; Refill: 5    2. Essential hypertension    3. Mixed hyperlipidemia    4. Anxiety    5. Depression, unspecified depression type    6. Age related osteoporosis, unspecified pathological fracture  presence    7. Arthritis      Lab results are discussed with her, and copies are given to her. I will increase Tresiba to 15 units daily since A1c is not controlled. She is encouraged to follow a better diabetic diet, monitor her blood sugar closely, and with any problems, call me back. Otherwise, I will see her back in 3 months. She was given refills on Tradjenta today. She will continue to see Nephrology as scheduled every 3 months. BUN and creatinine are slightly improved from labs 1 month ago, but still elevated. We will call [facility] and check on her Prolia prescription. She states that the last time she received the medication from [facility] and then came here to get her shot, but she has not yet received the refill or the medicine from the pharmacy. We will call and again check on this and let her know, give refill if needed. She has already had flu shot. All questions and concerns are addressed with understanding noted. The patient is in agreement to above plan as is her sister.    I spent 30 minutes caring for Odessa on this date of service. This time includes time spent by me in the following activities: preparing for the visit, reviewing tests, performing a medically appropriate examination and/or evaluation, counseling and educating the patient/family/caregiver, ordering medications, tests, or procedures and documenting information in the medical record     I spent 44 minutes caring for Odessa on this date of service. This time includes time spent by me in the following activities:preparing for the visit, reviewing tests, performing a medically appropriate examination and/or evaluation , counseling and educating the patient/family/caregiver, ordering medications, tests, or procedures and documenting information in the medical record  Follow Up   Return in about 3 months (around 1/31/2023), or if symptoms worsen or fail to improve, for Recheck, or sooner as needed.  Patient was given  instructions and counseling regarding her condition or for health maintenance advice. Please see specific information pulled into the AVS if appropriate.       Transcribed from ambient dictation for SIDDHARTH Jim by Dana Resendiz.  10/31/22   15:25 CDT    Patient or patient representative verbalized consent to the visit recording.  I have personally performed the services described in this document as transcribed by the above individual, and it is both accurate and complete.  SIDDHARTH Jim  10/31/2022  17:00 CDT

## 2022-11-10 ENCOUNTER — CLINICAL SUPPORT (OUTPATIENT)
Dept: FAMILY MEDICINE CLINIC | Facility: CLINIC | Age: 67
End: 2022-11-10

## 2022-11-10 DIAGNOSIS — M81.0 OSTEOPOROSIS, UNSPECIFIED OSTEOPOROSIS TYPE, UNSPECIFIED PATHOLOGICAL FRACTURE PRESENCE: Primary | ICD-10-CM

## 2022-11-10 PROCEDURE — 96372 THER/PROPH/DIAG INJ SC/IM: CPT | Performed by: NURSE PRACTITIONER

## 2022-12-19 RX ORDER — SEMAGLUTIDE 1.34 MG/ML
INJECTION, SOLUTION SUBCUTANEOUS
Qty: 1.5 ML | Refills: 5 | Status: SHIPPED | OUTPATIENT
Start: 2022-12-19 | End: 2023-04-06 | Stop reason: RX

## 2023-01-05 RX ORDER — LISINOPRIL 40 MG/1
TABLET ORAL
Qty: 15 TABLET | Refills: 5 | Status: SHIPPED | OUTPATIENT
Start: 2023-01-05 | End: 2023-01-31 | Stop reason: SINTOL

## 2023-01-09 DIAGNOSIS — E11.65 TYPE 2 DIABETES MELLITUS WITH HYPERGLYCEMIA, WITHOUT LONG-TERM CURRENT USE OF INSULIN: Primary | ICD-10-CM

## 2023-01-09 DIAGNOSIS — I10 ESSENTIAL HYPERTENSION: ICD-10-CM

## 2023-01-09 DIAGNOSIS — E67.3 HIGH VITAMIN D LEVEL: ICD-10-CM

## 2023-01-09 DIAGNOSIS — E78.2 MIXED HYPERLIPIDEMIA: ICD-10-CM

## 2023-01-09 DIAGNOSIS — R79.89 HIGH SERUM VITAMIN B12: ICD-10-CM

## 2023-01-09 DIAGNOSIS — E11.65 TYPE 2 DIABETES MELLITUS WITH HYPERGLYCEMIA, WITHOUT LONG-TERM CURRENT USE OF INSULIN: Chronic | ICD-10-CM

## 2023-01-09 RX ORDER — INSULIN DEGLUDEC INJECTION 100 U/ML
INJECTION, SOLUTION SUBCUTANEOUS
Qty: 3 ML | Refills: 1 | Status: SHIPPED | OUTPATIENT
Start: 2023-01-09

## 2023-01-19 ENCOUNTER — LAB (OUTPATIENT)
Dept: LAB | Facility: OTHER | Age: 68
End: 2023-01-19
Payer: MEDICARE

## 2023-01-19 DIAGNOSIS — E78.2 MIXED HYPERLIPIDEMIA: ICD-10-CM

## 2023-01-19 DIAGNOSIS — E67.3 HIGH VITAMIN D LEVEL: ICD-10-CM

## 2023-01-19 DIAGNOSIS — E11.65 TYPE 2 DIABETES MELLITUS WITH HYPERGLYCEMIA, WITHOUT LONG-TERM CURRENT USE OF INSULIN: ICD-10-CM

## 2023-01-19 DIAGNOSIS — R79.89 HIGH SERUM VITAMIN B12: ICD-10-CM

## 2023-01-19 DIAGNOSIS — I10 ESSENTIAL HYPERTENSION: ICD-10-CM

## 2023-01-19 LAB
ALBUMIN SERPL-MCNC: 4.2 G/DL (ref 3.5–5)
ALBUMIN/GLOB SERPL: 1.3 G/DL (ref 1.1–1.8)
ALP SERPL-CCNC: 44 U/L (ref 38–126)
ALT SERPL W P-5'-P-CCNC: 23 U/L
ANION GAP SERPL CALCULATED.3IONS-SCNC: 7 MMOL/L (ref 5–15)
AST SERPL-CCNC: 27 U/L (ref 14–36)
BACTERIA UR QL AUTO: ABNORMAL /HPF
BASOPHILS # BLD AUTO: 0.03 10*3/MM3 (ref 0–0.2)
BASOPHILS NFR BLD AUTO: 0.6 % (ref 0–1.5)
BILIRUB SERPL-MCNC: 0.7 MG/DL (ref 0.2–1.3)
BILIRUB UR QL STRIP: NEGATIVE
BUN SERPL-MCNC: 22 MG/DL (ref 7–23)
BUN/CREAT SERPL: 17.1 (ref 7–25)
CALCIUM SPEC-SCNC: 9.1 MG/DL (ref 8.4–10.2)
CHLORIDE SERPL-SCNC: 102 MMOL/L (ref 101–112)
CHOLEST SERPL-MCNC: 192 MG/DL (ref 150–200)
CLARITY UR: ABNORMAL
CO2 SERPL-SCNC: 29 MMOL/L (ref 22–30)
COLOR UR: ABNORMAL
CREAT SERPL-MCNC: 1.29 MG/DL (ref 0.52–1.04)
DEPRECATED RDW RBC AUTO: 40.5 FL (ref 37–54)
EGFRCR SERPLBLD CKD-EPI 2021: 45.3 ML/MIN/1.73
EOSINOPHIL # BLD AUTO: 0.07 10*3/MM3 (ref 0–0.4)
EOSINOPHIL NFR BLD AUTO: 1.4 % (ref 0.3–6.2)
ERYTHROCYTE [DISTWIDTH] IN BLOOD BY AUTOMATED COUNT: 12.4 % (ref 12.3–15.4)
GLOBULIN UR ELPH-MCNC: 3.3 GM/DL (ref 2.3–3.5)
GLUCOSE SERPL-MCNC: 89 MG/DL (ref 70–99)
GLUCOSE UR STRIP-MCNC: NEGATIVE MG/DL
HCT VFR BLD AUTO: 35.2 % (ref 34–46.6)
HDLC SERPL-MCNC: 63 MG/DL (ref 40–59)
HGB BLD-MCNC: 11.9 G/DL (ref 12–15.9)
HGB UR QL STRIP.AUTO: ABNORMAL
HYALINE CASTS UR QL AUTO: ABNORMAL /LPF
KETONES UR QL STRIP: NEGATIVE
LDLC SERPL CALC-MCNC: 113 MG/DL
LDLC/HDLC SERPL: 1.77 {RATIO} (ref 0–3.22)
LEUKOCYTE ESTERASE UR QL STRIP.AUTO: ABNORMAL
LYMPHOCYTES # BLD AUTO: 1.37 10*3/MM3 (ref 0.7–3.1)
LYMPHOCYTES NFR BLD AUTO: 27 % (ref 19.6–45.3)
MCH RBC QN AUTO: 30.5 PG (ref 26.6–33)
MCHC RBC AUTO-ENTMCNC: 33.8 G/DL (ref 31.5–35.7)
MCV RBC AUTO: 90.3 FL (ref 79–97)
MONOCYTES # BLD AUTO: 0.52 10*3/MM3 (ref 0.1–0.9)
MONOCYTES NFR BLD AUTO: 10.2 % (ref 5–12)
NEUTROPHILS NFR BLD AUTO: 3.09 10*3/MM3 (ref 1.7–7)
NEUTROPHILS NFR BLD AUTO: 60.8 % (ref 42.7–76)
NITRITE UR QL STRIP: POSITIVE
PH UR STRIP.AUTO: 6.5 [PH] (ref 5.5–8)
PLATELET # BLD AUTO: 214 10*3/MM3 (ref 140–450)
PMV BLD AUTO: 9 FL (ref 6–12)
POTASSIUM SERPL-SCNC: 4.7 MMOL/L (ref 3.4–5)
PROT SERPL-MCNC: 7.5 G/DL (ref 6.3–8.6)
PROT UR QL STRIP: NEGATIVE
RBC # BLD AUTO: 3.9 10*6/MM3 (ref 3.77–5.28)
RBC # UR STRIP: ABNORMAL /HPF
REF LAB TEST METHOD: ABNORMAL
SODIUM SERPL-SCNC: 138 MMOL/L (ref 137–145)
SP GR UR STRIP: 1.02 (ref 1–1.03)
SQUAMOUS #/AREA URNS HPF: ABNORMAL /HPF
TRIGL SERPL-MCNC: 88 MG/DL
UROBILINOGEN UR QL STRIP: ABNORMAL
VLDLC SERPL-MCNC: 16 MG/DL (ref 5–40)
WBC # UR STRIP: ABNORMAL /HPF
WBC NRBC COR # BLD: 5.08 10*3/MM3 (ref 3.4–10.8)

## 2023-01-19 PROCEDURE — 81001 URINALYSIS AUTO W/SCOPE: CPT | Performed by: NURSE PRACTITIONER

## 2023-01-19 PROCEDURE — 85025 COMPLETE CBC W/AUTO DIFF WBC: CPT | Performed by: NURSE PRACTITIONER

## 2023-01-19 PROCEDURE — 80061 LIPID PANEL: CPT | Performed by: NURSE PRACTITIONER

## 2023-01-19 PROCEDURE — 87186 SC STD MICRODIL/AGAR DIL: CPT | Performed by: NURSE PRACTITIONER

## 2023-01-19 PROCEDURE — 82306 VITAMIN D 25 HYDROXY: CPT | Performed by: NURSE PRACTITIONER

## 2023-01-19 PROCEDURE — 87077 CULTURE AEROBIC IDENTIFY: CPT | Performed by: NURSE PRACTITIONER

## 2023-01-19 PROCEDURE — 84481 FREE ASSAY (FT-3): CPT | Performed by: NURSE PRACTITIONER

## 2023-01-19 PROCEDURE — 84443 ASSAY THYROID STIM HORMONE: CPT | Performed by: NURSE PRACTITIONER

## 2023-01-19 PROCEDURE — 83036 HEMOGLOBIN GLYCOSYLATED A1C: CPT | Performed by: NURSE PRACTITIONER

## 2023-01-19 PROCEDURE — 36415 COLL VENOUS BLD VENIPUNCTURE: CPT | Performed by: NURSE PRACTITIONER

## 2023-01-19 PROCEDURE — 80053 COMPREHEN METABOLIC PANEL: CPT | Performed by: NURSE PRACTITIONER

## 2023-01-19 PROCEDURE — 84436 ASSAY OF TOTAL THYROXINE: CPT | Performed by: NURSE PRACTITIONER

## 2023-01-19 PROCEDURE — 87086 URINE CULTURE/COLONY COUNT: CPT | Performed by: NURSE PRACTITIONER

## 2023-01-19 PROCEDURE — 82607 VITAMIN B-12: CPT | Performed by: NURSE PRACTITIONER

## 2023-01-20 LAB
25(OH)D3 SERPL-MCNC: 37.3 NG/ML (ref 30–100)
HBA1C MFR BLD: 7.8 % (ref 4.8–5.6)
T3FREE SERPL-MCNC: 2.69 PG/ML (ref 2–4.4)
T4 SERPL-MCNC: 7.56 MCG/DL (ref 4.5–11.7)
TSH SERPL DL<=0.05 MIU/L-ACNC: 2.31 UIU/ML (ref 0.27–4.2)
VIT B12 BLD-MCNC: 668 PG/ML (ref 211–946)

## 2023-01-22 LAB — BACTERIA SPEC AEROBE CULT: ABNORMAL

## 2023-01-23 RX ORDER — LEVOFLOXACIN 500 MG/1
500 TABLET, FILM COATED ORAL DAILY
Qty: 7 TABLET | Refills: 0 | Status: SHIPPED | OUTPATIENT
Start: 2023-01-23

## 2023-01-31 ENCOUNTER — OFFICE VISIT (OUTPATIENT)
Dept: FAMILY MEDICINE CLINIC | Facility: CLINIC | Age: 68
End: 2023-01-31
Payer: MEDICARE

## 2023-01-31 VITALS — HEIGHT: 65 IN | WEIGHT: 121 LBS | BODY MASS INDEX: 20.16 KG/M2

## 2023-01-31 DIAGNOSIS — Z00.00 MEDICARE ANNUAL WELLNESS VISIT, SUBSEQUENT: Primary | ICD-10-CM

## 2023-01-31 PROCEDURE — 1126F AMNT PAIN NOTED NONE PRSNT: CPT | Performed by: NURSE PRACTITIONER

## 2023-01-31 PROCEDURE — G0439 PPPS, SUBSEQ VISIT: HCPCS | Performed by: NURSE PRACTITIONER

## 2023-01-31 PROCEDURE — 1170F FXNL STATUS ASSESSED: CPT | Performed by: NURSE PRACTITIONER

## 2023-01-31 PROCEDURE — 1125F AMNT PAIN NOTED PAIN PRSNT: CPT | Performed by: NURSE PRACTITIONER

## 2023-01-31 PROCEDURE — 1160F RVW MEDS BY RX/DR IN RCRD: CPT | Performed by: NURSE PRACTITIONER

## 2023-01-31 PROCEDURE — 1159F MED LIST DOCD IN RCRD: CPT | Performed by: NURSE PRACTITIONER

## 2023-01-31 RX ORDER — MAGNESIUM OXIDE 400 MG/1
800 TABLET ORAL
COMMUNITY
Start: 2023-01-12 | End: 2024-01-13

## 2023-01-31 RX ORDER — RANOLAZINE 500 MG/1
500 TABLET, EXTENDED RELEASE ORAL 2 TIMES DAILY
Qty: 60 TABLET | Refills: 5 | Status: SHIPPED | OUTPATIENT
Start: 2023-01-31

## 2023-01-31 RX ORDER — ROSUVASTATIN CALCIUM 10 MG/1
10 TABLET, COATED ORAL DAILY
Qty: 30 TABLET | Refills: 5 | Status: SHIPPED | OUTPATIENT
Start: 2023-01-31

## 2023-01-31 NOTE — PROGRESS NOTES
The ABCs of the Annual Wellness Visit  Subsequent Medicare Wellness Visit    Subjective      Odessa Walker is a 67 y.o. female who presents for a Subsequent Medicare Wellness Visit.    The following portions of the patient's history were reviewed and   updated as appropriate: allergies, current medications, past family history, past medical history, past social history, past surgical history and problem list.    Compared to one year ago, the patient feels her physical   health is the same.    Compared to one year ago, the patient feels her mental   health is the same.    Recent Hospitalizations:  She was not admitted to the hospital during the last year.     Current Medical Providers:  Patient Care Team:  Kendal Mccoy APRN as PCP - General  Ge Elizondo DPM as Consulting Physician (Podiatry)    Outpatient Medications Prior to Visit   Medication Sig Dispense Refill   • aspirin 81 MG chewable tablet Chew 81 mg Daily.     • Calcium Carbonate (CALCIUM 600 PO) Take 1 tablet by mouth Daily.     • CBD oil (cannabidiol) capsule Take  by mouth Daily.     • Easy Touch Pen Needles 31G X 8 MM misc USE AS INSTRUCTED WITH VICTOZA 100 each 4   • Flax Oil-Fish Oil-Borage Oil (FISH OIL-FLAX OIL-BORAGE OIL PO) Take 1 tablet by mouth 2 (Two) Times a Day.     • fluticasone (FLONASE) 50 MCG/ACT nasal spray USE 2 SPRAYS IN EACH NOSTRIL DAILY 16 g 3   • Glucosamine HCl (GLUCOSAMINE PO) Take 1,000 mg by mouth.     • glucose monitor monitoring kit 1 each Daily. DX:E11.9 1 each 0   • glucose monitor monitoring kit 1 each Daily. DX E11.9 1 each 0   • IRON PO Take 65 mg by mouth Daily.     • Lancets misc Once daily 100 each 12   • levoFLOXacin (Levaquin) 500 MG tablet Take 1 tablet by mouth Daily. 7 tablet 0   • linagliptin (Tradjenta) 5 MG tablet tablet Take 1 tablet by mouth Daily. 30 tablet 5   • magnesium oxide (MAG-OX) 400 MG tablet Take 800 mg by mouth.     • magnesium oxide (MAGOX) 400 (241.3 Mg) MG tablet tablet Take 400 mg  by mouth 2 (Two) Times a Day.     • Multiple Vitamins-Minerals (ONE-A-DAY 50 PLUS PO) Take 1 tablet by mouth Daily.     • ONE TOUCH LANCETS misc 1 applicator Daily. 200 each 12   • Ozempic, 0.25 or 0.5 MG/DOSE, 2 MG/1.5ML solution pen-injector INJECT 0.5 MG UNDER THE SKIN INTO THE APPROPRIATE AREA AS DIRECTED 1 (ONE) TIME PER WEEK. *VERIFIED 0.5MG DOSE PER [facility] AT [facility] [place] 11<MORE> 1.5 mL 5   • sertraline (ZOLOFT) 50 MG tablet TAKE 1 TABLET BY MOUTH DAILY 30 tablet 3   • Tresiba FlexTouch 100 UNIT/ML solution pen-injector injection Inject 15 unit under the skin into the appropriate area as directed 3 mL 1   • True Metrix Blood Glucose Test test strip USE TO TEST BLOOD SUGAR ONCE DAILY 100 each 5   • lisinopril (PRINIVIL,ZESTRIL) 40 MG tablet TAKE 1/2 TABLET BY MOUTH DAILY 15 tablet 5   • ranolazine (RANEXA) 500 MG 12 hr tablet TAKE 1 TABLET BY MOUTH 2 (TWO) TIMES A DAY. 60 tablet 5   • rosuvastatin (CRESTOR) 10 MG tablet TAKE 1 TABLET BY MOUTH DAILY. **MD GUZMAN DRUG INTERACTION WITH RANEXA**10/27/20 HC 30 tablet 5     No facility-administered medications prior to visit.       No opioid medication identified on active medication list. I have reviewed chart for other potential  high risk medication/s and harmful drug interactions in the elderly.        Aspirin is on active medication list. Aspirin use is indicated based on review of current medical condition/s. Pros and cons of this therapy have been discussed today. Benefits of this medication outweigh potential harm.  Patient has been encouraged to continue taking this medication.  .    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  ACP discussion was held with the patient during this visit. Patient has an advance directive in EMR which is still valid.      Objective    Vitals:    01/31/23 1115   Weight: 54.9 kg (121 lb)   Height: 165.1 cm (65\")   PainSc: 0-No pain     Estimated body mass index is 20.14 kg/m² as calculated from the following:    Height as of this encounter: 165.1 cm (65\").    Weight as of this encounter: 54.9 kg (121 lb).    BMI is within normal parameters. No other follow-up for BMI required.      Does the patient have evidence of cognitive impairment?   No    Lab Results   Component Value Date     (H) 01/06/2023    TRIG 88 01/19/2023    HDL 63 (H) 01/19/2023     (H) 01/19/2023    VLDL 16 01/19/2023    HGBA1C 7.80 (H) 01/19/2023          HEALTH RISK ASSESSMENT    Smoking Status:  Social History     Tobacco Use   Smoking Status Former   • Packs/day: 0.50   • Years: 8.00   • Pack years: 4.00   • Types: Cigarettes   Smokeless Tobacco Never   Tobacco Comments    quit smoking >20 yr ago     Alcohol Consumption:  Social History     Substance and Sexual Activity   Alcohol Use No     Fall Risk Screen:    STEADI Fall Risk Assessment was completed, and patient is at MODERATE risk for falls. Assessment completed on:1/31/2023    Depression Screening:  PHQ-2/PHQ-9 Depression Screening 1/31/2023   Little Interest or Pleasure in Doing Things 0-->not at all   Feeling Down, Depressed or Hopeless 0-->not at all   PHQ-9: Brief Depression Severity Measure Score 0       Health Habits and Functional and Cognitive Screening:  Functional & Cognitive Status 1/31/2023   Do you have difficulty preparing food and eating? No   Do you have difficulty bathing " yourself, getting dressed or grooming yourself? No   Do you have difficulty using the toilet? No   Do you have difficulty moving around from place to place? No   Do you have trouble with steps or getting out of a bed or a chair? No   Current Diet Well Balanced Diet   Dental Exam Not up to date   Eye Exam Not up to date   Exercise (times per week) 2 times per week   Current Exercises Include House Cleaning   Current Exercise Activities Include -   Do you need help using the phone?  No   Are you deaf or do you have serious difficulty hearing?  Yes   Do you need help with transportation? No   Do you need help shopping? No   Do you need help preparing meals?  No   Do you need help with housework?  No   Do you need help with laundry? No   Do you need help taking your medications? No   Do you need help managing money? No   Do you ever drive or ride in a car without wearing a seat belt? No   Have you felt unusual stress, anger or loneliness in the last month? -   Who do you live with? Alone   If you need help, do you have trouble finding someone available to you? No   Have you been bothered in the last four weeks by sexual problems? No   Do you have difficulty concentrating, remembering or making decisions? Yes       Age-appropriate Screening Schedule:  Refer to the list below for future screening recommendations based on patient's age, sex and/or medical conditions. Orders for these recommended tests are listed in the plan section. The patient has been provided with a written plan.    Health Maintenance   Topic Date Due   • ZOSTER VACCINE (3 of 3) 05/17/2017   • DIABETIC EYE EXAM  06/05/2023 (Originally 12/13/2017)   • PAP SMEAR  09/29/2023 (Originally 12/6/2016)   • DIABETIC FOOT EXAM  05/17/2023   • HEMOGLOBIN A1C  07/19/2023   • DXA SCAN  08/04/2023   • URINE MICROALBUMIN  01/06/2024   • LIPID PANEL  01/19/2024   • MAMMOGRAM  03/09/2024   • TDAP/TD VACCINES (4 - Td or Tdap) 09/23/2032   • INFLUENZA VACCINE  Completed                 CMS Preventative Services Quick Reference  Risk Factors Identified During Encounter:    Fall Risk-High or Moderate: Discussed Fall Prevention in the home  Immunizations Discussed/Encouraged: Prevnar 20 (Pneumococcal 20-valent conjugate) and Shingrix  Polypharmacy: Medication List reviewed and Medications are appropriate for patient    The above risks/problems have been discussed with the patient.  Pertinent information has been shared with the patient in the After Visit Summary.    Diagnoses and all orders for this visit:    1. Medicare annual wellness visit, subsequent (Primary)    Other orders  -     rosuvastatin (CRESTOR) 10 MG tablet; Take 1 tablet by mouth Daily.  Dispense: 30 tablet; Refill: 5  -     ranolazine (RANEXA) 500 MG 12 hr tablet; Take 1 tablet by mouth 2 (Two) Times a Day.  Dispense: 60 tablet; Refill: 5      Follow Up:   Next Medicare Wellness visit to be scheduled in 1 year.      An After Visit Summary and PPPS were made available to the patient.

## 2023-04-06 RX ORDER — SEMAGLUTIDE 0.68 MG/ML
0.5 INJECTION, SOLUTION SUBCUTANEOUS WEEKLY
Qty: 3 ML | Refills: 4 | Status: SHIPPED | OUTPATIENT
Start: 2023-04-06

## 2023-05-01 DIAGNOSIS — Z82.49 FAMILY HISTORY OF EARLY CAD: ICD-10-CM

## 2023-05-01 DIAGNOSIS — E11.65 TYPE 2 DIABETES MELLITUS WITH HYPERGLYCEMIA, WITHOUT LONG-TERM CURRENT USE OF INSULIN: Primary | ICD-10-CM

## 2023-05-03 ENCOUNTER — LAB (OUTPATIENT)
Dept: LAB | Facility: OTHER | Age: 68
End: 2023-05-03
Payer: MEDICARE

## 2023-05-03 ENCOUNTER — OFFICE VISIT (OUTPATIENT)
Dept: FAMILY MEDICINE CLINIC | Facility: CLINIC | Age: 68
End: 2023-05-03
Payer: MEDICARE

## 2023-05-03 VITALS
HEIGHT: 65 IN | SYSTOLIC BLOOD PRESSURE: 120 MMHG | WEIGHT: 125 LBS | HEART RATE: 85 BPM | BODY MASS INDEX: 20.83 KG/M2 | OXYGEN SATURATION: 97 % | DIASTOLIC BLOOD PRESSURE: 78 MMHG

## 2023-05-03 DIAGNOSIS — L60.9 NAIL PROBLEM: ICD-10-CM

## 2023-05-03 DIAGNOSIS — Z23 IMMUNIZATION DUE: ICD-10-CM

## 2023-05-03 DIAGNOSIS — Z23 NEED FOR PNEUMOCOCCAL VACCINE: ICD-10-CM

## 2023-05-03 DIAGNOSIS — I10 ESSENTIAL HYPERTENSION: Chronic | ICD-10-CM

## 2023-05-03 DIAGNOSIS — E11.9 TYPE 2 DIABETES MELLITUS WITHOUT COMPLICATION, WITHOUT LONG-TERM CURRENT USE OF INSULIN: Primary | Chronic | ICD-10-CM

## 2023-05-03 DIAGNOSIS — I10 ESSENTIAL HYPERTENSION: ICD-10-CM

## 2023-05-03 DIAGNOSIS — E55.9 VITAMIN D DEFICIENCY, UNSPECIFIED: ICD-10-CM

## 2023-05-03 DIAGNOSIS — E11.9 TYPE 2 DIABETES MELLITUS WITHOUT COMPLICATION, WITHOUT LONG-TERM CURRENT USE OF INSULIN: ICD-10-CM

## 2023-05-03 DIAGNOSIS — E78.2 MIXED HYPERLIPIDEMIA: Chronic | ICD-10-CM

## 2023-05-03 DIAGNOSIS — B49 FUNGUS INFECTION: ICD-10-CM

## 2023-05-03 DIAGNOSIS — M19.042 OSTEOARTHRITIS OF BOTH HANDS, UNSPECIFIED OSTEOARTHRITIS TYPE: ICD-10-CM

## 2023-05-03 DIAGNOSIS — M19.041 OSTEOARTHRITIS OF BOTH HANDS, UNSPECIFIED OSTEOARTHRITIS TYPE: ICD-10-CM

## 2023-05-03 DIAGNOSIS — E78.2 MIXED HYPERLIPIDEMIA: ICD-10-CM

## 2023-05-03 LAB
ALBUMIN SERPL-MCNC: 4 G/DL (ref 3.5–5)
ALBUMIN/GLOB SERPL: 1.3 G/DL (ref 1.1–1.8)
ALP SERPL-CCNC: 39 U/L (ref 38–126)
ALT SERPL W P-5'-P-CCNC: 22 U/L
ANION GAP SERPL CALCULATED.3IONS-SCNC: 7 MMOL/L (ref 5–15)
AST SERPL-CCNC: 26 U/L (ref 14–36)
BASOPHILS # BLD AUTO: 0.02 10*3/MM3 (ref 0–0.2)
BASOPHILS NFR BLD AUTO: 0.3 % (ref 0–1.5)
BILIRUB SERPL-MCNC: 0.5 MG/DL (ref 0.2–1.3)
BUN SERPL-MCNC: 26 MG/DL (ref 7–23)
BUN/CREAT SERPL: 21 (ref 7–25)
CALCIUM SPEC-SCNC: 9.8 MG/DL (ref 8.4–10.2)
CHLORIDE SERPL-SCNC: 100 MMOL/L (ref 101–112)
CO2 SERPL-SCNC: 30 MMOL/L (ref 22–30)
CREAT SERPL-MCNC: 1.24 MG/DL (ref 0.52–1.04)
DEPRECATED RDW RBC AUTO: 44.2 FL (ref 37–54)
EGFRCR SERPLBLD CKD-EPI 2021: 47.2 ML/MIN/1.73
EOSINOPHIL # BLD AUTO: 0.13 10*3/MM3 (ref 0–0.4)
EOSINOPHIL NFR BLD AUTO: 2 % (ref 0.3–6.2)
ERYTHROCYTE [DISTWIDTH] IN BLOOD BY AUTOMATED COUNT: 13.2 % (ref 12.3–15.4)
ERYTHROCYTE [SEDIMENTATION RATE] IN BLOOD: 17 MM/HR (ref 0–20)
GLOBULIN UR ELPH-MCNC: 3 GM/DL (ref 2.3–3.5)
GLUCOSE SERPL-MCNC: 190 MG/DL (ref 70–99)
HCT VFR BLD AUTO: 35.8 % (ref 34–46.6)
HGB BLD-MCNC: 11.7 G/DL (ref 12–15.9)
KOH PREP NAIL: NORMAL
LYMPHOCYTES # BLD AUTO: 1.5 10*3/MM3 (ref 0.7–3.1)
LYMPHOCYTES NFR BLD AUTO: 23.1 % (ref 19.6–45.3)
MCH RBC QN AUTO: 31.3 PG (ref 26.6–33)
MCHC RBC AUTO-ENTMCNC: 32.7 G/DL (ref 31.5–35.7)
MCV RBC AUTO: 95.7 FL (ref 79–97)
MONOCYTES # BLD AUTO: 0.53 10*3/MM3 (ref 0.1–0.9)
MONOCYTES NFR BLD AUTO: 8.2 % (ref 5–12)
NEUTROPHILS NFR BLD AUTO: 4.32 10*3/MM3 (ref 1.7–7)
NEUTROPHILS NFR BLD AUTO: 66.4 % (ref 42.7–76)
PLATELET # BLD AUTO: 202 10*3/MM3 (ref 140–450)
PMV BLD AUTO: 9 FL (ref 6–12)
POTASSIUM SERPL-SCNC: 4.6 MMOL/L (ref 3.4–5)
PROT SERPL-MCNC: 7 G/DL (ref 6.3–8.6)
RBC # BLD AUTO: 3.74 10*6/MM3 (ref 3.77–5.28)
RHEUMATOID FACT SERPL-ACNC: NEGATIVE [IU]/ML
SODIUM SERPL-SCNC: 137 MMOL/L (ref 137–145)
WBC NRBC COR # BLD: 6.5 10*3/MM3 (ref 3.4–10.8)

## 2023-05-03 PROCEDURE — 80053 COMPREHEN METABOLIC PANEL: CPT | Performed by: NURSE PRACTITIONER

## 2023-05-03 PROCEDURE — 85025 COMPLETE CBC W/AUTO DIFF WBC: CPT | Performed by: NURSE PRACTITIONER

## 2023-05-03 PROCEDURE — 87220 TISSUE EXAM FOR FUNGI: CPT | Performed by: NURSE PRACTITIONER

## 2023-05-03 PROCEDURE — 86430 RHEUMATOID FACTOR TEST QUAL: CPT | Performed by: NURSE PRACTITIONER

## 2023-05-03 PROCEDURE — 82550 ASSAY OF CK (CPK): CPT | Performed by: NURSE PRACTITIONER

## 2023-05-03 PROCEDURE — 86140 C-REACTIVE PROTEIN: CPT | Performed by: NURSE PRACTITIONER

## 2023-05-03 PROCEDURE — 85651 RBC SED RATE NONAUTOMATED: CPT | Performed by: NURSE PRACTITIONER

## 2023-05-03 PROCEDURE — 83036 HEMOGLOBIN GLYCOSYLATED A1C: CPT | Performed by: NURSE PRACTITIONER

## 2023-05-03 PROCEDURE — 82306 VITAMIN D 25 HYDROXY: CPT | Performed by: NURSE PRACTITIONER

## 2023-05-03 PROCEDURE — 86038 ANTINUCLEAR ANTIBODIES: CPT | Performed by: NURSE PRACTITIONER

## 2023-05-03 PROCEDURE — 36415 COLL VENOUS BLD VENIPUNCTURE: CPT | Performed by: NURSE PRACTITIONER

## 2023-05-03 PROCEDURE — 82607 VITAMIN B-12: CPT | Performed by: NURSE PRACTITIONER

## 2023-05-03 PROCEDURE — 86200 CCP ANTIBODY: CPT | Performed by: NURSE PRACTITIONER

## 2023-05-03 PROCEDURE — 87102 FUNGUS ISOLATION CULTURE: CPT | Performed by: NURSE PRACTITIONER

## 2023-05-03 RX ORDER — MELOXICAM 7.5 MG/1
7.5 TABLET ORAL DAILY
Qty: 30 TABLET | Refills: 2 | Status: SHIPPED | OUTPATIENT
Start: 2023-05-03

## 2023-05-03 RX ORDER — LISINOPRIL 40 MG/1
TABLET ORAL
COMMUNITY
Start: 2023-03-28 | End: 2023-05-03 | Stop reason: SINTOL

## 2023-05-03 NOTE — PROGRESS NOTES
"Chief Complaint  Diabetes (3 month follow up) and Nail Problem (Finger nail issue/)    Subjective        Odessa Walker presents to [facility] [place]  History of Present Illness  Patient here today with her sister for recheck of hypertension hyperlipidemia diabetes.  She is complaining that her nails are breaking away on most of her fingers and are red as well.  She feels like this is due to her vitamins that she was taking so she stopped them on her own for the last month.  Symptoms continuing.  She reports burning sensation in her fingertips at times.  Reports fingerstick blood sugar running 70s to 80s on average at home.  Is hypertensive, controlled.  Is diabetic, reports controlled.  Is due for labs.  No complaints of chest pain headache shortness of breath.  No complaints of side effects of medication.  Was recently referred to new cardiologist since hers has moved out of town.  She denies needing refills on medicines today.    Objective   Vital Signs:  /78   Pulse 85   Ht 165.1 cm (65\")   Wt 56.7 kg (125 lb)   SpO2 97%   BMI 20.80 kg/m²   Estimated body mass index is 20.8 kg/m² as calculated from the following:    Height as of this encounter: 165.1 cm (65\").    Weight as of this encounter: 56.7 kg (125 lb).     BMI is within normal parameters. No other follow-up for BMI required.    Physical Exam  Vitals and nursing note reviewed.   Constitutional:       General: She is not in acute distress.     Appearance: Normal appearance. She is normal weight. She is not ill-appearing, toxic-appearing or diaphoretic.   HENT:      Head: Normocephalic and atraumatic.   Neck:      Vascular: No carotid bruit.   Cardiovascular:      Rate and Rhythm: Normal rate and regular rhythm.      Heart sounds: Normal heart sounds. No murmur heard.    No friction rub. No gallop.   Pulmonary:      Effort: Pulmonary effort is normal. No respiratory distress.      Breath sounds: " Normal breath sounds. No stridor. No wheezing, rhonchi or rales.   Musculoskeletal:      Right hand: Swelling, deformity and tenderness present. Decreased range of motion. Normal pulse.      Left hand: Swelling, deformity and tenderness present. Decreased range of motion. Normal pulse.      Cervical back: Neck supple.      Right lower leg: No edema.      Left lower leg: No edema.   Skin:     General: Skin is warm and dry.      Coloration: Skin is not jaundiced or pale.      Findings: No bruising, erythema, lesion or rash.      Comments: All fingernails except thumb nails and nails on fifth digits with cracking, some pulling away from the nail bed, thick, not yellowed, fingertips erythematous, swollen slightly and mildly tender to palp with decreased ROM   Neurological:      Mental Status: She is alert and oriented to person, place, and time.      Motor: Weakness present.   Psychiatric:         Mood and Affect: Mood normal.         Behavior: Behavior normal.         Thought Content: Thought content normal.         Judgment: Judgment normal.        Result Review :    CMP        1/6/2023    11:28 1/19/2023    10:33 5/3/2023    13:46   CMP   Glucose  89   190     Glucose 248          BUN 40      22   26     Creatinine 1.4      1.29   1.24     EGFR  45.3   47.2     Sodium 135      138   137     Potassium 5.6      4.7   4.6     Chloride 99      102   100     Calcium 9.6      9.1   9.8     Total Protein  7.5   7.0     Albumin 3.7      4.2   4.0     Globulin  3.3   3.0     Total Bilirubin  0.7   0.5     Alkaline Phosphatase  44   39     AST (SGOT)  27   26     ALT (SGPT)  23   22     Albumin/Globulin Ratio  1.3   1.3     BUN/Creatinine Ratio  17.1   21.0     Anion Gap  7.0   7.0         This result is from an external source.     CBC        10/28/2022    10:30 1/19/2023    10:33 5/3/2023    13:46   CBC   WBC 6.36   5.08   6.50     RBC 3.84   3.90   3.74     Hemoglobin 11.4   11.9   11.7     Hematocrit 35.7   35.2   35.8      MCV 93.0   90.3   95.7     MCH 29.7   30.5   31.3     MCHC 31.9   33.8   32.7     RDW 12.7   12.4   13.2     Platelets 240   214   202       CBC w/diff        10/28/2022    10:30 1/19/2023    10:33 5/3/2023    13:46   CBC w/Diff   WBC 6.36   5.08   6.50     RBC 3.84   3.90   3.74     Hemoglobin 11.4   11.9   11.7     Hematocrit 35.7   35.2   35.8     MCV 93.0   90.3   95.7     MCH 29.7   30.5   31.3     MCHC 31.9   33.8   32.7     RDW 12.7   12.4   13.2     Platelets 240   214   202     Neutrophil Rel % 58.0   60.8   66.4     Lymphocyte Rel % 31.4   27.0   23.1     Monocyte Rel % 8.6   10.2   8.2     Eosinophil Rel % 1.7   1.4   2.0     Basophil Rel % 0.3   0.6   0.3       Lipid Panel        8/5/2022    10:53 1/19/2023    10:33   Lipid Panel   Total Cholesterol 178   192     Triglycerides 90   88     HDL Cholesterol 59   63     VLDL Cholesterol 16   16     LDL Cholesterol  103   113     LDL/HDL Ratio 1.71   1.77       TSH        1/19/2023    10:33   TSH   TSH 2.310           UA        10/28/2022    10:30 1/19/2023    10:33   Urinalysis   Squamous Epithelial Cells, UA 3-6   3-6     Specific Gravity, UA 1.015   1.020     Ketones, UA Negative   Negative     Blood, UA Trace   Trace     Leukocytes, UA Small (1+)   Moderate (2+)     Nitrite, UA Negative   Positive     RBC, UA 3-5   3-5     WBC, UA 6-12   13-20     Bacteria, UA Trace   3+                Assessment and Plan   Diagnoses and all orders for this visit:    1. Type 2 diabetes mellitus without complication, without long-term current use of insulin (Primary)  -     Vitamin D 25 hydroxy; Future  -     Hemoglobin A1c; Future  -     CBC & Differential; Future  -     Comprehensive metabolic panel; Future  -     JAIME Direct Reflex to 11 Biomarker; Future  -     Cyclic citrul peptide antibody, IgG/IgA; Future  -     CK; Future  -     C-reactive protein; Future  -     Sedimentation rate; Future  -     Rheumatoid Factor; Future  -     Vitamin B12; Future    2. Need for  pneumococcal vaccine  -     Pneumococcal Conjugate Vaccine 20-Valent (PCV20)    3. Mixed hyperlipidemia  -     Vitamin D 25 hydroxy; Future  -     Hemoglobin A1c; Future  -     CBC & Differential; Future  -     Comprehensive metabolic panel; Future  -     JAIME Direct Reflex to 11 Biomarker; Future  -     Cyclic citrul peptide antibody, IgG/IgA; Future  -     CK; Future  -     C-reactive protein; Future  -     Sedimentation rate; Future  -     Rheumatoid Factor; Future  -     Vitamin B12; Future    4. Essential hypertension  -     Vitamin D 25 hydroxy; Future  -     Hemoglobin A1c; Future  -     CBC & Differential; Future  -     Comprehensive metabolic panel; Future  -     JAIME Direct Reflex to 11 Biomarker; Future  -     Cyclic citrul peptide antibody, IgG/IgA; Future  -     CK; Future  -     C-reactive protein; Future  -     Sedimentation rate; Future  -     Rheumatoid Factor; Future  -     Vitamin B12; Future    5. Nail problem  -     Fungus Culture - , Hand, Digit Right; Future  -     Fungus Culture - , Hand, Digit Right    6. Vitamin D deficiency, unspecified  -     Vitamin D 25 hydroxy; Future    7. Fungus infection  -     Fungus Culture - , Hand, Digit Right; Future  -     Fungus Culture - , Hand, Digit Right    8. Immunization due  -     Zoster Vac Recomb Adjuvanted 50 MCG/0.5ML reconstituted suspension; Inject 0.5 mL into the appropriate muscle as directed by prescriber 1 (One) Time for 1 dose.  Dispense: 1 each; Refill: 1    9. Osteoarthritis of both hands, unspecified osteoarthritis type  -     meloxicam (Mobic) 7.5 MG tablet; Take 1 tablet by mouth Daily.  Dispense: 30 tablet; Refill: 2    Labs from 1/19/2023 reviewed today.  Labs are also ordered as above and she will be informed of results via phone, no clippings also obtained to rule out fungal infection, will treat accordingly.  She will continue on all medication as previously prescribed at present.  States that lisinopril has already been stopped by  her nephrologist so this is removed from her med list.  This is confirmed by the way after review of nephrology note from January 2023.  She is prescribed meloxicam to take daily as needed for DJD pain and is asked to return here sooner than 3 months if her nail problem, finger problem continues or worsens.  Otherwise I will see her back in 3 months for her recheck of chronic conditions.  We will obtain vitamins D and B levels with her next set of labs to see if she needs to continue off of these or restart.  Is awaiting appointment with cardiology.  They are made aware that his office will call them with appointment in near future and if not to call me back and let me know, Dr. Grace's who they requested to be referred to and referral sent a few days ago.  She is prescribed Shingrix as above and will obtain at her local pharmacy.  Pneumococcal vaccine given today and tolerated well.  All questions and concerns addressed with understanding verbalized.  They are aware and are in agreement to this plan.     I spent 30 minutes caring for Odessa on this date of service. This time includes time spent by me in the following activities:preparing for the visit, reviewing tests, performing a medically appropriate examination and/or evaluation , counseling and educating the patient/family/caregiver, ordering medications, tests, or procedures and documenting information in the medical record  Follow Up   Return in about 3 months (around 8/3/2023), or if symptoms worsen or fail to improve, for Recheck, or sooner as needed.  Patient was given instructions and counseling regarding her condition or for health maintenance advice. Please see specific information pulled into the AVS if appropriate.

## 2023-05-04 DIAGNOSIS — I10 ESSENTIAL HYPERTENSION: ICD-10-CM

## 2023-05-04 DIAGNOSIS — E11.9 TYPE 2 DIABETES MELLITUS WITHOUT COMPLICATION, WITHOUT LONG-TERM CURRENT USE OF INSULIN: ICD-10-CM

## 2023-05-04 DIAGNOSIS — E78.2 MIXED HYPERLIPIDEMIA: Primary | ICD-10-CM

## 2023-05-04 LAB
25(OH)D3 SERPL-MCNC: 34.3 NG/ML (ref 30–100)
CK SERPL-CCNC: 110 U/L (ref 20–180)
CRP SERPL-MCNC: <0.3 MG/DL (ref 0–0.5)
HBA1C MFR BLD: 7.2 % (ref 4.8–5.6)
VIT B12 BLD-MCNC: 623 PG/ML (ref 211–946)

## 2023-05-05 LAB
ANA SER QL: NEGATIVE
CCP IGA+IGG SERPL IA-ACNC: 4 UNITS (ref 0–19)

## 2023-05-10 LAB — FUNGUS WND CULT: NORMAL

## 2023-05-17 LAB — FUNGUS WND CULT: NORMAL

## 2023-05-24 DIAGNOSIS — E11.65 TYPE 2 DIABETES MELLITUS WITH HYPERGLYCEMIA, WITHOUT LONG-TERM CURRENT USE OF INSULIN: Chronic | ICD-10-CM

## 2023-05-24 LAB — FUNGUS WND CULT: NORMAL

## 2023-05-24 RX ORDER — INSULIN DEGLUDEC INJECTION 100 U/ML
INJECTION, SOLUTION SUBCUTANEOUS
Qty: 15 ML | Refills: 5 | Status: SHIPPED | OUTPATIENT
Start: 2023-05-24

## 2023-05-31 LAB — FUNGUS WND CULT: NORMAL

## 2023-08-04 ENCOUNTER — LAB (OUTPATIENT)
Dept: LAB | Facility: OTHER | Age: 68
End: 2023-08-04
Payer: MEDICARE

## 2023-08-04 DIAGNOSIS — E78.2 MIXED HYPERLIPIDEMIA: ICD-10-CM

## 2023-08-04 DIAGNOSIS — E11.9 TYPE 2 DIABETES MELLITUS WITHOUT COMPLICATION, WITHOUT LONG-TERM CURRENT USE OF INSULIN: ICD-10-CM

## 2023-08-04 DIAGNOSIS — I10 ESSENTIAL HYPERTENSION: ICD-10-CM

## 2023-08-04 LAB
ALBUMIN SERPL-MCNC: 4.1 G/DL (ref 3.5–5)
ALBUMIN/GLOB SERPL: 1.3 G/DL (ref 1.1–1.8)
ALP SERPL-CCNC: 49 U/L (ref 38–126)
ALT SERPL W P-5'-P-CCNC: 26 U/L
ANION GAP SERPL CALCULATED.3IONS-SCNC: 6 MMOL/L (ref 5–15)
AST SERPL-CCNC: 34 U/L (ref 14–36)
BASOPHILS # BLD AUTO: 0.08 10*3/MM3 (ref 0–0.2)
BASOPHILS NFR BLD AUTO: 1.4 % (ref 0–1.5)
BILIRUB SERPL-MCNC: 0.8 MG/DL (ref 0–1.2)
BUN SERPL-MCNC: 28 MG/DL (ref 7–23)
BUN/CREAT SERPL: 22.4 (ref 7–25)
CALCIUM SPEC-SCNC: 9.6 MG/DL (ref 8.4–10.2)
CHLORIDE SERPL-SCNC: 100 MMOL/L (ref 101–112)
CHOLEST SERPL-MCNC: 186 MG/DL (ref 150–200)
CO2 SERPL-SCNC: 32 MMOL/L (ref 22–30)
CREAT SERPL-MCNC: 1.25 MG/DL (ref 0.52–1.04)
DEPRECATED RDW RBC AUTO: 44.2 FL (ref 37–54)
EGFRCR SERPLBLD CKD-EPI 2021: 46.8 ML/MIN/1.73
EOSINOPHIL # BLD AUTO: 0.06 10*3/MM3 (ref 0–0.4)
EOSINOPHIL NFR BLD AUTO: 1 % (ref 0.3–6.2)
ERYTHROCYTE [DISTWIDTH] IN BLOOD BY AUTOMATED COUNT: 12.9 % (ref 12.3–15.4)
GLOBULIN UR ELPH-MCNC: 3.2 GM/DL (ref 2.3–3.5)
GLUCOSE SERPL-MCNC: 77 MG/DL (ref 70–99)
HCT VFR BLD AUTO: 36.3 % (ref 34–46.6)
HDLC SERPL-MCNC: 56 MG/DL (ref 40–59)
HGB BLD-MCNC: 12.1 G/DL (ref 12–15.9)
LDLC SERPL CALC-MCNC: 115 MG/DL
LDLC/HDLC SERPL: 2.04 {RATIO} (ref 0–3.22)
LYMPHOCYTES # BLD AUTO: 1.63 10*3/MM3 (ref 0.7–3.1)
LYMPHOCYTES NFR BLD AUTO: 28.2 % (ref 19.6–45.3)
MCH RBC QN AUTO: 32.3 PG (ref 26.6–33)
MCHC RBC AUTO-ENTMCNC: 33.3 G/DL (ref 31.5–35.7)
MCV RBC AUTO: 96.8 FL (ref 79–97)
MONOCYTES # BLD AUTO: 0.54 10*3/MM3 (ref 0.1–0.9)
MONOCYTES NFR BLD AUTO: 9.3 % (ref 5–12)
NEUTROPHILS NFR BLD AUTO: 3.48 10*3/MM3 (ref 1.7–7)
NEUTROPHILS NFR BLD AUTO: 60.1 % (ref 42.7–76)
PLATELET # BLD AUTO: 210 10*3/MM3 (ref 140–450)
PMV BLD AUTO: 8.8 FL (ref 6–12)
POTASSIUM SERPL-SCNC: 4.4 MMOL/L (ref 3.4–5)
PROT SERPL-MCNC: 7.3 G/DL (ref 6.3–8.6)
RBC # BLD AUTO: 3.75 10*6/MM3 (ref 3.77–5.28)
SODIUM SERPL-SCNC: 138 MMOL/L (ref 137–145)
TRIGL SERPL-MCNC: 80 MG/DL
VLDLC SERPL-MCNC: 15 MG/DL (ref 5–40)
WBC NRBC COR # BLD: 5.79 10*3/MM3 (ref 3.4–10.8)

## 2023-08-04 PROCEDURE — 83036 HEMOGLOBIN GLYCOSYLATED A1C: CPT | Performed by: NURSE PRACTITIONER

## 2023-08-04 PROCEDURE — 85025 COMPLETE CBC W/AUTO DIFF WBC: CPT | Performed by: NURSE PRACTITIONER

## 2023-08-04 PROCEDURE — 36415 COLL VENOUS BLD VENIPUNCTURE: CPT | Performed by: NURSE PRACTITIONER

## 2023-08-04 PROCEDURE — 80053 COMPREHEN METABOLIC PANEL: CPT | Performed by: NURSE PRACTITIONER

## 2023-08-04 PROCEDURE — 80061 LIPID PANEL: CPT | Performed by: NURSE PRACTITIONER

## 2023-08-05 LAB — HBA1C MFR BLD: 7.1 % (ref 4.8–5.6)

## 2023-08-08 ENCOUNTER — OFFICE VISIT (OUTPATIENT)
Dept: FAMILY MEDICINE CLINIC | Facility: CLINIC | Age: 68
End: 2023-08-08
Payer: MEDICARE

## 2023-08-08 VITALS
HEIGHT: 65 IN | OXYGEN SATURATION: 98 % | DIASTOLIC BLOOD PRESSURE: 72 MMHG | SYSTOLIC BLOOD PRESSURE: 112 MMHG | BODY MASS INDEX: 21.23 KG/M2 | HEART RATE: 88 BPM | WEIGHT: 127.4 LBS

## 2023-08-08 DIAGNOSIS — Z12.11 COLON CANCER SCREENING: ICD-10-CM

## 2023-08-08 DIAGNOSIS — N18.30 STAGE 3 CHRONIC KIDNEY DISEASE, UNSPECIFIED WHETHER STAGE 3A OR 3B CKD: Chronic | ICD-10-CM

## 2023-08-08 DIAGNOSIS — M81.0 OSTEOPOROSIS, UNSPECIFIED OSTEOPOROSIS TYPE, UNSPECIFIED PATHOLOGICAL FRACTURE PRESENCE: Primary | ICD-10-CM

## 2023-08-08 DIAGNOSIS — E11.65 TYPE 2 DIABETES MELLITUS WITH HYPERGLYCEMIA, WITHOUT LONG-TERM CURRENT USE OF INSULIN: Primary | Chronic | ICD-10-CM

## 2023-08-08 DIAGNOSIS — I10 ESSENTIAL HYPERTENSION: Chronic | ICD-10-CM

## 2023-08-08 DIAGNOSIS — M81.0 AGE RELATED OSTEOPOROSIS, UNSPECIFIED PATHOLOGICAL FRACTURE PRESENCE: Chronic | ICD-10-CM

## 2023-08-08 DIAGNOSIS — I20.8 CHRONIC STABLE ANGINA: ICD-10-CM

## 2023-08-08 DIAGNOSIS — H61.21 RIGHT EAR IMPACTED CERUMEN: ICD-10-CM

## 2023-08-08 DIAGNOSIS — E78.2 MIXED HYPERLIPIDEMIA: Chronic | ICD-10-CM

## 2023-08-08 RX ORDER — PEN NEEDLE, DIABETIC 31 GX5/16"
NEEDLE, DISPOSABLE MISCELLANEOUS
Qty: 100 EACH | Refills: 4 | Status: SHIPPED | OUTPATIENT
Start: 2023-08-08

## 2023-08-08 RX ORDER — CALCIUM CITRATE/VITAMIN D3 200MG-6.25
1 TABLET ORAL DAILY
Qty: 100 EACH | Refills: 5 | Status: SHIPPED | OUTPATIENT
Start: 2023-08-08

## 2023-08-08 RX ORDER — INSULIN DEGLUDEC INJECTION 100 U/ML
10 INJECTION, SOLUTION SUBCUTANEOUS NIGHTLY
Qty: 15 ML | Refills: 5 | Status: SHIPPED | OUTPATIENT
Start: 2023-08-08

## 2023-08-08 NOTE — PROGRESS NOTES
"Chief Complaint  Diabetes (3 month follow up) and Ear Fullness (Right ear)    Subjective        Odessa Walker presents to [facility]  Diabetes    Ear Fullness   Patient here today for recheck of CKD, diabetes, hypertension hyperlipidemia and osteoarthritis.  Complaining of decreased hearing right ear and thinks she may have earwax occluding her ear canal.  She is still seeing nephrology in [place] and is following them about every 6 months, due to follow back up with him in November 2023.  Patient reports that nephrologist stopped lisinopril which is also no longer on her med list.  Blood pressure stable today.  She denies chest pain headache shortness of breath.  Has had reports intermittently of low blood sugars at times.  She states on average her sugars are running less than 100 usually but at times will drop into the 60s where she feels weak and shaking.  She has decreased Tresiba on her own from 15 units nightly to 13 units nightly.  She is due to see Dr. Griffiths also for her diabetic eye exam and states that she will schedule.  She had labs recently and is here for those results.  States that she has not missed her last Prolia injection, needing this to be refilled so that she can obtain it, it appears it was due in May 2023.  She is due for colonoscopy/Cologuard.  States no family history of colon cancer and is unable to travel very far for colonoscopy but is open to obtaining Cologuard.    Objective   Vital Signs:  /72   Pulse 88   Ht 165.1 cm (65\")   Wt 57.8 kg (127 lb 6.4 oz)   SpO2 98%   BMI 21.20 kg/mý   Estimated body mass index is 21.2 kg/mý as calculated from the following:    Height as of this encounter: 165.1 cm (65\").    Weight as of this encounter: 57.8 kg (127 lb 6.4 oz).     BMI is within normal parameters. No other follow-up for BMI required.    Physical Exam  Vitals and nursing note reviewed.   Constitutional:       " General: She is not in acute distress.     Appearance: Normal appearance. She is not ill-appearing, toxic-appearing or diaphoretic.   HENT:      Head: Normocephalic and atraumatic.   Neck:      Vascular: No carotid bruit.   Cardiovascular:      Rate and Rhythm: Normal rate and regular rhythm.      Heart sounds: Normal heart sounds. No murmur heard.    No friction rub. No gallop.   Pulmonary:      Effort: Pulmonary effort is normal. No respiratory distress.      Breath sounds: Normal breath sounds. No stridor. No wheezing, rhonchi or rales.   Musculoskeletal:      Right lower leg: No edema.      Left lower leg: No edema.   Skin:     General: Skin is warm and dry.      Coloration: Skin is not jaundiced or pale.      Findings: No bruising, erythema, lesion or rash.   Neurological:      Mental Status: She is alert and oriented to person, place, and time.      Cranial Nerves: No cranial nerve deficit.      Motor: No weakness.      Gait: Gait abnormal (slowed gait , with assist, guarded but steady).   Psychiatric:         Mood and Affect: Mood normal.         Behavior: Behavior normal.         Thought Content: Thought content normal.         Judgment: Judgment normal.      Result Review :  The following data was reviewed by: SIDDHARTH Jim on 08/08/2023:  CMP          1/19/2023    10:33 5/3/2023    13:46 8/4/2023    11:50   CMP   Glucose 89  190  77    BUN 22  26  28    Creatinine 1.29  1.24  1.25    EGFR 45.3  47.2  46.8    Sodium 138  137  138    Potassium 4.7  4.6  4.4    Chloride 102  100  100    Calcium 9.1  9.8  9.6    Total Protein 7.5  7.0  7.3    Albumin 4.2  4.0  4.1    Globulin 3.3  3.0  3.2    Total Bilirubin 0.7  0.5  0.8    Alkaline Phosphatase 44  39  49    AST (SGOT) 27  26  34    ALT (SGPT) 23  22  26    Albumin/Globulin Ratio 1.3  1.3  1.3    BUN/Creatinine Ratio 17.1  21.0  22.4    Anion Gap 7.0  7.0  6.0      CBC          1/19/2023    10:33 5/3/2023    13:46 8/4/2023    11:50   CBC   WBC 5.08   6.50  5.79    RBC 3.90  3.74  3.75    Hemoglobin 11.9  11.7  12.1    Hematocrit 35.2  35.8  36.3    MCV 90.3  95.7  96.8    MCH 30.5  31.3  32.3    MCHC 33.8  32.7  33.3    RDW 12.4  13.2  12.9    Platelets 214  202  210      CBC w/diff          1/19/2023    10:33 5/3/2023    13:46 8/4/2023    11:50   CBC w/Diff   WBC 5.08  6.50  5.79    RBC 3.90  3.74  3.75    Hemoglobin 11.9  11.7  12.1    Hematocrit 35.2  35.8  36.3    MCV 90.3  95.7  96.8    MCH 30.5  31.3  32.3    MCHC 33.8  32.7  33.3    RDW 12.4  13.2  12.9    Platelets 214  202  210    Neutrophil Rel % 60.8  66.4  60.1    Lymphocyte Rel % 27.0  23.1  28.2    Monocyte Rel % 10.2  8.2  9.3    Eosinophil Rel % 1.4  2.0  1.0    Basophil Rel % 0.6  0.3  1.4      Lipid Panel          1/19/2023    10:33 8/4/2023    11:50   Lipid Panel   Total Cholesterol 192  186    Triglycerides 88  80    HDL Cholesterol 63  56    VLDL Cholesterol 16  15    LDL Cholesterol  113  115    LDL/HDL Ratio 1.77  2.04      TSH          1/19/2023    10:33   TSH   TSH 2.310        Data reviewed : Radiologic studies mammo results from march 2023 reviewed today          Assessment and Plan   Diagnoses and all orders for this visit:    1. Type 2 diabetes mellitus with hyperglycemia, without long-term current use of insulin (Primary)  -     Insulin Pen Needle (Easy Touch Pen Needles) 31G X 8 MM misc; Use as intructed  Dispense: 100 each; Refill: 4  -     glucose blood (True Metrix Blood Glucose Test) test strip; 1 each by Other route Daily. use to test blood sugar once daily  Dispense: 100 each; Refill: 5  -     Tresiba FlexTouch 100 UNIT/ML solution pen-injector injection; Inject 10 Units under the skin into the appropriate area as directed Every Night.  Dispense: 15 mL; Refill: 5    2. Essential hypertension  Comments:  BP controlled off lisinopril which was stopped per nephrology    3. Mixed hyperlipidemia    4. Colon cancer screening  -     Cologuard - Stool, Per Rectum; Future    5. Age  related osteoporosis, unspecified pathological fracture presence  -     denosumab (PROLIA) 60 MG/ML solution prefilled syringe syringe; Inject 1 mL under the skin into the appropriate area as directed 1 (One) Time for 1 dose.  Dispense: 1 mL; Refill: 0    6. Right ear impacted cerumen    7. Chronic stable angina    8. Stage 3 chronic kidney disease, unspecified whether stage 3a or 3b CKD    We will continue to see nephrology as scheduled, Prolia refilled for her, Cologuard ordered for her and I will decrease Tresiba dose to 10 units nightly, keeping all of her other medications the same at present.  She is given refills on her lancets and test strips for glucometer.  She will follow-up in 6 months for recheck or sooner if needed.  Mammogram up-to-date.  Labs from August 4, 2023 are reviewed with her today, copies given to her.  Right ear canal is irrigated and patient tolerated well.  Large amount of cerumen was removed and patient reports hearing improved thereafter.  Should she have any continued hypoglycemic symptoms she will return for follow-up sooner than above.  Advised to eat frequent small meals throughout the day as well and a nightly snack.  All questions and concerns addressed with understanding verbalized.  They are aware and in agreement to this plan.     I spent 25 minutes caring for Odessa on this date of service. This time includes time spent by me in the following activities:preparing for the visit, reviewing tests, performing a medically appropriate examination and/or evaluation , counseling and educating the patient/family/caregiver, ordering medications, tests, or procedures, and documenting information in the medical record  Follow Up   Return in about 6 months (around 2/8/2024), or if symptoms worsen or fail to improve, for Recheck, or sooner as needed.  Patient was given instructions and counseling regarding her condition or for health maintenance advice. Please see specific information pulled  into the AVS if appropriate.

## 2023-08-29 DIAGNOSIS — E11.65 TYPE 2 DIABETES MELLITUS WITH HYPERGLYCEMIA, WITHOUT LONG-TERM CURRENT USE OF INSULIN: Chronic | ICD-10-CM

## 2023-08-29 RX ORDER — CALCIUM CITRATE/VITAMIN D3 200MG-6.25
1 TABLET ORAL DAILY
Qty: 100 EACH | Refills: 5 | Status: SHIPPED | OUTPATIENT
Start: 2023-08-29

## 2025-07-10 NOTE — PROGRESS NOTES
Od[place] Walker  66 y.o. female    05/11/2021  Chief Complaint   Patient presents with   • Palpitations       History of Present Illness  Patient with a nonhealing wound    -        SUBJECTIVE  Patient states she has 2 problems today.  One is that her hands are always cold.  She states they do not derian.  They do not get erythema to this.  She notices no change when she puts them in the refrigerator.  She says they just are always cold.  She also has some problems with the wound of her lower extremity.  Allergies   Allergen Reactions   • Penicillins Rash         Past Medical History:   Diagnosis Date   • Arthritis     BACK, KNEES AND SHOULDER   • Arthritis    • Back pain    • Benign essential hypertension    • Candidiasis of skin and nail     L foot   • Chest pain    • Chronic anemia    • Claudication (CMS/HCC)    • Corns and callosities    • Depressive disorder    • Dermatitis    • Disease of nail     other specified   • Disorder of peripheral nervous system    • Displaced fracture of third metatarsal bone, right foot, initial encounter for closed fracture    • Displaced fracture of third metatarsal bone, right foot, subsequent encounter for fracture with routine healing    • Diverticular disease of colon    • Dog bite of hand    • Epigastric pain    • Essential hypertension    • Foot pain    • GERD (gastroesophageal reflux disease)    • H/O screening mammography    • Hallux valgus    • Heartburn    • Hyperlipidemia    • Hypertension    • Joint pain    • Joint swelling    • 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 Hemorrhoids found in the anus.   • HARDWARE REMOVAL Right 7/19/2019    Procedure: excision of loose body in right knee.;  Surgeon: Pradeep Jacobs MD;  Location: [facility];  Service: Orthopedics   • KNEE SURGERY Right 2005   • OOPHORECTOMY     • OTHER SURGICAL HISTORY  05/06/2015    DEBRIDE NAIL 6 OR MORE    • OTHER SURGICAL HISTORY  05/06/2015    PARING CORN/CALLUS    • OTHER SURGICAL HISTORY  10/25/2015    TREAT METATARSAL FRACTURE    • UPPER GASTROINTESTINAL ENDOSCOPY  11/11/2015    Esophagitis seen.Biopsy taken.A hiatus hernia was found in the esophagus.Gastritis found in the stomach.Biopsy taken   • US VENOUS EXTREMITY UNILATERAL  01/26/2016   • VAGINAL HYSTERECTOMY SALPINGO OOPHORECTOMY  1999         Family History   Problem Relation Age of Onset   • Heart disease Mother    • Osteoarthritis Father    • Diabetes Other    • Hypertension Other    • Kidney disease Other    • Ovarian cancer Sister          Social History     Socioeconomic History   • Marital status:      Spouse name: Not on file   • Number of children: Not on file   • Years of education: Not on file   • Highest education level: Not on file   Tobacco Use   • Smoking status: Former Smoker     Packs/day: 0.50     Years: 8.00     Pack years: 4.00     Types: Cigarettes   • Smokeless tobacco: Never Used   • Tobacco comment: quit smoking >20 yr ago   Vaping Use   • Vaping Use: Never used   Substance and Sexual Activity   • Alcohol use: No   • Drug use: No   • Sexual activity: Defer         Prior to Admission medications    Medication Sig Start Date End Date Taking? Authorizing Provider   aspirin 81 MG chewable tablet Chew 81 mg Daily.   Yes Provider, MD Dee   Calcium Carbonate (CALCIUM 600 PO) Take 1 tablet by mouth Daily.    Yes Dee Singleton MD   Empagliflozin (Jardiance) 25 MG tablet Take 25 mg by mouth Daily. 2/10/21  Yes Kendal Mccoy APRN   Flax Oil-Fish Oil-Borage Oil (FISH OIL-FLAX OIL-BORAGE OIL PO) Take 1 tablet by mouth 2 (Two) Times a Day.   Yes Dee Singleton MD   fluticasone (FLONASE) 50 MCG/ACT nasal spray 2 sprays by Each Nare route Daily. Ms. Felton is covering for Ms. Mccoy. 11/25/20  Yes Rubi Felton APRN   glipizide (GLUCOTROL) 10 MG tablet TAKE 1 TABLET BY MOUTH TWO TIMES A DAY BEFORE MEALS 4/22/21  Yes Kendal Mccoy APRN   Glucosamine HCl (GLUCOSAMINE PO) Take 1,000 mg by mouth.   Yes Dee Singleton MD   glucose blood (Accu-Chek Mary Kate Plus) test strip 1 each by Other route Daily. use to test blood sugar once daily 10/20/20  Yes Knedal Mccoy APRN   glucose monitor monitoring kit 1 each Daily. DX:E11.9 1/24/19  Yes Kendal Mccoy APRN   glucose monitor monitoring kit 1 each Daily. DX E11.9 10/28/20  Yes Kendal Mccoy APRN   hydrOXYzine (ATARAX) 25 MG tablet Take 1-2 po qhs prn sleep 3/12/20  Yes Kendal Mccoy APRN   Insulin Pen Needle (PEN NEEDLES) 31G X 8 MM misc Use as instructed with victoza 6/11/20  Yes Kendal Mccoy APRN   IRON PO Take 65 mg by mouth Daily.   Yes Dee Singleton MD   Lancets misc Once daily 1/24/19  Yes Kendal Mccoy APRN   Liraglutide (Victoza) 18 MG/3ML solution pen-injector injection Inject 1.8 mg under the skin into the appropriate area as directed Daily. 5/4/21  Yes Kendal Mccoy APRN   lisinopril (PRINIVIL,ZESTRIL) 40 MG tablet TAKE 1/2 TABLET BY MOUTH DAILY 3/2/21  Yes Kendal Mccoy APRN   Multiple Vitamins-Minerals (ONE-A-DAY 50 PLUS PO) Take 1 tablet by mouth Daily.   Yes Dee Singleton MD   ONE TOUCH LANCETS misc 1 applicator Daily. 4/11/18  Yes Kendal Mccoy APRN   ranolazine (Ranexa) 500 MG 12 hr tablet Take 1 tablet by mouth 2 (Two) Times a Day. 10/27/20  Yes Leslie Kapadia MD   rosuvastatin (CRESTOR) 10 MG tablet  "Take 1 tablet by mouth Daily. 10/27/20  Yes Leslie Kapadia MD   sertraline (ZOLOFT) 50 MG tablet TAKE 1 TABLET BY MOUTH DAILY 3/16/21  Yes Kendal Mccoy APRN   sulindac (CLINORIL) 200 MG tablet Take 200 mg by mouth 2 (Two) Times a Day.   Yes Provider, MD Dee         OBJECTIVE    /78   Pulse 79   Temp 98 °F (36.7 °C)   Ht 165.1 cm (65\")   Wt 53 kg (116 lb 14.4 oz)   SpO2 99%   BMI 19.45 kg/m²         Review of Systems     Constitutional:  Denies recent weight loss, weight gain, fever or chills, no change in exercise tolerance     HENT:  Denies any hearing loss, epistaxis, hoarseness, or difficulty speaking.     Eyes: Wears eyeglasses or contact lenses     Respiratory:  Denies dyspnea with exertion,no cough, wheezing, or hemoptysis.     Cardiovascular: Negative for palpations, chest pain, orthopnea, PND, peripheral edema, syncope, or claudication.     Gastrointestinal:  Denies change in bowel habits, dyspepsia, ulcer disease, hematochezia, or melena.     Endocrine: Negative for cold intolerance, heat intolerance, polydipsia, polyphagia and polyuria. Denies any history of weight change, heat/cold intolerance, polydipsia, polyuria     Genitourinary: Negative.      Musculoskeletal: Denies any history of arthritic symptoms or back problems     Skin:  Denies any change in hair or nails, rashes, or skin lesions.     Allergic/Immunologic: Negative.  Negative for environmental allergies, food allergies and immunocompromised state.     Neurological:  Denies any history of recurrent headaches, strokes, TIA, or seizure disorder.     Hematological: Denies any food allergies, seasonal allergies, bleeding disorders, or lymphadenopathy.     Psychiatric/Behavioral: Denies any history of depression, substance abuse, or change in cognitive function.         Physical Exam     Constitutional: Cooperative, alert and oriented, well-developed, well-nourished, in no acute distress.     HENT:   Head: Normocephalic, " normal hair patterns, no masses or tenderness.  Ears, Nose, and Throat: No gross abnormalities. No pallor or cyanosis. Dentition good.   Eyes: EOMS intact, PERRL, conjunctivae and lids unremarkable. Fundoscopic exam and visual fields not performed.   Neck: No palpable masses or adenopathy, no thyromegaly, no JVD, carotid pulses are full and equal bilaterally and without  Bruits.     Cardiovascular: Regular rhythm, S1 and S2 normal, no S3 or S4. Apical impulse not displaced. No murmurs, gallops, or rubs detected.     Pulmonary/Chest: Chest: normal symmetry, no tenderness to palpation, normal respiratory excursion, no intercostal retraction, no use of accessory muscles.            Pulmonary: Normal breath sounds. No rales or ronchi.    Abdominal: Abdomen soft, bowel sounds normoactive, no masses, no hepatosplenomegaly, non-tender, no bruits.     Musculoskeletal: Decreased pulses bilateral radial and peripheral.  No deformities, clubbing, cyanosis, erythema, or edema observed. There are no spinal abnormalities noted. Normal muscle strength and tone. no bruits auscultated. Right foot wound    Neurological: No gross motor or sensory deficits noted, affect appropriate, oriented to time, person, place.     Skin: Warm and dry to the touch, right wound or masses noted.     Psychiatric: She has a normal mood and affect. Her behavior is normal. Judgment and thought content normal.         Procedures      Lab Results   Component Value Date    WBC 5.85 10/27/2020    HGB 11.5 (L) 10/27/2020    HCT 35.5 10/27/2020    MCV 95.7 10/27/2020     10/27/2020     Lab Results   Component Value Date    GLUCOSE 178 (H) 10/27/2020    BUN 30 (H) 10/27/2020    CREATININE 1.24 (H) 10/27/2020    EGFRIFNONA 43 (L) 10/27/2020    BCR 24.2 10/27/2020    CO2 27.0 10/27/2020    CALCIUM 10.3 (H) 10/27/2020    ALBUMIN 4.50 10/27/2020    AST 30 10/27/2020    ALT 21 10/27/2020     Lab Results   Component Value Date    CHOL 142 (L) 10/27/2020    CHOL  135 (L) 03/03/2020    CHOL 135 (L) 07/17/2019     Lab Results   Component Value Date    TRIG 65 10/27/2020    TRIG 79 03/03/2020    TRIG 88 07/17/2019     Lab Results   Component Value Date    HDL 71 (H) 10/27/2020    HDL 68 (H) 03/03/2020    HDL 51 07/17/2019     No components found for: LDLCALC  Lab Results   Component Value Date    LDL 58 10/27/2020    LDL 51 03/03/2020    LDL 66 07/17/2019     No results found for: HDLLDLRATIO  No components found for: CHOLHDL  Lab Results   Component Value Date    HGBA1C 7.12 (H) 10/27/2020     Lab Results   Component Value Date    TSH 3.150 03/03/2020    I9XRHAE 117 05/31/2016    Z4SWIAV 8.89 03/03/2020           ASSESSMENT AND PLAN      Diagnoses and all orders for this visit:    1. H/O open leg wound (Primary)    2. 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  I would like to do arterial Dopplers given her diabetes history and smoking history as I think she does probably have circulation issues.    Patient's Body mass index is 19.45 kg/m². indicating that she is within normal range (BMI 18.5-24.9). No BMI management plan needed..                Leslie Kapadia MD  5/11/2021  10:16 CDT   Statement Selected

## (undated) DEVICE — PK MAJ PROC LF 60

## (undated) DEVICE — SPNG LAP 18X18IN LF STRL PK/5

## (undated) DEVICE — BNDG ELAS CO-FLEX SLF ADHR 4IN5YD LF STRL

## (undated) DEVICE — DRAPE,U/ SHT,SPLIT,PLAS,STERIL: Brand: MEDLINE

## (undated) DEVICE — STERILE POLYISOPRENE POWDER-FREE SURGICAL GLOVES WITH EMOLLIENT COATING: Brand: PROTEXIS

## (undated) DEVICE — DISPOSABLE TOURNIQUET CUFF SINGLE BLADDER, DUAL PORT AND QUICK CONNECT CONNECTOR: Brand: COLOR CUFF

## (undated) DEVICE — BNDG ELAS ELITE V/CLOSE 6IN 5YD LF STRL

## (undated) DEVICE — GOWN,PREVENTION PLUS,XLONG/XLARGE,STRL: Brand: MEDLINE

## (undated) DEVICE — SYR LL TP 10ML STRL

## (undated) DEVICE — ANTIBACTERIAL UNDYED BRAIDED (POLYGLACTIN 910), SYNTHETIC ABSORBABLE SUTURE: Brand: COATED VICRYL

## (undated) DEVICE — UNDYED BRAIDED (POLYGLACTIN 910), SYNTHETIC ABSORBABLE SUTURE: Brand: COATED VICRYL

## (undated) DEVICE — GAUZE,SPONGE,FLUFF,6"X6.75",STRL,5/TRAY: Brand: MEDLINE

## (undated) DEVICE — UNDRPD BREATH 23X36 BG/10

## (undated) DEVICE — SUT ETHLN 3-0 FS118IN 663H

## (undated) DEVICE — NDL HYPO PRECISIONGLIDE/REG 18G 1IN PNK

## (undated) DEVICE — GLV SURG TRIUMPH LT PF LTX 8 STRL

## (undated) DEVICE — STCKNT IMPERV 12IN STRL

## (undated) DEVICE — SOL IRR NACL 0.9PCT BT 1000ML

## (undated) DEVICE — GLV SURG SENSICARE PI LF PF 7.5 GRN STRL

## (undated) DEVICE — GLV SURG TRIUMPH PF LTX 7 STRL